# Patient Record
Sex: MALE | Race: WHITE | Employment: OTHER | URBAN - METROPOLITAN AREA
[De-identification: names, ages, dates, MRNs, and addresses within clinical notes are randomized per-mention and may not be internally consistent; named-entity substitution may affect disease eponyms.]

---

## 2017-01-03 ENCOUNTER — TELEPHONE (OUTPATIENT)
Dept: CARDIOLOGY CLINIC | Age: 67
End: 2017-01-03

## 2017-01-03 NOTE — TELEPHONE ENCOUNTER
Patient called to see if you could put him on something other then Xarelto 20 mg once daily a 90 day supply cost him $400.00 and can not afford it any more,  The pharmacy suggested Plavix is this an option for him. Please call and advise him. Thank you.

## 2017-01-03 NOTE — TELEPHONE ENCOUNTER
Message        ----- Message from Celeste Boggs NP sent at 1/3/2017  1:12 PM EST -----       Have the patient check with the pharmacy is       1. Eliquis 5mg BID        2. Pradaxa 150mg BID       Is more affordable, if not then he needs to switch to Coumadin. Plavix is not a alternative.        ----- Message from Chele Arriaza LPN sent at 0/6/3047  1:06 PM EST -----       Please advise as to what he can switch to. Thank you. Called pt,verified with two pt identifiers, relayed message to pt. He asked that I call back and leave message on his voice mail letting him know the options. I called back and left the options on his phone and advised him to call back once he has made a decision.

## 2017-01-06 ENCOUNTER — TELEPHONE (OUTPATIENT)
Dept: CARDIOLOGY CLINIC | Age: 67
End: 2017-01-06

## 2017-01-17 ENCOUNTER — ANESTHESIA (OUTPATIENT)
Dept: ENDOSCOPY | Age: 67
End: 2017-01-17
Payer: MEDICARE

## 2017-01-17 ENCOUNTER — ANESTHESIA EVENT (OUTPATIENT)
Dept: ENDOSCOPY | Age: 67
End: 2017-01-17
Payer: MEDICARE

## 2017-01-17 PROCEDURE — 74011250636 HC RX REV CODE- 250/636

## 2017-01-17 PROCEDURE — 74011000250 HC RX REV CODE- 250

## 2017-01-17 RX ORDER — GLYCOPYRROLATE 0.2 MG/ML
INJECTION INTRAMUSCULAR; INTRAVENOUS AS NEEDED
Status: DISCONTINUED | OUTPATIENT
Start: 2017-01-17 | End: 2017-01-17 | Stop reason: HOSPADM

## 2017-01-17 RX ORDER — PROPOFOL 10 MG/ML
INJECTION, EMULSION INTRAVENOUS AS NEEDED
Status: DISCONTINUED | OUTPATIENT
Start: 2017-01-17 | End: 2017-01-17 | Stop reason: HOSPADM

## 2017-01-17 RX ADMIN — GLYCOPYRROLATE 0.2 MG: 0.2 INJECTION INTRAMUSCULAR; INTRAVENOUS at 09:21

## 2017-01-17 RX ADMIN — PROPOFOL 180 MG: 10 INJECTION, EMULSION INTRAVENOUS at 09:26

## 2017-01-17 NOTE — ANESTHESIA POSTPROCEDURE EVALUATION
Post-Anesthesia Evaluation and Assessment    Patient: Mechele Gitelman. MRN: [id number]  SSN: [id number]    YOB: 1950  Age: 79 y.o. Sex: male       Cardiovascular Function/Vital Signs  Visit Vitals    /85    Pulse (!) 58    Temp 36.4 °C (97.6 °F)    Resp 14    Ht 5' 8\" (1.727 m)    Wt 89.6 kg (197 lb 8 oz)    SpO2 94%    BMI 30.03 kg/m2       Patient is status post total IV anesthesia anesthesia for Procedure(s):  COLONOSCOPY. Nausea/Vomiting: None    Postoperative hydration reviewed and adequate. Pain:  Pain Scale 1: Numeric (0 - 10) (01/17/17 0957)  Pain Intensity 1: 0 (01/17/17 0957)   Managed    Neurological Status: At baseline    Mental Status and Level of Consciousness: Arousable    Pulmonary Status:   O2 Device: Room air (01/17/17 0957)   Adequate oxygenation and airway patent    Complications related to anesthesia: None    Post-anesthesia assessment completed.  No concerns    Signed By: Herby Favre, DO     January 17, 2017

## 2017-01-17 NOTE — ANESTHESIA PREPROCEDURE EVALUATION
Anesthetic History   No history of anesthetic complications            Review of Systems / Medical History  Patient summary reviewed, nursing notes reviewed and pertinent labs reviewed    Pulmonary  Within defined limits                 Neuro/Psych   Within defined limits           Cardiovascular    Hypertension        Dysrhythmias : atrial fibrillation  Past MI, CAD and hyperlipidemia    Exercise tolerance: >4 METS  Comments: Hypertrophic CM    SSS    Ejection fraction was  estimated in the range of 55 % to 60 %   GI/Hepatic/Renal  Within defined limits              Endo/Other  Within defined limits           Other Findings   Comments: Hx of throat CA          Physical Exam    Airway  Mallampati: II  TM Distance: 4 - 6 cm  Neck ROM: normal range of motion   Mouth opening: Normal     Cardiovascular  Regular rate and rhythm,  S1 and S2 normal,  no murmur, click, rub, or gallop             Dental    Dentition: Caps/crowns, Bridges and Implants     Pulmonary  Breath sounds clear to auscultation               Abdominal  GI exam deferred       Other Findings            Anesthetic Plan    ASA: 2  Anesthesia type: total IV anesthesia and MAC          Induction: Intravenous  Anesthetic plan and risks discussed with: Patient

## 2017-03-09 DIAGNOSIS — I10 ESSENTIAL HYPERTENSION: ICD-10-CM

## 2017-03-09 RX ORDER — POTASSIUM CHLORIDE 1500 MG/1
TABLET, EXTENDED RELEASE ORAL
Qty: 180 TAB | Refills: 0 | Status: SHIPPED | OUTPATIENT
Start: 2017-03-09 | End: 2017-05-01 | Stop reason: SDUPTHER

## 2017-05-01 DIAGNOSIS — I10 ESSENTIAL HYPERTENSION: ICD-10-CM

## 2017-05-01 RX ORDER — POTASSIUM CHLORIDE 20 MEQ/1
TABLET, EXTENDED RELEASE ORAL
Qty: 180 TAB | Refills: 0 | Status: SHIPPED | OUTPATIENT
Start: 2017-05-01 | End: 2017-09-19 | Stop reason: SDUPTHER

## 2017-05-12 ENCOUNTER — OFFICE VISIT (OUTPATIENT)
Dept: CARDIOLOGY CLINIC | Age: 67
End: 2017-05-12

## 2017-05-12 VITALS
BODY MASS INDEX: 31.11 KG/M2 | DIASTOLIC BLOOD PRESSURE: 82 MMHG | WEIGHT: 205.3 LBS | RESPIRATION RATE: 18 BRPM | HEIGHT: 68 IN | SYSTOLIC BLOOD PRESSURE: 148 MMHG | HEART RATE: 63 BPM | OXYGEN SATURATION: 97 %

## 2017-05-12 DIAGNOSIS — E78.2 MIXED HYPERLIPIDEMIA: ICD-10-CM

## 2017-05-12 DIAGNOSIS — I10 ESSENTIAL HYPERTENSION: ICD-10-CM

## 2017-05-12 DIAGNOSIS — I48.0 PAROXYSMAL ATRIAL FIBRILLATION (HCC): Primary | ICD-10-CM

## 2017-05-12 RX ORDER — POTASSIUM CHLORIDE 1500 MG/1
TABLET, FILM COATED, EXTENDED RELEASE ORAL
COMMUNITY
Start: 2017-05-01 | End: 2017-05-12 | Stop reason: SDUPTHER

## 2017-05-12 NOTE — MR AVS SNAPSHOT
Visit Information Date & Time Provider Department Dept. Phone Encounter #  
 5/12/2017  9:15 AM [address], [place] [facility] [phone] [id number] Upcoming Health Maintenance Date Due Hepatitis C Screening 1950 DTaP/Tdap/Td series (1 - Tdap) 1/4/1971 FOBT Q 1 YEAR AGE 50-75 1/4/2000 ZOSTER VACCINE AGE 60> 1/4/2010 GLAUCOMA SCREENING Q2Y 1/4/2015 Pneumococcal 65+ Low/Medium Risk (1 of 2 - PCV13) 1/4/2015 MEDICARE YEARLY EXAM 1/4/2015 INFLUENZA AGE 9 TO ADULT 8/1/2017 Allergies as of 5/12/2017  Review Complete On: 5/12/2017 By: Duane Males No Known Allergies Current Immunizations  Never Reviewed No immunizations on file. Not reviewed this visit You Were Diagnosed With   
  
 Codes Comments Paroxysmal atrial fibrillation (HCC)    -  Primary ICD-10-CM: I48.0 ICD-9-CM: 427.31 Mixed hyperlipidemia     ICD-10-CM: E78.2 ICD-9-CM: 272.2 Essential hypertension     ICD-10-CM: I10 
ICD-9-CM: 401.9 Vitals BP Pulse Resp Height(growth percentile) Weight(growth percentile) SpO2  
 148/82 (BP 1 Location: Left arm, BP Patient Position: Sitting) 63 18 5' 8\" (1.727 m) 205 lb 4.8 oz (93.1 kg) 97% BMI Smoking Status 31.22 kg/m2 Never Smoker Vitals History BMI and BSA Data Body Mass Index Body Surface Area  
 31.22 kg/m 2 2.11 m 2 Preferred Pharmacy Pharmacy Name Phone [id number] [phone] Your Updated Medication List  
  
   
This list is accurate as of: 5/12/17 10:05 AM.  Always use your most recent med list. amLODIPine 5 mg tablet Commonly known as:  Sherald Burkitt Take one tablet by mouth one time daily  
  
 lovastatin 40 mg tablet Commonly known as:  MEVACOR  
TAKE ONE TABLET BY MOUTH ONE TIME DAILY potassium chloride 20 mEq tablet Commonly known as:  K-DUR, KLOR-CON  
TAKE ONE TABLET BY MOUTH THREE TIMES DAILY  
  
 triamterene-hydroCHLOROthiazide 37.5-25 mg per tablet Commonly known as:  Kathy Iman Take  by mouth daily. VITAMIN D3 2,000 unit Tab Generic drug:  cholecalciferol (vitamin D3) Take 2,000 Units by mouth daily. Indications: PREVENTION OF VITAMIN D DEFICIENCY  
  
 XARELTO 20 mg Tab tablet Generic drug:  rivaroxaban Take 1 tablet by mouth once daily to prevent blood clots due to chronic atrial fibrillation We Performed the Following AMB POC EKG ROUTINE W/ 12 LEADS, INTER & REP [00162 CPT(R)] Introducing hospitals & [facility] SERVICES! Dear Tin [place]: Thank you for requesting a [employer] account. Our records indicate that you already have an active [employer] account. You can access your account anytime at [email or web address] Did you know that you can access your hospital and ER discharge instructions at any time in [employer])? You can also review all of your test results from your hospital stay or ER visit. Additional Information If you have questions, please visit the Frequently Asked Questions section of the [employer] website at [email or web address] Remember, [employer] is NOT to be used for urgent needs. For medical emergencies, dial 911. Now available from your iPhone and Android! Please provide this summary of care documentation to your next provider. Your primary care clinician is listed as Malka Miranda. If you have any questions after today's visit, please call [phone].

## 2017-05-12 NOTE — PROGRESS NOTES
Chief Complaint   Patient presents with    Irregular Heart Beat     6 mo f/u    Cholesterol Problem     \"    Hypertension     \"    Medication Evaluation     for xarelto

## 2017-05-17 NOTE — PROGRESS NOTES
NAME:  Susana Macias. :   1950   MRN:   [id number]   PCP:  Estelle Sharma MD           Subjective: The patient is a 79y.o. year old male  who returns for a routine follow-up. Since the last visit, patient reports no change in exercise tolerance, chest pain, edema, medication intolerance, palpitations, shortness of breath, PND/orthopnea wheezing, sputum, syncope, dizziness or light headedness. Doing well. Past Medical History:   Diagnosis Date    Arrhythmia     a fib    Cancer ([facility] [address]     skin, throat cancer    G tube feedings ([facility] [address]     PEG    High cholesterol     Hypertension     [facility]     per cardiology notes 2013    Status post chemoradiation     completed 2013    Throat cancer [facility])         ICD-10-CM ICD-9-CM    1. Paroxysmal atrial fibrillation (HCC) I48.0 427.31    2. Mixed hyperlipidemia E78.2 272.2 AMB POC EKG ROUTINE W/ 12 LEADS, INTER & REP   3. Essential hypertension I10 401.9       Social History   Substance Use Topics    Smoking status: Never Smoker    Smokeless tobacco: Former User     Quit date: 3/30/2013    Alcohol use No      Family History   Problem Relation Age of Onset    Heart Attack Mother     Cancer Father         Review of Systems  General: Pt denies excessive weight gain or loss. Pt is able to conduct ADL's  HEENT: Denies blurred vision, headaches, epistaxis and difficulty swallowing. Respiratory: Denies shortness of breath, DUMONT, wheezing or stridor. Cardiovascular: Denies precordial pain, palpitations, edema or PND  Gastrointestinal: Denies poor appetite, indigestion, abdominal pain or blood in stool  Musculoskeletal: Denies pain or swelling from muscles or joints  Neurologic: Denies tremor, paresthesias, or sensory motor disturbance  Skin: Denies rash, itching or texture change.     Objective:       Vitals:    17 0925   BP: 148/82   Pulse: 63   Resp: 18   SpO2: 97%   Weight: 205 lb 4.8 oz (93.1 kg)   Height: 5' 8\" (1.727 m)    Body mass index is 31.22 kg/(m^2). General PE  Mental Status - Alert. General Appearance - Not in acute distress. Chest and Lung Exam   Inspection: Accessory muscles - No use of accessory muscles in breathing. Auscultation:   Breath sounds: - Normal.    Cardiovascular   Inspection: Jugular vein - Bilateral - Inspection Normal.  Palpation/Percussion:   Apical Impulse: - Normal.  Auscultation: Rhythm - Regular. Heart Sounds - S1 WNL and S2 WNL. No S3 or S4. Murmurs & Other Heart Sounds: Auscultation of the heart reveals - No Murmurs. Peripheral Vascular   Upper Extremity: Inspection - Bilateral - No Cyanotic nailbeds or Digital clubbing. Lower Extremity:   Palpation: Edema - Bilateral - No edema. Data Review:     EKG -EKG: normal EKG, normal sinus rhythm, unchanged from previous tracings. Medications reviewed  Current Outpatient Prescriptions   Medication Sig    XARELTO 20 mg tab tablet Take 1 tablet by mouth once daily to prevent blood clots due to chronic atrial fibrillation    potassium chloride (K-DUR, KLOR-CON) 20 mEq tablet TAKE ONE TABLET BY MOUTH THREE TIMES DAILY     cholecalciferol, vitamin D3, (VITAMIN D3) 2,000 unit tab Take 2,000 Units by mouth daily. Indications: PREVENTION OF VITAMIN D DEFICIENCY    lovastatin (MEVACOR) 40 mg tablet TAKE ONE TABLET BY MOUTH ONE TIME DAILY     amLODIPine (NORVASC) 5 mg tablet Take one tablet by mouth one time daily    triamterene-hydrochlorothiazide (MAXZIDE) 37.5-25 mg per tablet Take  by mouth daily. No current facility-administered medications for this visit. Assessment:       ICD-10-CM ICD-9-CM    1. Paroxysmal atrial fibrillation (HCC) I48.0 427.31    2. Mixed hyperlipidemia E78.2 272.2 AMB POC EKG ROUTINE W/ 12 LEADS, INTER & REP   3. Essential hypertension I10 401.9         Plan:     Patient presents doing well and stable from cardiac standpoint.  Continue current care and follow up in 6 months.     Anita Couch MD

## 2017-07-03 ENCOUNTER — TELEPHONE (OUTPATIENT)
Dept: CARDIOLOGY CLINIC | Age: 67
End: 2017-07-03

## 2017-07-03 NOTE — TELEPHONE ENCOUNTER
Pt need rx refill 90 supply of xarelto 15 mg to be sent to [facility]. file.      Please call  thanks

## 2017-07-06 NOTE — TELEPHONE ENCOUNTER
Pt upset, says his refill for xarelto should be 15mg due to Dr Stephanie Houser reduce dosage due to bruising. RX sent on 7-3 for Xarelto 20mg sent to [facility] is in error. Pt wants rx for 15mg xarelto sent to [facility]. Please call pt to confirm.      Thanks

## 2017-07-06 NOTE — TELEPHONE ENCOUNTER
Verified patient with two identifiers. Spoke with pt, stated Dr. Sayra Guevara was to change Xarelto to 15 mg from last visit. Let pt know that we will confirm with Dr. Alistair Fregoso and let him know.

## 2017-07-10 NOTE — TELEPHONE ENCOUNTER
Pt called waiting on  update on xarelto dosage, please call him once this has been updated.       Thanks

## 2017-07-10 NOTE — TELEPHONE ENCOUNTER
Verified patient with two identifiers. Spoke with pt letting him know that Xarelto has been changed to 15 mg q day. Pt asked for 90 day supply to be sent to pharmacy in [place]. Verified patient with two identifiers.       MD Jonathon Fuentes LPN        Caller: Unspecified (1 week ago,  2:05 PM)                     Change to xarelto 15, thx

## 2017-07-10 NOTE — TELEPHONE ENCOUNTER
Michael Babin, NP   You 36 minutes ago (12:24 PM)                 Please discuss with Dr Khari Srinivasan is no documentation that Dr Catracho Shaikh was going to reduce the dose to 15mg.  The therapeutic dose for this patient is 20mg

## 2017-08-17 RX ORDER — LOVASTATIN 40 MG/1
TABLET ORAL
Qty: 90 TAB | Refills: 0 | Status: SHIPPED | OUTPATIENT
Start: 2017-08-17 | End: 2017-11-07 | Stop reason: SDUPTHER

## 2017-08-20 RX ORDER — AMLODIPINE BESYLATE 5 MG/1
TABLET ORAL
Qty: 90 TAB | Refills: 1 | Status: SHIPPED | OUTPATIENT
Start: 2017-08-20 | End: 2018-02-06 | Stop reason: SDUPTHER

## 2017-09-19 DIAGNOSIS — I10 ESSENTIAL HYPERTENSION: ICD-10-CM

## 2017-09-19 RX ORDER — POTASSIUM CHLORIDE 1500 MG/1
TABLET, FILM COATED, EXTENDED RELEASE ORAL
Qty: 180 TAB | Refills: 0 | Status: SHIPPED | OUTPATIENT
Start: 2017-09-19 | End: 2017-12-05 | Stop reason: SDUPTHER

## 2017-10-03 ENCOUNTER — TELEPHONE (OUTPATIENT)
Dept: CARDIOLOGY CLINIC | Age: 67
End: 2017-10-03

## 2017-10-03 NOTE — TELEPHONE ENCOUNTER
Verified patient with two identifiers. Spoke with pt advising him to come in tomorrow. Marvie Devon call pt and shave him come in tomorrow. , either with Evangelina Perea or Dr. Rona Caal whatever time works for him. Thanks!       Brian Valles, NP   You 2 minutes ago (4:46 PM)                 Bring him in tomorrow to see either me or Rona Caal

## 2017-10-03 NOTE — TELEPHONE ENCOUNTER
Pt called stating he is have episode of a-fib all day. No chest pain, no sob, or swelling. /79 . Please advise.

## 2017-10-04 ENCOUNTER — HOSPITAL ENCOUNTER (OUTPATIENT)
Dept: LAB | Age: 67
Discharge: HOME | End: 2017-10-04
Payer: MEDICARE

## 2017-10-04 ENCOUNTER — OFFICE VISIT (OUTPATIENT)
Dept: CARDIOLOGY CLINIC | Age: 67
End: 2017-10-04

## 2017-10-04 VITALS
HEIGHT: 68 IN | BODY MASS INDEX: 31.08 KG/M2 | OXYGEN SATURATION: 97 % | RESPIRATION RATE: 20 BRPM | HEART RATE: 81 BPM | WEIGHT: 205.1 LBS | SYSTOLIC BLOOD PRESSURE: 110 MMHG | DIASTOLIC BLOOD PRESSURE: 68 MMHG

## 2017-10-04 DIAGNOSIS — I48.0 PAROXYSMAL ATRIAL FIBRILLATION (HCC): Primary | ICD-10-CM

## 2017-10-04 DIAGNOSIS — I10 ESSENTIAL HYPERTENSION: ICD-10-CM

## 2017-10-04 DIAGNOSIS — I49.5 SSS (SICK SINUS SYNDROME) (HCC): ICD-10-CM

## 2017-10-04 DIAGNOSIS — E78.2 MIXED HYPERLIPIDEMIA: ICD-10-CM

## 2017-10-04 DIAGNOSIS — I42.2 HYPERTROPHIC CARDIOMYOPATHY (HCC): ICD-10-CM

## 2017-10-04 PROCEDURE — 36415 COLL VENOUS BLD VENIPUNCTURE: CPT

## 2017-10-04 PROCEDURE — 83735 ASSAY OF MAGNESIUM: CPT

## 2017-10-04 PROCEDURE — 80053 COMPREHEN METABOLIC PANEL: CPT

## 2017-10-04 NOTE — PROGRESS NOTES
NAME:  Lonia Curling   :   1950   MRN:   [id number]   PCP:  Edouard Santoro MD           Subjective: The patient is a 79y.o. year old male  who returns for a problem based visit. Since the last visit, patient reports no change in exercise tolerance, chest pain, edema, medication intolerance, palpitations, shortness of breath, PND/orthopnea wheezing, sputum, syncope, dizziness or light headedness. Recently returned from [place]. He began to feel something different in his chest 2 days ago. His home bp monitor shows normal BPs but his HR is variable and higher than normal (80-100s). In addition, he has felt more fatigued for the last 2 days. Notes that he took all medications with him in [place]. Past Medical History:   Diagnosis Date    Arrhythmia     a fib    Cancer ([address]     skin, throat cancer    G tube feedings ([facility] [address]     PEG    High cholesterol     Hypertension     Hypertrophic cardiomyopathy ([facility] [address]     per cardiology notes 2013    Status post chemoradiation     completed 2013    Throat cancer [facility])        Social History   Substance Use Topics    Smoking status: Never Smoker    Smokeless tobacco: Former User     Quit date: 3/30/2013    Alcohol use No      Family History   Problem Relation Age of Onset    Heart Attack Mother     Cancer Father         Review of Systems  Constitutional: Negative for fever, chills, and diaphoresis. Respiratory: Negative for cough, hemoptysis, sputum production, shortness of breath and wheezing. Cardiovascular: Negative for chest pain, palpitations, orthopnea, claudication, leg swelling and PND. Gastrointestinal: Negative for heartburn, nausea, vomiting, blood in stool and melena. Genitourinary: Negative for dysuria and flank pain. Musculoskeletal: Negative for joint pain and back pain. Skin: Negative for rash. Neurological: Negative for focal weakness, seizures, loss of consciousness, weakness and headaches. Endo/Heme/Allergies: Does not bruise/bleed easily. Psychiatric/Behavioral: Negative for memory loss. The patient does not have insomnia. Objective:       Vitals:    10/04/17 0939 10/04/17 0945   BP: 110/70 110/68   Pulse: 81    Resp: 20    SpO2: 97%    Weight: 205 lb 1.6 oz (93 kg)    Height: 5' 8\" (1.727 m)     Body mass index is 31.19 kg/(m^2). General PE    Gen: NAD     Mental Status - Alert. General Appearance - Not in acute distress. Neck - no JVD     Chest and Lung Exam     Inspection: Accessory muscles - No use of accessory muscles in breathing. Auscultation:   Breath sounds: - Normal.     Cardiovascular   Inspection: Jugular vein - Bilateral - Inspection Normal.   Palpation/Percussion:   Apical Impulse: - Normal.   Auscultation: Rhythm - irregularly irregular. Heart Sounds - S1 WNL and S2 WNL. No S3 or S4. Murmurs & Other Heart Sounds: Auscultation of the heart reveals - No Murmurs. Peripheral Vascular   Upper Extremity: Inspection - Bilateral - No Cyanotic nailbeds or Digital clubbing. Lower Extremity:   Palpation: Edema - Bilateral - No edema. Abdomen: Soft, non-tender, bowel sounds are active. Neuro: A&O times 3, CN and motor grossly WNL      Data Review:     EKG - atrial fibrillation. Allergies reviewed  No Known Allergies    Medications reviewed  Current Outpatient Prescriptions   Medication Sig    potassium chloride SR (K-TAB) 20 mEq tablet TAKE ONE TABLET BY MOUTH THREE TIMES DAILY     amLODIPine (NORVASC) 5 mg tablet TAKE ONE TABLET BY MOUTH ONE TIME DAILY     lovastatin (MEVACOR) 40 mg tablet TAKE ONE TABLET BY MOUTH ONE TIME DAILY     rivaroxaban (XARELTO) 15 mg tab tablet Take 1 Tab by mouth daily (with dinner).  cholecalciferol, vitamin D3, (VITAMIN D3) 2,000 unit tab Take 2,000 Units by mouth daily. Indications: PREVENTION OF VITAMIN D DEFICIENCY    triamterene-hydrochlorothiazide (MAXZIDE) 37.5-25 mg per tablet Take  by mouth daily.      No current facility-administered medications for this visit. Assessment:       ICD-10-CM ICD-9-CM    1. Paroxysmal atrial fibrillation (HCC) I48.0 427.31 AMB POC EKG ROUTINE W/ 12 LEADS, INTER & REP      MAGNESIUM      METABOLIC PANEL, COMPREHENSIVE      2D ECHO COMPLETE ADULT (TTE) W OR WO CONTR      CARDIOVERSION, ELECTIVE   2. Essential hypertension I10 401.9 MAGNESIUM      METABOLIC PANEL, COMPREHENSIVE      2D ECHO COMPLETE ADULT (TTE) W OR WO CONTR      CARDIOVERSION, ELECTIVE   3. Mixed hyperlipidemia E78.2 272.2 MAGNESIUM      METABOLIC PANEL, COMPREHENSIVE      2D ECHO COMPLETE ADULT (TTE) W OR WO CONTR      CARDIOVERSION, ELECTIVE   4. Hypertrophic cardiomyopathy (HCC) I42.2 425.18 MAGNESIUM      METABOLIC PANEL, COMPREHENSIVE      2D ECHO COMPLETE ADULT (TTE) W OR WO CONTR      CARDIOVERSION, ELECTIVE   5. SSS (sick sinus syndrome) (HCC) I49.5 427.81 MAGNESIUM      METABOLIC PANEL, COMPREHENSIVE      2D ECHO COMPLETE ADULT (TTE) W OR WO CONTR      CARDIOVERSION, ELECTIVE        Orders Placed This Encounter    MAGNESIUM    METABOLIC PANEL, COMPREHENSIVE    CARDIOVERSION, ELECTIVE     Standing Status:   Future     Standing Expiration Date:   4/4/2018     Order Specific Question:   Reason for Exam:     Answer:   PAF [id number]    AMB POC EKG ROUTINE W/ 12 LEADS, INTER & REP     Order Specific Question:   Reason for Exam:     Answer:   routine    2D ECHO COMPLETE ADULT (TTE) W OR WO CONTR     Standing Status:   Future     Standing Expiration Date:   4/4/2018     Order Specific Question:   Reason for Exam:     Answer:   hypertrophic cardiomyopathy, PAF - ASSESS LA SIZE     Order Specific Question:   Contrast Enhancement (Bubble Study, Definity, Optison) may be used if criteria listed in established evidence-based protocol has been identified.      Answer:   Yes       Patient Active Problem List   Diagnosis Code    Atrial fibrillation (HCC) I48.91    HTN (hypertension) I10    Mixed hyperlipidemia E78.2  Hypertrophic cardiomyopathy (HCC) I42.2    SSS (sick sinus syndrome) ([facility] [address] I49.5    Sepsis ([facility] [address] A41.9       Plan:     Patient presents for follow up. He is in new onset atrial fibrillation for the last 2 days. No beta blockers due to resting HR 40s when in sinus rhythm. Echo to assess LA size as he may be a good ablation candidate. Schedule for cardioversion. Jearline Romberg, [facility]    10/4/2017         Agree with note as outlined by  NP. I confirm findings in history and physical exam. No additional findings noted. Agree with plan as outlined above. Discussed PVI. Will proceed with [facility] for symptomatic afib. His resting bradycardia precludes use of anti arrhythmic drugs. Will refer to EP for PVI.     Tamika Wiley MD

## 2017-10-05 ENCOUNTER — HOSPITAL ENCOUNTER (OUTPATIENT)
Dept: NON INVASIVE DIAGNOSTICS | Age: 67
Discharge: HOME | End: 2017-10-05
Payer: MEDICARE

## 2017-10-05 VITALS
WEIGHT: 200 LBS | HEART RATE: 62 BPM | OXYGEN SATURATION: 96 % | RESPIRATION RATE: 16 BRPM | BODY MASS INDEX: 30.31 KG/M2 | DIASTOLIC BLOOD PRESSURE: 67 MMHG | HEIGHT: 68 IN | SYSTOLIC BLOOD PRESSURE: 109 MMHG

## 2017-10-05 DIAGNOSIS — I48.0 PAROXYSMAL ATRIAL FIBRILLATION (HCC): ICD-10-CM

## 2017-10-05 DIAGNOSIS — I10 ESSENTIAL HYPERTENSION: ICD-10-CM

## 2017-10-05 DIAGNOSIS — I42.2 HYPERTROPHIC CARDIOMYOPATHY (HCC): ICD-10-CM

## 2017-10-05 DIAGNOSIS — I49.5 SSS (SICK SINUS SYNDROME) (HCC): ICD-10-CM

## 2017-10-05 DIAGNOSIS — E78.2 MIXED HYPERLIPIDEMIA: ICD-10-CM

## 2017-10-05 LAB
ALBUMIN SERPL-MCNC: 4.2 G/DL (ref 3.6–4.8)
ALBUMIN/GLOB SERPL: 1.9 {RATIO} (ref 1.2–2.2)
ALP SERPL-CCNC: 51 IU/L (ref 39–117)
ALT SERPL-CCNC: 19 IU/L (ref 0–44)
AST SERPL-CCNC: 22 IU/L (ref 0–40)
ATRIAL RATE: 49 BPM
BILIRUB SERPL-MCNC: 0.4 MG/DL (ref 0–1.2)
BUN SERPL-MCNC: 27 MG/DL (ref 8–27)
BUN/CREAT SERPL: 24 (ref 10–24)
CALCIUM SERPL-MCNC: 9.8 MG/DL (ref 8.6–10.2)
CALCULATED P AXIS, ECG09: 51 DEGREES
CALCULATED R AXIS, ECG10: 26 DEGREES
CALCULATED T AXIS, ECG11: -171 DEGREES
CHLORIDE SERPL-SCNC: 101 MMOL/L (ref 96–106)
CO2 SERPL-SCNC: 28 MMOL/L (ref 18–29)
CREAT SERPL-MCNC: 1.13 MG/DL (ref 0.76–1.27)
DIAGNOSIS, 93000: NORMAL
GLOBULIN SER CALC-MCNC: 2.2 G/DL (ref 1.5–4.5)
GLUCOSE SERPL-MCNC: 98 MG/DL (ref 65–99)
MAGNESIUM SERPL-MCNC: 2.1 MG/DL (ref 1.6–2.3)
P-R INTERVAL, ECG05: 168 MS
POTASSIUM SERPL-SCNC: 3.9 MMOL/L (ref 3.5–5.2)
PROT SERPL-MCNC: 6.4 G/DL (ref 6–8.5)
Q-T INTERVAL, ECG07: 498 MS
QRS DURATION, ECG06: 86 MS
QTC CALCULATION (BEZET), ECG08: 449 MS
SODIUM SERPL-SCNC: 145 MMOL/L (ref 134–144)
VENTRICULAR RATE, ECG03: 49 BPM

## 2017-10-05 PROCEDURE — 93005 ELECTROCARDIOGRAM TRACING: CPT

## 2017-10-05 PROCEDURE — 77030018729 HC ELECTRD DEFIB PAD CARD -B

## 2017-10-05 PROCEDURE — 92960 CARDIOVERSION ELECTRIC EXT: CPT

## 2017-10-05 PROCEDURE — 99152 MOD SED SAME PHYS/QHP 5/>YRS: CPT

## 2017-10-05 PROCEDURE — 74011250636 HC RX REV CODE- 250/636

## 2017-10-05 RX ORDER — MIDAZOLAM HYDROCHLORIDE 1 MG/ML
INJECTION, SOLUTION INTRAMUSCULAR; INTRAVENOUS
Status: COMPLETED
Start: 2017-10-05 | End: 2017-10-05

## 2017-10-05 RX ORDER — FENTANYL CITRATE 50 UG/ML
25-50 INJECTION, SOLUTION INTRAMUSCULAR; INTRAVENOUS
Status: DISCONTINUED | OUTPATIENT
Start: 2017-10-05 | End: 2017-10-05 | Stop reason: ALTCHOICE

## 2017-10-05 RX ORDER — MIDAZOLAM HYDROCHLORIDE 1 MG/ML
.5-2 INJECTION, SOLUTION INTRAMUSCULAR; INTRAVENOUS
Status: DISCONTINUED | OUTPATIENT
Start: 2017-10-05 | End: 2017-10-05 | Stop reason: ALTCHOICE

## 2017-10-05 RX ORDER — FENTANYL CITRATE 50 UG/ML
INJECTION, SOLUTION INTRAMUSCULAR; INTRAVENOUS
Status: COMPLETED
Start: 2017-10-05 | End: 2017-10-05

## 2017-10-05 RX ADMIN — FENTANYL CITRATE 50 MCG: 50 INJECTION, SOLUTION INTRAMUSCULAR; INTRAVENOUS at 10:01

## 2017-10-05 RX ADMIN — MIDAZOLAM HYDROCHLORIDE 2 MG: 1 INJECTION, SOLUTION INTRAMUSCULAR; INTRAVENOUS at 10:01

## 2017-10-05 RX ADMIN — MIDAZOLAM HYDROCHLORIDE 2 MG: 1 INJECTION, SOLUTION INTRAMUSCULAR; INTRAVENOUS at 10:02

## 2017-10-05 NOTE — PROCEDURES
[id number]   CARDIOVERSION       Name:  Go Johnson   MR#:  [id number]   :  1950   Account #:  [de-identified]        Date of Adm:  10/05/2017       INDICATIONS: Recurrent atrial fibrillation. MEDICATIONS USED: Fentanyl 50 mcg, Versed 4 mg. DESCRIPTION OF PROCEDURE: After obtaining informed consent,   the patient was brought to the echocardiography laboratory. The   patient has been appropriately anticoagulated. After obtaining   adequate sedation, synchronized, biphasic electricity using 360 joules x1 was   Utilized for cardioversion. The patient converted to sinus bradycardia. COMPLICATIONS: None. ESTIMATED BLOOD LOSS: None. SPECIMENS REMOVED: None. RECOMMENDATIONS: Consultation with cardiac electrophysiology   for pulmonary vein isolation.         MD FAHAD Drummond / Leonel   D:  10/05/2017   10:09   T:  10/05/2017   13:49   Job #:  [id number]

## 2017-10-05 NOTE — PROGRESS NOTES
Pt sedated with 50 mcg IV Fentanyl and 4 mg IV Versed for Elective Cardioversion. Pt given 1 synch shock at 360 joules.  Rhythm converted to sinus bradycardia in the 40's

## 2017-10-05 NOTE — PROGRESS NOTES
Patient arrived to Non-Invasive Cardiology Lab for Out Patient Procedure. Staff introduced to patient. Patient identifiers verified with Name and Date of Birth. Procedure verified with patient. Consent forms reviewed and signed by patient or authorized representative and verified. Allergies verified. Patient informed of procedure and plan of care. Questions answered with review. Patient on cardiac monitor, non-invasive blood pressure, SPO2 monitor. On RA. Patient is A&Ox3. Patient reports no complaints. Patient on stretcher, in low position, with side rails up. Patient instructed to call for assistance as needed. Family in waiting room.  ASA 2  Mallampati  2  Per MD

## 2017-10-05 NOTE — DISCHARGE INSTRUCTIONS
DISCHARGE SUMMARY from Gini Weaver RN        The following items were returned to you:  armint        PATIENT INSTRUCTIONS: Continue taking all the same medications      The cardioversion procedure can cause redness to the skin where the patches were placed. You may treat this with Aloe or Cortisone cream over the counter lotion. If the skin appears very reddened or blistered contact your physician      Dr. Carlota Maldonado office will call you to schedule an appointment    What to do at Home:  Recommended activity: No driving today      The discharge information has been reviewed with the PATIENT . The PATIENT  verbalized understanding.

## 2017-10-12 ENCOUNTER — OFFICE VISIT (OUTPATIENT)
Dept: CARDIOLOGY CLINIC | Age: 67
End: 2017-10-12

## 2017-10-12 VITALS
DIASTOLIC BLOOD PRESSURE: 72 MMHG | SYSTOLIC BLOOD PRESSURE: 142 MMHG | RESPIRATION RATE: 18 BRPM | HEART RATE: 49 BPM | OXYGEN SATURATION: 98 % | BODY MASS INDEX: 31.77 KG/M2 | WEIGHT: 209.6 LBS | HEIGHT: 68 IN

## 2017-10-12 DIAGNOSIS — I49.5 SSS (SICK SINUS SYNDROME) (HCC): ICD-10-CM

## 2017-10-12 DIAGNOSIS — I48.0 PAROXYSMAL ATRIAL FIBRILLATION (HCC): ICD-10-CM

## 2017-10-12 DIAGNOSIS — I49.9 IRREGULAR HEART BEAT: Primary | ICD-10-CM

## 2017-10-12 NOTE — PROGRESS NOTES
Subjective:      Grace Fraser is a 79 y.o. male is here for EP consult. He had a placido/cardioversion last week after presenting with symptomatic AF. The patient denies chest pain/ shortness of breath, orthopnea, PND, LE edema, palpitations, syncope, presyncope or fatigue.        Patient Active Problem List    Diagnosis Date Noted    Irregular heart beat 10/12/2017    Sepsis ([address] 08/20/2016    SSS (sick sinus syndrome) ([address] 11/30/2015    Hypertrophic cardiomyopathy ([address] 12/03/2014    Atrial fibrillation ([address] 11/12/2014    HTN (hypertension) 11/12/2014    Mixed hyperlipidemia 11/12/2014      Eitan Henriquez MD  Past Medical History:   Diagnosis Date    Arrhythmia     a fib    Cancer ([address]     skin, throat cancer    G tube feedings ([address]     PEG    High cholesterol     Hypertension     Hypertrophic cardiomyopathy ([address]     per cardiology notes 6/2013    Status post chemoradiation     completed 12/2013    Throat cancer [facility])       Past Surgical History:   Procedure Laterality Date    COLONOSCOPY N/A 1/17/2017    COLONOSCOPY performed by Dank Milton MD at [facility] ENDOSCOPY    HX HEART CATHETERIZATION  2007    HX ORTHOPAEDIC      Rt. hand surgery    HX OTHER SURGICAL      exc. skin cancer    HX OTHER SURGICAL      peg tube inserted    HX TONSILLECTOMY      ND EGD BALLOON DILATION ESOPHAGUS <30 MM DIAM  11/8/2013          No Known Allergies   Family History   Problem Relation Age of Onset    Heart Attack Mother     Cancer Father     negative for cardiac disease  Social History     Social History    Marital status:      Spouse name: N/A    Number of children: N/A    Years of education: N/A     Social History Main Topics    Smoking status: Never Smoker    Smokeless tobacco: Former User     Quit date: 3/30/2013    Alcohol use 0.0 oz/week     0 Standard drinks or equivalent per week      Comment: occasional    Drug use: No    Sexual activity: Not Currently     Other Topics Concern    None     Social History Narrative     Current Outpatient Prescriptions   Medication Sig    potassium chloride SR (K-TAB) 20 mEq tablet TAKE ONE TABLET BY MOUTH THREE TIMES DAILY  (Patient taking differently: twice daily)    amLODIPine (NORVASC) 5 mg tablet TAKE ONE TABLET BY MOUTH ONE TIME DAILY     lovastatin (MEVACOR) 40 mg tablet TAKE ONE TABLET BY MOUTH ONE TIME DAILY     rivaroxaban (XARELTO) 15 mg tab tablet Take 1 Tab by mouth daily (with dinner). (Patient taking differently: Take 15 mg by mouth daily (with breakfast). )    cholecalciferol, vitamin D3, (VITAMIN D3) 2,000 unit tab Take 2,000 Units by mouth daily. Indications: PREVENTION OF VITAMIN D DEFICIENCY    triamterene-hydrochlorothiazide (MAXZIDE) 37.5-25 mg per tablet Take  by mouth daily. No current facility-administered medications for this visit. Vitals:    10/12/17 1004   BP: 142/72   Pulse: (!) 49   Resp: 18   SpO2: 98%   Weight: 209 lb 9.6 oz (95.1 kg)   Height: 5' 8\" (1.727 m)       I have reviewed the nurses notes, vitals, problem list, allergy list, medical history, family, social history and medications. Review of Symptoms:    General: Pt denies excessive weight gain or loss. Pt is able to conduct ADL's  HEENT: Denies blurred vision, headaches, epistaxis and difficulty swallowing. Respiratory: Denies shortness of breath, DUMONT, wheezing or stridor. Cardiovascular: Denies precordial pain, palpitations, edema or PND  Gastrointestinal: Denies poor appetite, indigestion, abdominal pain or blood in stool  Urinary: Denies dysuria, pyuria  Musculoskeletal: Denies pain or swelling from muscles or joints  Neurologic: Denies tremor, paresthesias, or sensory motor disturbance  Skin: Denies rash, itching or texture change. Psych: Denies depression      Physical Exam:      General: Well developed, in no acute distress. HEENT: Eyes - PERRL, no jvd  Heart:  Normal S1/S2 negative S3 or S4.  Regular, no murmur, gallop or rub.   Respiratory: Clear bilaterally x 4, no wheezing or rales  Abdomen:   Soft, non-tender, bowel sounds are active.   Extremities:  No edema, normal cap refill, no cyanosis. Musculoskeletal: No clubbing  Neuro: A&Ox3, speech clear, gait stable. Skin: Skin color is normal. No rashes or lesions. Non diaphoretic  Vascular: 2+ pulses symmetric in all extremities    Cardiographics    Ekg: s jonathon    Results for orders placed or performed during the hospital encounter of 10/05/17   EKG, 12 LEAD, INITIAL   Result Value Ref Range    Ventricular Rate 49 BPM    Atrial Rate 49 BPM    P-R Interval 168 ms    QRS Duration 86 ms    Q-T Interval 498 ms    QTC Calculation (Bezet) 449 ms    Calculated P Axis 51 degrees    Calculated R Axis 26 degrees    Calculated T Axis -171 degrees    Diagnosis       Marked sinus bradycardia  Left ventricular hypertrophy with repolarization abnormality  Confirmed by Isabela Luo ([id number]) on 10/5/2017 10:17:39 PM     Results for orders placed or performed in visit on 11/30/15   CARDIAC HOLTER MONITOR, 24 HOURS    Narrative    ECG Monitor/24 hours, Complete    Reason for Holter Monitor   BRADYCARDIA    Heartbeat    Slowest 37  Average 48  Fastest  75          Results:   Underlying Rhythm: Sinus bradycardia      Atrial Arrhythmias: premature atrial contractions; frequent             AV Conduction: normal    Ventricular Arrhythmias: premature ventricular contractions;  occasional     ST Segment Analysis:non-specific changes     Symptom Correlation:  none    Comment:   Sinus bradycardia, no significant pauses.      Johanny Mtz MD                   Lab Results   Component Value Date/Time    WBC 8.1 08/26/2016 12:38 AM    HGB 12.8 08/26/2016 12:38 AM    HCT 37.0 08/26/2016 12:38 AM    PLATELET 107 19/12/9179 12:38 AM    MCV 92.3 08/26/2016 12:38 AM      Lab Results   Component Value Date/Time    Sodium 145 10/04/2017 10:59 AM    Potassium 3.9 10/04/2017 10:59 AM    Chloride 101 10/04/2017 10:59 AM    CO2 28 10/04/2017 10:59 AM    Anion gap 7 08/26/2016 12:38 AM    Glucose 98 10/04/2017 10:59 AM    BUN 27 10/04/2017 10:59 AM    Creatinine 1.13 10/04/2017 10:59 AM    BUN/Creatinine ratio 24 10/04/2017 10:59 AM    GFR est AA 77 10/04/2017 10:59 AM    GFR est non-AA 67 10/04/2017 10:59 AM    Calcium 9.8 10/04/2017 10:59 AM    Bilirubin, total 0.4 10/04/2017 10:59 AM    AST (SGOT) 22 10/04/2017 10:59 AM    Alk. phosphatase 51 10/04/2017 10:59 AM    Protein, total 6.4 10/04/2017 10:59 AM    Albumin 4.2 10/04/2017 10:59 AM    Globulin 3.5 08/21/2016 04:07 AM    A-G Ratio 1.9 10/04/2017 10:59 AM    ALT (SGPT) 19 10/04/2017 10:59 AM         Assessment:     Assessment:        ICD-10-CM ICD-9-CM    1. Irregular heart beat I49.9 427.9 AMB POC EKG ROUTINE W/ 12 LEADS, INTER & REP      INES DOPPLER      ABLATE      CBC WITH AUTOMATED DIFF      PROTHROMBIN TIME + INR      METABOLIC PANEL, COMPREHENSIVE      XR CHEST PA LAT      MRI CARDIAC MORPH FUNC W WO CONT   2. SSS (sick sinus syndrome) (HCC) I49.5 427.81 AMB POC EKG ROUTINE W/ 12 LEADS, INTER & REP      INES DOPPLER      ABLATE      CBC WITH AUTOMATED DIFF      PROTHROMBIN TIME + INR      METABOLIC PANEL, COMPREHENSIVE      XR CHEST PA LAT      MRI CARDIAC MORPH FUNC W WO CONT   3. Paroxysmal atrial fibrillation (HCC) I48.0 427.31 AMB POC EKG ROUTINE W/ 12 LEADS, INTER & REP      INES DOPPLER      ABLATE      CBC WITH AUTOMATED DIFF      PROTHROMBIN TIME + INR      METABOLIC PANEL, COMPREHENSIVE      XR CHEST PA LAT      MRI CARDIAC MORPH FUN W WO CONT     Orders Placed This Encounter    XR CHEST PA LAT     Standing Status:   Future     Standing Expiration Date:   11/12/2018     Order Specific Question:   Reason for Exam     Answer:   pre op     Order Specific Question:   Is Patient Allergic to Contrast Dye?      Answer:   [facility] [place] FUN W WO CONT     Standing Status:   Future     Standing Expiration Date:   11/12/2018     Order Specific Question:   Is Patient Allergic to Contrast Dye? Answer:   Unknown     Order Specific Question:   Reason for Exam     Answer:   AF     Order Specific Question:   Stat POC Creatinine as needed for Radiology Policy     Answer: Yes    CBC WITH AUTOMATED DIFF    PROTHROMBIN TIME + INR    METABOLIC PANEL, COMPREHENSIVE    ABLATE     afib     Order Specific Question:   Reason for Exam:     Answer:   AF    AMB POC EKG ROUTINE W/ 12 LEADS, INTER & REP     Order Specific Question:   Reason for Exam:     Answer:   routine    INES DOPPLER     Order Specific Question:   Reason for Exam:     Answer:   AF        Plan:   Mr Jasen Lucero is a pleasant gentleman with symptomatic p AF. He is not a candidate for med rx due to his marked bradycardia at baseline. He is a candidate for an afib ablation/ILR. I discussed the risks/benefits/alternatives of the procedure with the patient. Risks include (but are not limited to) bleeding, heart block, infection, cva/mi/tamponade/esophageal perforation/pv stenosis/death. The patient understands that there is a 0-2% major complication rate and agrees to proceed. Thank you for this interesting consultation. Continue medical management for AF. Thank you for allowing me to participate in Juanito Foster 's care.     Shantell Morris MD, Devante Saenz

## 2017-10-12 NOTE — PROGRESS NOTES
Chief Complaint   Patient presents with    Irregular Heart Beat     Ref by Dr. Jannette Garrison for EP study, a fib. Denied current symptoms. Bradycardia.

## 2017-10-20 ENCOUNTER — HOSPITAL ENCOUNTER (OUTPATIENT)
Dept: LAB | Age: 67
Discharge: HOME | End: 2017-10-20
Payer: MEDICARE

## 2017-10-20 PROCEDURE — 80053 COMPREHEN METABOLIC PANEL: CPT

## 2017-10-20 PROCEDURE — 36415 COLL VENOUS BLD VENIPUNCTURE: CPT

## 2017-10-20 PROCEDURE — 85025 COMPLETE CBC W/AUTO DIFF WBC: CPT

## 2017-10-20 PROCEDURE — 85610 PROTHROMBIN TIME: CPT

## 2017-10-21 LAB
ALBUMIN SERPL-MCNC: 4.1 G/DL (ref 3.6–4.8)
ALBUMIN/GLOB SERPL: 1.7 {RATIO} (ref 1.2–2.2)
ALP SERPL-CCNC: 50 IU/L (ref 39–117)
ALT SERPL-CCNC: 21 IU/L (ref 0–44)
AST SERPL-CCNC: 18 IU/L (ref 0–40)
BASOPHILS # BLD AUTO: 0 X10E3/UL (ref 0–0.2)
BASOPHILS NFR BLD AUTO: 0 %
BILIRUB SERPL-MCNC: 0.6 MG/DL (ref 0–1.2)
BUN SERPL-MCNC: 23 MG/DL (ref 8–27)
BUN/CREAT SERPL: 22 (ref 10–24)
CALCIUM SERPL-MCNC: 9.3 MG/DL (ref 8.6–10.2)
CHLORIDE SERPL-SCNC: 101 MMOL/L (ref 96–106)
CO2 SERPL-SCNC: 28 MMOL/L (ref 18–29)
CREAT SERPL-MCNC: 1.06 MG/DL (ref 0.76–1.27)
EOSINOPHIL # BLD AUTO: 0.1 X10E3/UL (ref 0–0.4)
EOSINOPHIL NFR BLD AUTO: 3 %
ERYTHROCYTE [DISTWIDTH] IN BLOOD BY AUTOMATED COUNT: 13.3 % (ref 12.3–15.4)
GFR SERPLBLD CREATININE-BSD FMLA CKD-EPI: 72 ML/MIN/1.73
GFR SERPLBLD CREATININE-BSD FMLA CKD-EPI: 84 ML/MIN/1.73
GLOBULIN SER CALC-MCNC: 2.4 G/DL (ref 1.5–4.5)
GLUCOSE SERPL-MCNC: 109 MG/DL (ref 65–99)
HCT VFR BLD AUTO: 42 % (ref 37.5–51)
HGB BLD-MCNC: 14.6 G/DL (ref 12.6–17.7)
IMM GRANULOCYTES # BLD: 0 X10E3/UL (ref 0–0.1)
IMM GRANULOCYTES NFR BLD: 0 %
INR PPP: 1.1 (ref 0.8–1.2)
LYMPHOCYTES # BLD AUTO: 1.2 X10E3/UL (ref 0.7–3.1)
LYMPHOCYTES NFR BLD AUTO: 25 %
MCH RBC QN AUTO: 32 PG (ref 26.6–33)
MCHC RBC AUTO-ENTMCNC: 34.8 G/DL (ref 31.5–35.7)
MCV RBC AUTO: 92 FL (ref 79–97)
MONOCYTES # BLD AUTO: 0.5 X10E3/UL (ref 0.1–0.9)
MONOCYTES NFR BLD AUTO: 9 %
NEUTROPHILS # BLD AUTO: 3.2 X10E3/UL (ref 1.4–7)
NEUTROPHILS NFR BLD AUTO: 63 %
PLATELET # BLD AUTO: 226 X10E3/UL (ref 150–379)
POTASSIUM SERPL-SCNC: 3.9 MMOL/L (ref 3.5–5.2)
PROT SERPL-MCNC: 6.5 G/DL (ref 6–8.5)
PROTHROMBIN TIME: 11.5 SEC (ref 9.1–12)
RBC # BLD AUTO: 4.56 X10E6/UL (ref 4.14–5.8)
SODIUM SERPL-SCNC: 146 MMOL/L (ref 134–144)
WBC # BLD AUTO: 5 X10E3/UL (ref 3.4–10.8)

## 2017-10-24 ENCOUNTER — HOSPITAL ENCOUNTER (OUTPATIENT)
Dept: MRI IMAGING | Age: 67
Discharge: HOME | End: 2017-10-24
Attending: INTERNAL MEDICINE
Payer: MEDICARE

## 2017-10-24 DIAGNOSIS — I48.0 PAROXYSMAL ATRIAL FIBRILLATION (HCC): ICD-10-CM

## 2017-10-24 DIAGNOSIS — I49.9 IRREGULAR HEART BEAT: ICD-10-CM

## 2017-10-24 DIAGNOSIS — I49.5 SSS (SICK SINUS SYNDROME) (HCC): ICD-10-CM

## 2017-10-24 PROCEDURE — 74011636320 HC RX REV CODE- 636/320: Performed by: INTERNAL MEDICINE

## 2017-10-24 PROCEDURE — A9579 GAD-BASE MR CONTRAST NOS,1ML: HCPCS | Performed by: INTERNAL MEDICINE

## 2017-10-24 PROCEDURE — 75561 CARDIAC MRI FOR MORPH W/DYE: CPT

## 2017-10-24 RX ADMIN — GADOPENTETATE DIMEGLUMINE 36 ML: 469.01 INJECTION INTRAVENOUS at 14:29

## 2017-10-25 ENCOUNTER — ANESTHESIA (OUTPATIENT)
Dept: NON INVASIVE DIAGNOSTICS | Age: 67
End: 2017-10-25
Payer: MEDICARE

## 2017-10-25 ENCOUNTER — ANESTHESIA EVENT (OUTPATIENT)
Dept: NON INVASIVE DIAGNOSTICS | Age: 67
End: 2017-10-25
Payer: MEDICARE

## 2017-10-25 ENCOUNTER — HOSPITAL ENCOUNTER (OUTPATIENT)
Dept: NON INVASIVE DIAGNOSTICS | Age: 67
Setting detail: OBSERVATION
Discharge: HOME | End: 2017-10-26
Attending: INTERNAL MEDICINE | Admitting: INTERNAL MEDICINE
Payer: MEDICARE

## 2017-10-25 PROBLEM — I48.91 A-FIB (HCC): Status: ACTIVE | Noted: 2017-10-25

## 2017-10-25 PROCEDURE — 74011636320 HC RX REV CODE- 636/320

## 2017-10-25 PROCEDURE — 77030008543 HC TBNG MON PRSS MRTM -A

## 2017-10-25 PROCEDURE — 77030011640 HC PAD GRND REM COVD -A

## 2017-10-25 PROCEDURE — 74011000250 HC RX REV CODE- 250

## 2017-10-25 PROCEDURE — 99218 HC RM OBSERVATION: CPT

## 2017-10-25 PROCEDURE — C1893 INTRO/SHEATH, FIXED,NON-PEEL: HCPCS

## 2017-10-25 PROCEDURE — C1730 CATH, EP, 19 OR FEW ELECT: HCPCS

## 2017-10-25 PROCEDURE — 77030020506 HC NDL TRNSPTL NRG BAYL -F

## 2017-10-25 PROCEDURE — C1733 CATH, EP, OTHR THAN COOL-TIP: HCPCS

## 2017-10-25 PROCEDURE — 93613 INTRACARDIAC EPHYS 3D MAPG: CPT

## 2017-10-25 PROCEDURE — 77030030806 HC PTCH ENSIT NAVX STJU -G

## 2017-10-25 PROCEDURE — 77030015398 HC CBL EP EXT STJU -C

## 2017-10-25 PROCEDURE — 77030029163 HC CBL EP DX ACHV DISP MEDT -C

## 2017-10-25 PROCEDURE — 77030016894 HC CBL EP DX CATH3 STJU -B

## 2017-10-25 PROCEDURE — 77030004964 HC CBL EP ABLAT BSC -C

## 2017-10-25 PROCEDURE — 77030029065 HC DRSG HEMO QCLOT ZMED -B

## 2017-10-25 PROCEDURE — 85347 COAGULATION TIME ACTIVATED: CPT

## 2017-10-25 PROCEDURE — C1894 INTRO/SHEATH, NON-LASER: HCPCS

## 2017-10-25 PROCEDURE — 77030030278 HC COAX UMB DISP MEDT -C

## 2017-10-25 PROCEDURE — 74011250636 HC RX REV CODE- 250/636

## 2017-10-25 PROCEDURE — C1769 GUIDE WIRE: HCPCS

## 2017-10-25 PROCEDURE — C1764 EVENT RECORDER, CARDIAC: HCPCS

## 2017-10-25 PROCEDURE — C1759 CATH, INTRA ECHOCARDIOGRAPHY: HCPCS

## 2017-10-25 PROCEDURE — 77030018729 HC ELECTRD DEFIB PAD CARD -B

## 2017-10-25 PROCEDURE — 74011250636 HC RX REV CODE- 250/636: Performed by: INTERNAL MEDICINE

## 2017-10-25 PROCEDURE — 77030030261 HC CBL UMB ELEC DISP MEDT -C

## 2017-10-25 PROCEDURE — 77030034850

## 2017-10-25 PROCEDURE — 74011250637 HC RX REV CODE- 250/637: Performed by: INTERNAL MEDICINE

## 2017-10-25 PROCEDURE — 76210000006 HC OR PH I REC 0.5 TO 1 HR

## 2017-10-25 PROCEDURE — 77030008684 HC TU ET CUF COVD -B: Performed by: NURSE ANESTHETIST, CERTIFIED REGISTERED

## 2017-10-25 PROCEDURE — 76060000035 HC ANESTHESIA 2 TO 2.5 HR

## 2017-10-25 PROCEDURE — 77030029359 HC PRB ESOPH TEMP CATH ANTM -F

## 2017-10-25 PROCEDURE — 77030013797 HC KT TRNSDUC PRSSR EDWD -A

## 2017-10-25 RX ORDER — SODIUM CHLORIDE 9 MG/ML
INJECTION, SOLUTION INTRAVENOUS
Status: DISCONTINUED | OUTPATIENT
Start: 2017-10-25 | End: 2017-10-25 | Stop reason: HOSPADM

## 2017-10-25 RX ORDER — LIDOCAINE HYDROCHLORIDE 20 MG/ML
INJECTION, SOLUTION EPIDURAL; INFILTRATION; INTRACAUDAL; PERINEURAL AS NEEDED
Status: DISCONTINUED | OUTPATIENT
Start: 2017-10-25 | End: 2017-10-25 | Stop reason: HOSPADM

## 2017-10-25 RX ORDER — DOBUTAMINE HYDROCHLORIDE 200 MG/100ML
INJECTION INTRAVENOUS
Status: DISCONTINUED | OUTPATIENT
Start: 2017-10-25 | End: 2017-10-25 | Stop reason: HOSPADM

## 2017-10-25 RX ORDER — SODIUM CHLORIDE 0.9 % (FLUSH) 0.9 %
5-10 SYRINGE (ML) INJECTION AS NEEDED
Status: DISCONTINUED | OUTPATIENT
Start: 2017-10-25 | End: 2017-10-26 | Stop reason: HOSPADM

## 2017-10-25 RX ORDER — GLYCOPYRROLATE 0.2 MG/ML
INJECTION INTRAMUSCULAR; INTRAVENOUS AS NEEDED
Status: DISCONTINUED | OUTPATIENT
Start: 2017-10-25 | End: 2017-10-25 | Stop reason: HOSPADM

## 2017-10-25 RX ORDER — HYDROCODONE BITARTRATE AND ACETAMINOPHEN 5; 325 MG/1; MG/1
1 TABLET ORAL
Status: DISCONTINUED | OUTPATIENT
Start: 2017-10-25 | End: 2017-10-26 | Stop reason: HOSPADM

## 2017-10-25 RX ORDER — AMLODIPINE BESYLATE 5 MG/1
5 TABLET ORAL DAILY
Status: DISCONTINUED | OUTPATIENT
Start: 2017-10-26 | End: 2017-10-26 | Stop reason: HOSPADM

## 2017-10-25 RX ORDER — SODIUM CHLORIDE, SODIUM LACTATE, POTASSIUM CHLORIDE, CALCIUM CHLORIDE 600; 310; 30; 20 MG/100ML; MG/100ML; MG/100ML; MG/100ML
25 INJECTION, SOLUTION INTRAVENOUS CONTINUOUS
Status: CANCELLED | OUTPATIENT
Start: 2017-10-25

## 2017-10-25 RX ORDER — HEPARIN SODIUM 200 [USP'U]/100ML
INJECTION, SOLUTION INTRAVENOUS
Status: COMPLETED
Start: 2017-10-25 | End: 2017-10-25

## 2017-10-25 RX ORDER — SUCCINYLCHOLINE CHLORIDE 20 MG/ML
INJECTION INTRAMUSCULAR; INTRAVENOUS AS NEEDED
Status: DISCONTINUED | OUTPATIENT
Start: 2017-10-25 | End: 2017-10-25 | Stop reason: HOSPADM

## 2017-10-25 RX ORDER — PROPOFOL 10 MG/ML
INJECTION, EMULSION INTRAVENOUS AS NEEDED
Status: DISCONTINUED | OUTPATIENT
Start: 2017-10-25 | End: 2017-10-25 | Stop reason: HOSPADM

## 2017-10-25 RX ORDER — HEPARIN SODIUM 10000 [USP'U]/100ML
INJECTION, SOLUTION INTRAVENOUS
Status: COMPLETED
Start: 2017-10-25 | End: 2017-10-25

## 2017-10-25 RX ORDER — DOBUTAMINE HYDROCHLORIDE 200 MG/100ML
0-10 INJECTION INTRAVENOUS
Status: DISCONTINUED | OUTPATIENT
Start: 2017-10-25 | End: 2017-10-26 | Stop reason: HOSPADM

## 2017-10-25 RX ORDER — MIDAZOLAM HYDROCHLORIDE 1 MG/ML
1-5 INJECTION, SOLUTION INTRAMUSCULAR; INTRAVENOUS
Status: DISCONTINUED | OUTPATIENT
Start: 2017-10-25 | End: 2017-10-26 | Stop reason: HOSPADM

## 2017-10-25 RX ORDER — SODIUM CHLORIDE 0.9 % (FLUSH) 0.9 %
5-10 SYRINGE (ML) INJECTION EVERY 8 HOURS
Status: DISCONTINUED | OUTPATIENT
Start: 2017-10-25 | End: 2017-10-26 | Stop reason: HOSPADM

## 2017-10-25 RX ORDER — FENTANYL CITRATE 50 UG/ML
25 INJECTION, SOLUTION INTRAMUSCULAR; INTRAVENOUS
Status: CANCELLED | OUTPATIENT
Start: 2017-10-25

## 2017-10-25 RX ORDER — TRIAMTERENE/HYDROCHLOROTHIAZID 37.5-25 MG
1 TABLET ORAL DAILY
Status: DISCONTINUED | OUTPATIENT
Start: 2017-10-26 | End: 2017-10-26 | Stop reason: HOSPADM

## 2017-10-25 RX ORDER — PROTAMINE SULFATE 10 MG/ML
25-100 INJECTION, SOLUTION INTRAVENOUS
Status: DISCONTINUED | OUTPATIENT
Start: 2017-10-25 | End: 2017-10-26 | Stop reason: HOSPADM

## 2017-10-25 RX ORDER — PRAVASTATIN SODIUM 40 MG/1
40 TABLET ORAL EVERY EVENING
Status: DISCONTINUED | OUTPATIENT
Start: 2017-10-25 | End: 2017-10-26 | Stop reason: HOSPADM

## 2017-10-25 RX ORDER — ONDANSETRON 2 MG/ML
4 INJECTION INTRAMUSCULAR; INTRAVENOUS AS NEEDED
Status: CANCELLED | OUTPATIENT
Start: 2017-10-25

## 2017-10-25 RX ORDER — HYDROMORPHONE HYDROCHLORIDE 1 MG/ML
.2-.5 INJECTION, SOLUTION INTRAMUSCULAR; INTRAVENOUS; SUBCUTANEOUS
Status: CANCELLED | OUTPATIENT
Start: 2017-10-25

## 2017-10-25 RX ORDER — PROTAMINE SULFATE 10 MG/ML
INJECTION, SOLUTION INTRAVENOUS
Status: DISPENSED
Start: 2017-10-25 | End: 2017-10-25

## 2017-10-25 RX ORDER — LIDOCAINE HYDROCHLORIDE 10 MG/ML
0.1 INJECTION, SOLUTION EPIDURAL; INFILTRATION; INTRACAUDAL; PERINEURAL AS NEEDED
Status: CANCELLED | OUTPATIENT
Start: 2017-10-25

## 2017-10-25 RX ORDER — LIDOCAINE HYDROCHLORIDE AND EPINEPHRINE 10; 10 MG/ML; UG/ML
1-20 INJECTION, SOLUTION INFILTRATION; PERINEURAL
Status: DISCONTINUED | OUTPATIENT
Start: 2017-10-25 | End: 2017-10-26 | Stop reason: HOSPADM

## 2017-10-25 RX ORDER — FENTANYL CITRATE 50 UG/ML
INJECTION, SOLUTION INTRAMUSCULAR; INTRAVENOUS AS NEEDED
Status: DISCONTINUED | OUTPATIENT
Start: 2017-10-25 | End: 2017-10-25 | Stop reason: HOSPADM

## 2017-10-25 RX ORDER — SODIUM CHLORIDE, SODIUM LACTATE, POTASSIUM CHLORIDE, CALCIUM CHLORIDE 600; 310; 30; 20 MG/100ML; MG/100ML; MG/100ML; MG/100ML
25 INJECTION, SOLUTION INTRAVENOUS CONTINUOUS
Status: CANCELLED | OUTPATIENT
Start: 2017-10-25 | End: 2017-10-26

## 2017-10-25 RX ORDER — PROTAMINE SULFATE 10 MG/ML
INJECTION, SOLUTION INTRAVENOUS AS NEEDED
Status: DISCONTINUED | OUTPATIENT
Start: 2017-10-25 | End: 2017-10-25 | Stop reason: HOSPADM

## 2017-10-25 RX ORDER — HEPARIN SODIUM 1000 [USP'U]/ML
INJECTION, SOLUTION INTRAVENOUS; SUBCUTANEOUS
Status: COMPLETED
Start: 2017-10-25 | End: 2017-10-25

## 2017-10-25 RX ORDER — DOBUTAMINE HYDROCHLORIDE 200 MG/100ML
INJECTION INTRAVENOUS
Status: DISCONTINUED
Start: 2017-10-25 | End: 2017-10-26 | Stop reason: HOSPADM

## 2017-10-25 RX ORDER — ONDANSETRON 2 MG/ML
INJECTION INTRAMUSCULAR; INTRAVENOUS AS NEEDED
Status: DISCONTINUED | OUTPATIENT
Start: 2017-10-25 | End: 2017-10-25 | Stop reason: HOSPADM

## 2017-10-25 RX ORDER — LIDOCAINE HYDROCHLORIDE AND EPINEPHRINE 10; 10 MG/ML; UG/ML
INJECTION, SOLUTION INFILTRATION; PERINEURAL
Status: COMPLETED
Start: 2017-10-25 | End: 2017-10-25

## 2017-10-25 RX ORDER — ROCURONIUM BROMIDE 10 MG/ML
INJECTION, SOLUTION INTRAVENOUS AS NEEDED
Status: DISCONTINUED | OUTPATIENT
Start: 2017-10-25 | End: 2017-10-25 | Stop reason: HOSPADM

## 2017-10-25 RX ORDER — HEPARIN SODIUM 200 [USP'U]/100ML
4000 INJECTION, SOLUTION INTRAVENOUS ONCE
Status: COMPLETED | OUTPATIENT
Start: 2017-10-25 | End: 2017-10-25

## 2017-10-25 RX ORDER — ACETAMINOPHEN 325 MG/1
650 TABLET ORAL
Status: DISCONTINUED | OUTPATIENT
Start: 2017-10-25 | End: 2017-10-26 | Stop reason: HOSPADM

## 2017-10-25 RX ORDER — MIDAZOLAM HYDROCHLORIDE 1 MG/ML
1 INJECTION, SOLUTION INTRAMUSCULAR; INTRAVENOUS AS NEEDED
Status: CANCELLED | OUTPATIENT
Start: 2017-10-25

## 2017-10-25 RX ORDER — MIDAZOLAM HYDROCHLORIDE 1 MG/ML
INJECTION, SOLUTION INTRAMUSCULAR; INTRAVENOUS AS NEEDED
Status: DISCONTINUED | OUTPATIENT
Start: 2017-10-25 | End: 2017-10-25 | Stop reason: HOSPADM

## 2017-10-25 RX ORDER — HEPARIN SODIUM 10000 [USP'U]/100ML
12-25 INJECTION, SOLUTION INTRAVENOUS
Status: DISCONTINUED | OUTPATIENT
Start: 2017-10-25 | End: 2017-10-25 | Stop reason: HOSPADM

## 2017-10-25 RX ORDER — ATROPINE SULFATE 0.4 MG/ML
INJECTION, SOLUTION ENDOTRACHEAL; INTRAMEDULLARY; INTRAMUSCULAR; INTRAVENOUS; SUBCUTANEOUS AS NEEDED
Status: DISCONTINUED | OUTPATIENT
Start: 2017-10-25 | End: 2017-10-25 | Stop reason: HOSPADM

## 2017-10-25 RX ORDER — DIPHENHYDRAMINE HYDROCHLORIDE 50 MG/ML
12.5 INJECTION, SOLUTION INTRAMUSCULAR; INTRAVENOUS AS NEEDED
Status: CANCELLED | OUTPATIENT
Start: 2017-10-25 | End: 2017-10-25

## 2017-10-25 RX ORDER — PHENYLEPHRINE HCL IN 0.9% NACL 0.4MG/10ML
SYRINGE (ML) INTRAVENOUS AS NEEDED
Status: DISCONTINUED | OUTPATIENT
Start: 2017-10-25 | End: 2017-10-25 | Stop reason: HOSPADM

## 2017-10-25 RX ORDER — HEPARIN SODIUM 1000 [USP'U]/ML
1000-10000 INJECTION, SOLUTION INTRAVENOUS; SUBCUTANEOUS
Status: DISCONTINUED | OUTPATIENT
Start: 2017-10-25 | End: 2017-10-25 | Stop reason: HOSPADM

## 2017-10-25 RX ORDER — FENTANYL CITRATE 50 UG/ML
50 INJECTION, SOLUTION INTRAMUSCULAR; INTRAVENOUS AS NEEDED
Status: CANCELLED | OUTPATIENT
Start: 2017-10-25

## 2017-10-25 RX ADMIN — ROCURONIUM BROMIDE 5 MG: 10 INJECTION, SOLUTION INTRAVENOUS at 11:19

## 2017-10-25 RX ADMIN — Medication 160 MCG: at 12:49

## 2017-10-25 RX ADMIN — HEPARIN SODIUM 15000 UNITS: 1000 INJECTION, SOLUTION INTRAVENOUS; SUBCUTANEOUS at 11:52

## 2017-10-25 RX ADMIN — RIVAROXABAN 15 MG: 15 TABLET, FILM COATED ORAL at 17:43

## 2017-10-25 RX ADMIN — ROCURONIUM BROMIDE 35 MG: 10 INJECTION, SOLUTION INTRAVENOUS at 11:24

## 2017-10-25 RX ADMIN — HEPARIN SODIUM 5000 UNITS/HR: 10000 INJECTION, SOLUTION INTRAVENOUS at 11:52

## 2017-10-25 RX ADMIN — ATROPINE SULFATE 0.2 MG: 0.4 INJECTION, SOLUTION ENDOTRACHEAL; INTRAMEDULLARY; INTRAMUSCULAR; INTRAVENOUS; SUBCUTANEOUS at 11:29

## 2017-10-25 RX ADMIN — HEPARIN SODIUM 4000 UNITS: 200 INJECTION, SOLUTION INTRAVENOUS at 11:36

## 2017-10-25 RX ADMIN — SUCCINYLCHOLINE CHLORIDE 140 MG: 20 INJECTION INTRAMUSCULAR; INTRAVENOUS at 11:19

## 2017-10-25 RX ADMIN — LIDOCAINE HYDROCHLORIDE AND EPINEPHRINE 20 ML: 10; 10 INJECTION, SOLUTION INFILTRATION; PERINEURAL at 12:54

## 2017-10-25 RX ADMIN — PROPOFOL 20 MG: 10 INJECTION, EMULSION INTRAVENOUS at 12:35

## 2017-10-25 RX ADMIN — MIDAZOLAM HYDROCHLORIDE 2 MG: 1 INJECTION, SOLUTION INTRAMUSCULAR; INTRAVENOUS at 11:11

## 2017-10-25 RX ADMIN — Medication 120 MCG: at 11:44

## 2017-10-25 RX ADMIN — FENTANYL CITRATE 100 MCG: 50 INJECTION, SOLUTION INTRAMUSCULAR; INTRAVENOUS at 11:19

## 2017-10-25 RX ADMIN — DOBUTAMINE HYDROCHLORIDE 20 MCG/KG/MIN: 200 INJECTION INTRAVENOUS at 12:31

## 2017-10-25 RX ADMIN — PROPOFOL 200 MG: 10 INJECTION, EMULSION INTRAVENOUS at 11:19

## 2017-10-25 RX ADMIN — ONDANSETRON 4 MG: 2 INJECTION INTRAMUSCULAR; INTRAVENOUS at 12:24

## 2017-10-25 RX ADMIN — LIDOCAINE HYDROCHLORIDE,EPINEPHRINE BITARTRATE 20 ML: 10; .01 INJECTION, SOLUTION INFILTRATION; PERINEURAL at 12:54

## 2017-10-25 RX ADMIN — ATROPINE SULFATE 0.2 MG: 0.4 INJECTION, SOLUTION ENDOTRACHEAL; INTRAMEDULLARY; INTRAMUSCULAR; INTRAVENOUS; SUBCUTANEOUS at 11:28

## 2017-10-25 RX ADMIN — GLYCOPYRROLATE 0.2 MG: 0.2 INJECTION INTRAMUSCULAR; INTRAVENOUS at 11:27

## 2017-10-25 RX ADMIN — Medication 240 MCG: at 12:56

## 2017-10-25 RX ADMIN — Medication 160 MCG: at 11:31

## 2017-10-25 RX ADMIN — IOPAMIDOL 50 ML: 755 INJECTION, SOLUTION INTRAVENOUS at 12:54

## 2017-10-25 RX ADMIN — GLYCOPYRROLATE 0.2 MG: 0.2 INJECTION INTRAMUSCULAR; INTRAVENOUS at 11:43

## 2017-10-25 RX ADMIN — Medication 10 ML: at 22:54

## 2017-10-25 RX ADMIN — SODIUM CHLORIDE: 9 INJECTION, SOLUTION INTRAVENOUS at 10:45

## 2017-10-25 RX ADMIN — PROPOFOL 30 MG: 10 INJECTION, EMULSION INTRAVENOUS at 12:40

## 2017-10-25 RX ADMIN — PROPOFOL 20 MG: 10 INJECTION, EMULSION INTRAVENOUS at 12:39

## 2017-10-25 RX ADMIN — Medication 120 MCG: at 12:18

## 2017-10-25 RX ADMIN — HEPARIN SODIUM 8000 UNITS: 1000 INJECTION, SOLUTION INTRAVENOUS; SUBCUTANEOUS at 12:05

## 2017-10-25 RX ADMIN — PROTAMINE SULFATE 100 MG: 10 INJECTION, SOLUTION INTRAVENOUS at 12:24

## 2017-10-25 RX ADMIN — LIDOCAINE HYDROCHLORIDE 90 MG: 20 INJECTION, SOLUTION EPIDURAL; INFILTRATION; INTRACAUDAL; PERINEURAL at 11:19

## 2017-10-25 NOTE — ANESTHESIA PREPROCEDURE EVALUATION
Anesthetic History   No history of anesthetic complications            Review of Systems / Medical History  Patient summary reviewed, nursing notes reviewed and pertinent labs reviewed    Pulmonary              Pertinent negatives: No shortness of breath  Comments: Radiation changes   Neuro/Psych   Within defined limits           Cardiovascular    Hypertension        Dysrhythmias (AFib; controlled with med) : atrial fibrillation  Past MI (10 years ago)  Pertinent negatives: No angina  Exercise tolerance: >4 METS  Comments: Hypertrophic Cardiomyopathy; asymptomatic on beta blocker      Ejection fraction was  estimated in the range of 55 % to 60 %.     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 Ca  Has PEG tube Endo/Other        Cancer (throat; just finished chemo & XRT 12/30/13)     Other Findings   Comments: Has dysphagia 2/2 XRT; unable to tolerate po         Physical Exam    Airway  Mallampati: II  TM Distance: < 4 cm  Neck ROM: normal range of motion   Mouth opening: Normal     Cardiovascular  Regular rate and rhythm,  S1 and S2 normal,  no murmur, click, rub, or gallop             Dental    Dentition: Caps/crowns and Implants     Pulmonary  Breath sounds clear to auscultation               Abdominal  GI exam deferred       Other Findings            Anesthetic Plan    ASA: 3  Anesthesia type: general    Monitoring Plan: BIS      Induction: Intravenous  Anesthetic plan and risks discussed with: Patient      Glidescope

## 2017-10-25 NOTE — PROCEDURES
[address] [address]  [phone]    Indications and Pre-Procedure Diagnosis:  Natacha Bob is a 79 y.o. male with AF and atrial flutter is referred for electr-physiologic evaluation and intervention. Post Procedure Diagnosis    AF  Atrial flutter    Atrial Fibrillation Ablation Summary  Informed consent was obtained. The patient was brought to the EP lab where INES demonstrated no thrombus in the left atrial appendage. All vascular access sites were prepped and draped in the usual sterile fashion and the Seldinger technique was used to catherize the RFV and LFV with multi-polar electrode catheters, which were placed in the appropriate intra-cardiac sites under fluoroscopic guidance (see catheter list). Right and left atrial pacing and recording, His bundle recording, and right ventricular pacing and recording were performed. Continuous pulse oximetry and cuff BP monitoring were performed. During the procedure, the patient received Versed, Fentanyl and Propofol for sedation per anesthesia personnel. Transeptal Puncture  A transeptal atrial puncture was performed using fluoroscopic and intracardiac ultrasound guidance. An SL1 sheath was dragged from the SVC along the septum until a sudden leftward displacement was observed. Engagement of the Fossa Ovalis was confirmed by the interatrial tenting seen under intracardiac ultrasound guidance. The Sade Dock NRG needle was advanced into the left atrium and its positioned confirmed with contrast injection. The dilator and sheath were advanced carefully over the needle into the body of the left atrium and the dilator/needle removed. A Flexcath sheath was exchanged over the wire for the SL1 sheath.  No pericardial effusion was appreciated on intracardiac ultrasound so a bolus injection of heparin 100 units/kg was administered, with a continuous heparin gtt of 5000 units/hr so that the activated clotting time maintained was between 300 and 400 seconds with additional boluses q 30 minutes as necessary. A 3D shell representing the left atrium and pulmonary veins was constructed with the electroanatomic mapping system. An Achieve circular mapping catheter was advanced into the left atrium through the Flexcath sheath and a map of the body of the LA and the pulmonary veins was created. Pulmonary vein venography was also performed at that time. A 28 mm Medtronic Cryo balloon was advanced into the left atrium. Cryo ablation of the left atrium was performed at the PV ostia to encircle the right and left PVs. Cryo energy was applied for a total of 16 minutes with confirmed entrance/exit block of four pulmonary veins. Atrial Flutter Ablation  The Flexcath sheath was pulled back to the right atrium and the cryoballoon was removed. A large curve 10 mm tip Blazer catheter was used to deliver RF lesions for a total of 4 minutes in the cavo-tricuspid isthmus with creation of bi-directional isthmus block. Autonomic manipulation with Dobutamine @ 20 mcg/min was performed during this procedure. Post ablation, rapid atrial pacing to 240 msec, on dobutamine, induced left atrial flutter that was cardioverted to sinus rhythm with one 360J biphasic synchronized shock. At the end of the procedure, all catheters were removed, heparin reversed, groin sheaths pulled and hemostasis achieved. The patient left the laboratory in a stable condition. Fluoroscopy and procedure times were 12 and 60 minutes respectively. Estimated blood loss: <10 ml. Sharp counts: correct. Specimen (s) collected: none. The procedure related complication occurred: none. The following problems were encountered: none. Conduction intervals (ms)    A-A A-H H-V P-R QRS Q-T R-R V-V  1476 78 50 176  1476    AV concha conduction    VA Block when pacing at 320 ms      Findings and Summary    This study demonstrates:  1. Successful PVI of all 4 PVs  2.  Atrial flutter ablation with RFA of the CTI and confirmed bi-directional isthmus block    Recommendation:  1. [address]  2. ILR    Thank you for allowing me to participate in this patients care.     Flaca Thomas MD, [facility]

## 2017-10-25 NOTE — ANESTHESIA POSTPROCEDURE EVALUATION
Post-Anesthesia Evaluation and Assessment    Patient: Sukhdeep Welch. MRN: [id number]  SSN: [id number]    YOB: 1950  Age: 79 y.o. Sex: male       Cardiovascular Function/Vital Signs  Visit Vitals    /67 (BP 1 Location: Right arm, BP Patient Position: At rest)    Pulse 65    Temp 37.1 °C (98.7 °F)    Resp 16    Ht 5' 8\" (1.727 m)    Wt 90.7 kg (200 lb)    SpO2 93%    BMI 30.41 kg/m2       Patient is status post general anesthesia for * No procedures listed *. Nausea/Vomiting: None    Postoperative hydration reviewed and adequate. Pain:  Pain Scale 1: Numeric (0 - 10) (10/25/17 1345)  Pain Intensity 1: 0 (10/25/17 1345)   Managed    Neurological Status:   Neuro (WDL): Exceptions to WDL (10/25/17 1325)  Neuro  Neurologic State: Drowsy; Eyes open to voice (10/25/17 1325)  Orientation Level: Oriented X4 (10/25/17 1325)  Cognition: Follows commands (10/25/17 1325)  Speech: Clear (10/25/17 1325)  LUE Motor Response: Purposeful (10/25/17 1325)  LLE Motor Response: Purposeful (10/25/17 1325)  RUE Motor Response: Purposeful (10/25/17 1325)  RLE Motor Response: Purposeful (10/25/17 1325)   At baseline    Mental Status and Level of Consciousness: Arousable    Pulmonary Status:   O2 Device: Nasal cannula (10/25/17 1345)   Adequate oxygenation and airway patent    Complications related to anesthesia: None    Post-anesthesia assessment completed.  No concerns    Signed By: Esther Pulido MD     October 25, 2017

## 2017-10-25 NOTE — PROGRESS NOTES
Cardiac Cath Lab Recovery Arrival Note:      [address] Hannah Darling arrived to Cardiac Cath Lab, Recovery Area. Staff introduced to patient. Patient identifiers verified with NAME and DATE OF BIRTH. Procedure verified with patient. Consent forms reviewed and signed by patient or authorized representative and verified. Allergies verified. Patient and family oriented to department. Patient and family informed of procedure and plan of care. Questions answered with review. Patient prepped for procedure, per orders from physician, prior to arrival.    Patient on cardiac monitor, non-invasive blood pressure, SPO2 monitor. On room air. Patient is A&Ox 3. Patient reports no c/o. Patient in stretcher, in low position, with side rails up, call bell within reach, patient instructed to call if assistance as needed. Patient prep in: [address], [place] [address]. Patient family has pager # none  Family in: [facility] waiting area.    Prep by: Brian Genao RN

## 2017-10-25 NOTE — IP AVS SNAPSHOT
[address] [facility] 
[phone] Patient: Mica Naranjo MRN: [id number] ADN:[id number] About your hospitalization You were admitted on:  October 25, 2017 You last received care in the:  [facility] CARDIO You were discharged on:  October 26, 2017 Why you were hospitalized Your primary diagnosis was:  Not on File Your diagnoses also included:  A-Fib (Hcc) Things You Need To Do (next 8 weeks) Follow up with Jason Bateman MD  
  
Phone:  [phone] [facility] [address] [address] Follow up with Brigida Mooney MD  
Please call and make a follow-up appointment for 1 week from now. Thank you! Phone:  [phone] Where:  [address], [address] [address] Tuesday Nov 14, 2017 PACEMAKER with PACEMAKER, RCAM at 10:45 AM  
Where:  [address] [address] HOSPITAL FOLLOW-UP with Linda Hill NP at 10:45 AM  
Where:  [facility] [address] [place] [address]) Discharge Orders None A check mandeep indicates which time of day the medication should be taken. My Medications TAKE these medications as instructed Instructions Each Dose to Equal  
 Morning Noon Evening Bedtime  
 amLODIPine 5 mg tablet Commonly known as:  Tami Tripathi Your last dose was: Your next dose is: TAKE ONE TABLET BY MOUTH ONE TIME DAILY Iron 160 mg (50 mg iron) Tber tablet Generic drug:  ferrous sulfate ER Your last dose was: Your next dose is: Take 1 Tab by mouth daily. 1 Tab  
    
   
   
   
  
 lovastatin 40 mg tablet Commonly known as:  MEVACOR Your last dose was: Your next dose is: TAKE ONE TABLET BY MOUTH ONE TIME DAILY potassium chloride SR 20 mEq tablet Commonly known as:  K-TAB Your last dose was: Your next dose is: TAKE ONE TABLET BY MOUTH THREE TIMES DAILY  
     
   
   
   
  
 rivaroxaban 15 mg Tab tablet Commonly known as:  Christina Peñaloza Your last dose was: Your next dose is: Take 1 Tab by mouth daily (with dinner). 15 mg  
    
   
   
   
  
 triamterene-hydroCHLOROthiazide 37.5-25 mg per tablet Commonly known as:  Mary Anne Do Your last dose was: Your next dose is: Take  by mouth daily. VITAMIN D3 2,000 unit Tab Generic drug:  cholecalciferol (vitamin D3) Your last dose was: Your next dose is: Take 2,000 Units by mouth daily. Indications: PREVENTION OF VITAMIN D DEFICIENCY  
 2000 Units Discharge Instructions None Introducing [facility] SERVICES! Dear Julian Villafana: Thank you for requesting a Entrec account. Our records indicate that you already have an active Entrec account. You can access your account anytime at [email or web address] Did you know that you can access your hospital and ER discharge instructions at any time in Entrec? You can also review all of your test results from your hospital stay or ER visit. Additional Information If you have questions, please visit the Frequently Asked Questions section of the Entrec website at [email or web address] Remember, Entrec is NOT to be used for urgent needs. For medical emergencies, dial 911. Now available from your iPhone and Android! Providers Seen During Your Hospitalization Provider Specialty Primary office phone Miles Gillis MD Cardiology [phone] Your Primary Care Physician (PCP) Primary Care Physician Office Phone Office Fax Arjun Parmar [phone] You are allergic to the following No active allergies Recent Documentation Height Weight BMI Smoking Status 1.727 m 90.7 kg 30.41 kg/m2 Never Smoker Emergency Contacts Name Discharge Info Relation Home Work Mobile Zac Paez DISCHARGE CAREGIVER [3] Child [2]   [phone] Myrtle Celestin DISCHARGE CAREGIVER [3] Child [2] [phone] [address] [facility] CAREGIVER [3] Child [2]   [phone] Patient Belongings The following personal items are in your possession at time of discharge: 
  Dental Appliances: None  Visual Aid: Glasses   Hearing Aids/Status: Functioning  Home Medications: None   Jewelry: None  Clothing: At bedside    Other Valuables: None  Personal Items Sent to Safe: none Please provide this summary of care documentation to your next provider. Signatures-by signing, you are acknowledging that this After Visit Summary has been reviewed with you and you have received a copy. Patient Signature:  ____________________________________________________________ Date:  ____________________________________________________________  
  
Krupa Metz Provider Signature:  ____________________________________________________________ Date:  ____________________________________________________________

## 2017-10-25 NOTE — PROCEDURES
[address], [place], [address] [facility]  [phone]    Indications and Pre-Procedure Diagnosis:  Kristin Smith is a 79 y.o. male with AF is referred for implantable loop recorder. Post Procedure Diagnosis:  AF    Loop Recorder Implant Procedure and Findings:  Informed consent was obtained. The procedure was performed under local anesthesia. Continuous pulse oximetry and cuff pressure were monitored. During the procedure, the patient received Versed and Fentanyl for sedation. The mid-chest area was prepped and draped in the usual sterile fashion and was liberally infiltrated with 1% lidocaine. An incision was made and the device implanted. Manual pressure was held until hemostasis was achieved. Total procedure times was 15 minutes. Estimated blood loss <10 ml. Sharp count: correct. Specimen(s) collected: none. The following procedure related complication occurred: none. The following problems were encountered: none. Findings: successful loop recorder placement. Final Programmed Parameters  VT  180 bpm  VF  210 bpm  Mauricio  40 bpm  Asystole  ON  AT/AF  ON    Supplies Summary available in the chart  [employer]    Thank you for allowing me to participate in this patients care.     Kong Lerner MD, Ana Montoya

## 2017-10-25 NOTE — H&P (VIEW-ONLY)
Subjective:      Mica Naranjo is a 79 y.o. male is here for EP consult. He had a placido/cardioversion last week after presenting with symptomatic AF. The patient denies chest pain/ shortness of breath, orthopnea, PND, LE edema, palpitations, syncope, presyncope or fatigue.        Patient Active Problem List    Diagnosis Date Noted    Irregular heart beat 10/12/2017    Sepsis ([address] 08/20/2016    SSS (sick sinus syndrome) ([address] 11/30/2015    Hypertrophic cardiomyopathy ([address] 12/03/2014    Atrial fibrillation ([address] 11/12/2014    HTN (hypertension) 11/12/2014    Mixed hyperlipidemia 11/12/2014      Jason Bateman MD  Past Medical History:   Diagnosis Date    Arrhythmia     a fib    Cancer ([address]     skin, throat cancer    G tube feedings ([address]     PEG    High cholesterol     Hypertension     Hypertrophic cardiomyopathy ([address]     per cardiology notes 6/2013    Status post chemoradiation     completed 12/2013    Throat cancer [facility])       Past Surgical History:   Procedure Laterality Date    COLONOSCOPY N/A 1/17/2017    COLONOSCOPY performed by Cary Holcomb MD at [facility] ENDOSCOPY    HX HEART CATHETERIZATION  2007    HX ORTHOPAEDIC      Rt. hand surgery    HX OTHER SURGICAL      exc. skin cancer    HX OTHER SURGICAL      peg tube inserted    HX TONSILLECTOMY      MA EGD BALLOON DILATION ESOPHAGUS <30 MM DIAM  11/8/2013          No Known Allergies   Family History   Problem Relation Age of Onset    Heart Attack Mother     Cancer Father     negative for cardiac disease  Social History     Social History    Marital status:      Spouse name: N/A    Number of children: N/A    Years of education: N/A     Social History Main Topics    Smoking status: Never Smoker    Smokeless tobacco: Former User     Quit date: 3/30/2013    Alcohol use 0.0 oz/week     0 Standard drinks or equivalent per week      Comment: occasional    Drug use: No    Sexual activity: Not Currently     Other Topics Concern    None     Social History Narrative     Current Outpatient Prescriptions   Medication Sig    potassium chloride SR (K-TAB) 20 mEq tablet TAKE ONE TABLET BY MOUTH THREE TIMES DAILY  (Patient taking differently: twice daily)    amLODIPine (NORVASC) 5 mg tablet TAKE ONE TABLET BY MOUTH ONE TIME DAILY     lovastatin (MEVACOR) 40 mg tablet TAKE ONE TABLET BY MOUTH ONE TIME DAILY     rivaroxaban (XARELTO) 15 mg tab tablet Take 1 Tab by mouth daily (with dinner). (Patient taking differently: Take 15 mg by mouth daily (with breakfast). )    cholecalciferol, vitamin D3, (VITAMIN D3) 2,000 unit tab Take 2,000 Units by mouth daily. Indications: PREVENTION OF VITAMIN D DEFICIENCY    triamterene-hydrochlorothiazide (MAXZIDE) 37.5-25 mg per tablet Take  by mouth daily. No current facility-administered medications for this visit. Vitals:    10/12/17 1004   BP: 142/72   Pulse: (!) 49   Resp: 18   SpO2: 98%   Weight: 209 lb 9.6 oz (95.1 kg)   Height: 5' 8\" (1.727 m)       I have reviewed the nurses notes, vitals, problem list, allergy list, medical history, family, social history and medications. Review of Symptoms:    General: Pt denies excessive weight gain or loss. Pt is able to conduct ADL's  HEENT: Denies blurred vision, headaches, epistaxis and difficulty swallowing. Respiratory: Denies shortness of breath, DUMONT, wheezing or stridor. Cardiovascular: Denies precordial pain, palpitations, edema or PND  Gastrointestinal: Denies poor appetite, indigestion, abdominal pain or blood in stool  Urinary: Denies dysuria, pyuria  Musculoskeletal: Denies pain or swelling from muscles or joints  Neurologic: Denies tremor, paresthesias, or sensory motor disturbance  Skin: Denies rash, itching or texture change. Psych: Denies depression      Physical Exam:      General: Well developed, in no acute distress. HEENT: Eyes - PERRL, no jvd  Heart:  Normal S1/S2 negative S3 or S4.  Regular, no murmur, gallop or rub.   Respiratory: Clear bilaterally x 4, no wheezing or rales  Abdomen:   Soft, non-tender, bowel sounds are active.   Extremities:  No edema, normal cap refill, no cyanosis. Musculoskeletal: No clubbing  Neuro: A&Ox3, speech clear, gait stable. Skin: Skin color is normal. No rashes or lesions. Non diaphoretic  Vascular: 2+ pulses symmetric in all extremities    Cardiographics    Ekg: s jonathon    Results for orders placed or performed during the hospital encounter of 10/05/17   EKG, 12 LEAD, INITIAL   Result Value Ref Range    Ventricular Rate 49 BPM    Atrial Rate 49 BPM    P-R Interval 168 ms    QRS Duration 86 ms    Q-T Interval 498 ms    QTC Calculation (Bezet) 449 ms    Calculated P Axis 51 degrees    Calculated R Axis 26 degrees    Calculated T Axis -171 degrees    Diagnosis       Marked sinus bradycardia  Left ventricular hypertrophy with repolarization abnormality  Confirmed by Ludivina Mcclellan ([id number]) on 10/5/2017 10:17:39 PM     Results for orders placed or performed in visit on 11/30/15   CARDIAC HOLTER MONITOR, 24 HOURS    Narrative    ECG Monitor/24 hours, Complete    Reason for Holter Monitor   BRADYCARDIA    Heartbeat    Slowest 37  Average 48  Fastest  75          Results:   Underlying Rhythm: Sinus bradycardia      Atrial Arrhythmias: premature atrial contractions; frequent             AV Conduction: normal    Ventricular Arrhythmias: premature ventricular contractions;  occasional     ST Segment Analysis:non-specific changes     Symptom Correlation:  none    Comment:   Sinus bradycardia, no significant pauses.      Myra Osuna MD                   Lab Results   Component Value Date/Time    WBC 8.1 08/26/2016 12:38 AM    HGB 12.8 08/26/2016 12:38 AM    HCT 37.0 08/26/2016 12:38 AM    PLATELET 022 30/67/2317 12:38 AM    MCV 92.3 08/26/2016 12:38 AM      Lab Results   Component Value Date/Time    Sodium 145 10/04/2017 10:59 AM    Potassium 3.9 10/04/2017 10:59 AM    Chloride 101 10/04/2017 10:59 AM    CO2 28 10/04/2017 10:59 AM    Anion gap 7 08/26/2016 12:38 AM    Glucose 98 10/04/2017 10:59 AM    BUN 27 10/04/2017 10:59 AM    Creatinine 1.13 10/04/2017 10:59 AM    BUN/Creatinine ratio 24 10/04/2017 10:59 AM    GFR est AA 77 10/04/2017 10:59 AM    GFR est non-AA 67 10/04/2017 10:59 AM    Calcium 9.8 10/04/2017 10:59 AM    Bilirubin, total 0.4 10/04/2017 10:59 AM    AST (SGOT) 22 10/04/2017 10:59 AM    Alk. phosphatase 51 10/04/2017 10:59 AM    Protein, total 6.4 10/04/2017 10:59 AM    Albumin 4.2 10/04/2017 10:59 AM    Globulin 3.5 08/21/2016 04:07 AM    A-G Ratio 1.9 10/04/2017 10:59 AM    ALT (SGPT) 19 10/04/2017 10:59 AM         Assessment:     Assessment:        ICD-10-CM ICD-9-CM    1. Irregular heart beat I49.9 427.9 AMB POC EKG ROUTINE W/ 12 LEADS, INTER & REP      INES DOPPLER      ABLATE      CBC WITH AUTOMATED DIFF      PROTHROMBIN TIME + INR      METABOLIC PANEL, COMPREHENSIVE      XR CHEST PA LAT      MRI CARDIAC MORPH FUNC W WO CONT   2. SSS (sick sinus syndrome) (HCC) I49.5 427.81 AMB POC EKG ROUTINE W/ 12 LEADS, INTER & REP      INES DOPPLER      ABLATE      CBC WITH AUTOMATED DIFF      PROTHROMBIN TIME + INR      METABOLIC PANEL, COMPREHENSIVE      XR CHEST PA LAT      MRI CARDIAC MORPH FUNC W WO CONT   3. Paroxysmal atrial fibrillation (HCC) I48.0 427.31 AMB POC EKG ROUTINE W/ 12 LEADS, INTER & REP      INES DOPPLER      ABLATE      CBC WITH AUTOMATED DIFF      PROTHROMBIN TIME + INR      METABOLIC PANEL, COMPREHENSIVE      XR CHEST PA LAT      MRI CARDIAC MORPH FUN W WO CONT     Orders Placed This Encounter    XR CHEST PA LAT     Standing Status:   Future     Standing Expiration Date:   11/12/2018     Order Specific Question:   Reason for Exam     Answer:   pre op     Order Specific Question:   Is Patient Allergic to Contrast Dye?      Answer:   [facility]ont[facility] [place] FUN W WO CONT     Standing Status:   Future     Standing Expiration Date:   11/12/2018     Order Specific Question:   Is Patient Allergic to Contrast Dye? Answer:   Unknown     Order Specific Question:   Reason for Exam     Answer:   AF     Order Specific Question:   Stat POC Creatinine as needed for Radiology Policy     Answer: Yes    CBC WITH AUTOMATED DIFF    PROTHROMBIN TIME + INR    METABOLIC PANEL, COMPREHENSIVE    ABLATE     afib     Order Specific Question:   Reason for Exam:     Answer:   AF    AMB POC EKG ROUTINE W/ 12 LEADS, INTER & REP     Order Specific Question:   Reason for Exam:     Answer:   routine    INES DOPPLER     Order Specific Question:   Reason for Exam:     Answer:   AF        Plan:   Mr Wen Mcgraw is a pleasant gentleman with symptomatic p AF. He is not a candidate for med rx due to his marked bradycardia at baseline. He is a candidate for an afib ablation/ILR. I discussed the risks/benefits/alternatives of the procedure with the patient. Risks include (but are not limited to) bleeding, heart block, infection, cva/mi/tamponade/esophageal perforation/pv stenosis/death. The patient understands that there is a 3-8% major complication rate and agrees to proceed. Thank you for this interesting consultation. Continue medical management for AF. Thank you for allowing me to participate in Greyson Mina 's care.     Sana Lyon MD, Elizabeth Pham

## 2017-10-25 NOTE — PERIOP NOTES
Handoff Report from Operating Room to PACU    Report received from [address] [place] [address] and Doris Roland CRNA regarding Emma VogtAlis Restrepo      Surgeon(s):  Case Anesthesia  And Procedure(s) (LRB):  PACU/RECOVERY                       EP/LAB (N/A)  confirmed   with allergies, drains and dressings discussed. Anesthesia type, drugs, patient history, complications, estimated blood loss, vital signs, intake and output, and last pain medication, lines, reversal medications and temperature were reviewed.

## 2017-10-25 NOTE — INTERVAL H&P NOTE
H&P Update:  Preeti Marie. was seen and examined. History and physical has been reviewed. The patient has been examined.  There have been no significant clinical changes since the completion of the originally dated History and Physical.    Signed By: Jodee Suero MD     October 25, 2017 9:51 AM

## 2017-10-25 NOTE — PROGRESS NOTES
Bedside report received from  JANIA Lakhani               Assessment, Background, Procedure summary, Intake/Output, MAR, and recent results discussed. Care assumed. Right groin oozed with pressure. Bladder appears to be full. Instructed pt to try to void, if not may have to straight cath. Visitor at bedside. Call bell in reach.

## 2017-10-25 NOTE — PERIOP NOTES
TRANSFER - OUT REPORT:    Verbal report given to JANIA Lopez on Steffany Abbasi.  being transferred to [place] [address] for routine progression of care       Report consisted of patients Situation, Background, Assessment and   Recommendations(SBAR). Information from the following report(s) SBAR, Kardex, OR Summary, Intake/Output, MAR and Cardiac Rhythm NSR was reviewed with the receiving nurse. Opportunity for questions and clarification was provided.       Patient transported with:   Monitor  O2 @ 2 liters  Registered Nurse  Tech     Informed JANIA Lopez that patient has inverted T waves which were present upon arrival to PACU and reported by Melissa Young CRNA patient had inverted T waves during case

## 2017-10-25 NOTE — PROGRESS NOTES
Verbal report given to oncoming nurse Alisha Orlando RN    Report consisted of patients Situation, Background, Assessment, and   Recommendations    Information was reviewed with the receiving nurse. Opportunity for questions and clarification was provided.

## 2017-10-25 NOTE — PROGRESS NOTES
Attempted to void but unsuccessful. States it burns. Right groin dressing changed and new replaced. Doesn't seem to be bleeding at present time but stressed the importance of emptying his bladder.

## 2017-10-26 VITALS
HEART RATE: 73 BPM | SYSTOLIC BLOOD PRESSURE: 151 MMHG | OXYGEN SATURATION: 93 % | DIASTOLIC BLOOD PRESSURE: 94 MMHG | BODY MASS INDEX: 30.31 KG/M2 | WEIGHT: 200 LBS | RESPIRATION RATE: 18 BRPM | HEIGHT: 68 IN | TEMPERATURE: 98.5 F

## 2017-10-26 LAB
ATRIAL RATE: 69 BPM
CALCULATED P AXIS, ECG09: 75 DEGREES
CALCULATED R AXIS, ECG10: 53 DEGREES
CALCULATED T AXIS, ECG11: -148 DEGREES
DIAGNOSIS, 93000: NORMAL
P-R INTERVAL, ECG05: 170 MS
Q-T INTERVAL, ECG07: 436 MS
QRS DURATION, ECG06: 88 MS
QTC CALCULATION (BEZET), ECG08: 467 MS
VENTRICULAR RATE, ECG03: 69 BPM

## 2017-10-26 PROCEDURE — 99218 HC RM OBSERVATION: CPT

## 2017-10-26 PROCEDURE — 74011250637 HC RX REV CODE- 250/637: Performed by: INTERNAL MEDICINE

## 2017-10-26 PROCEDURE — 93005 ELECTROCARDIOGRAM TRACING: CPT

## 2017-10-26 RX ADMIN — AMLODIPINE BESYLATE 5 MG: 5 TABLET ORAL at 09:08

## 2017-10-26 RX ADMIN — Medication 10 ML: at 05:16

## 2017-10-26 RX ADMIN — TRIAMTERENE AND HYDROCHLOROTHIAZIDE 1 TABLET: 37.5; 25 TABLET ORAL at 09:08

## 2017-10-26 NOTE — PROGRESS NOTES
Cardiology Progress Note            [address]  [phone]    10/26/2017 8:30 AM    Admit Date: 10/25/2017    Admit Diagnosis: a fib;A FIB;A-fib [facility])    Subjective:     Lolis Foster.   denies chest pain. Visit Vitals    /63    Pulse 72    Temp 98.5 °F (36.9 °C)    Resp 17    Ht 5' 8\" (1.727 m)    Wt 200 lb (90.7 kg)    SpO2 96%    BMI 30.41 kg/m2     Current Facility-Administered Medications   Medication Dose Route Frequency    midazolam (VERSED) injection 1-5 mg  1-5 mg IntraVENous Multiple    DOBUTamine (DOBUTREX) 500 mg/250 mL (2,000 mcg/mL) infusion  0-10 mcg/kg/min IntraVENous TITRATE    protamine injection  mg   mg IntraVENous Multiple    DOBUTamine (DOBUTREX) 500 mg/250 mL (2,000 mcg/mL) infusion        triamterene-hydroCHLOROthiazide (MAXZIDE) 37.5-25 mg per tablet 1 Tab  1 Tab Oral DAILY    rivaroxaban (XARELTO) tablet 15 mg  15 mg Oral DAILY WITH DINNER    pravastatin (PRAVACHOL) tablet 40 mg  40 mg Oral QPM    amLODIPine (NORVASC) tablet 5 mg  5 mg Oral DAILY    sodium chloride (NS) flush 5-10 mL  5-10 mL IntraVENous Q8H    sodium chloride (NS) flush 5-10 mL  5-10 mL IntraVENous PRN    acetaminophen (TYLENOL) tablet 650 mg  650 mg Oral Q4H PRN    HYDROcodone-acetaminophen (NORCO) 5-325 mg per tablet 1 Tab  1 Tab Oral Q4H PRN    lidocaine-EPINEPHrine (XYLOCAINE) 1 %-1:100,000 injection  mg  1-20 mL SubCUTAneous Multiple         Objective:      Visit Vitals    /63    Pulse 72    Temp 98.5 °F (36.9 °C)    Resp 17    Ht 5' 8\" (1.727 m)    Wt 200 lb (90.7 kg)    SpO2 96%    BMI 30.41 kg/m2       Physical Exam:  Abdomen: soft, non-tender  Extremities: extremities normal  Heart: regular rate and rhythm  Lungs: clear to auscultation bilaterally  Pulses: 2+ and symmetric    Data Review:   Labs:    No results for input(s): WBC, HGB, HCT, PLT, HGBEXT, HCTEXT, PLTEXT in the last 72 hours.   No results for input(s): NA, K, CL, CO2, GLU, BUN, CREA, CA, MG, PHOS, ALB, TBIL, TBILI, SGOT, ALT, INR in the last 72 hours. No lab exists for component:  BNP, INREXT    No results for input(s): TROIQ, CPK, CKMB in the last 72 hours. Intake/Output Summary (Last 24 hours) at 10/26/17 0830  Last data filed at 10/25/17 2249   Gross per 24 hour   Intake             1630 ml   Output                0 ml   Net             1630 ml        Telemetry: sr    Assessment:     Active Problems:    A-fib (HCC) (10/25/2017)        Plan:     Natacha Perez. is sp PVI and ILR. He is in sinus and asymptomatic. [id number] [address]ultz for [employer].  F/u in one week    Blayne Rosenthal MD, [facility]    10/26/2017

## 2017-10-26 NOTE — PROGRESS NOTES
Patient ambulated in hallway without difficulty. Dressing CDI. No complaints. Discharge instructions reviewed with patient; to be discharged to home with friend. Site care instructions reviewed; site(s) CDI. Patient instructed on which medications to continue. Medication info provided and reviewed with patient. Follow-up appointment information given; follow-up appointment to be made by patient. IV and tele box removed. Opportunity for questions provided; all questions answered. All belongings returned. Patient wheeled to front door via wheelchair by volunteer; to be transported home by friend.

## 2017-10-31 LAB
ACT BLD: 125 SECS (ref 79–138)
ACT BLD: 268 SECS (ref 79–138)
ACT BLD: 362 SECS (ref 79–138)

## 2017-11-06 ENCOUNTER — OFFICE VISIT (OUTPATIENT)
Dept: CARDIOLOGY CLINIC | Age: 67
End: 2017-11-06

## 2017-11-06 VITALS
SYSTOLIC BLOOD PRESSURE: 140 MMHG | WEIGHT: 211.4 LBS | RESPIRATION RATE: 18 BRPM | BODY MASS INDEX: 32.04 KG/M2 | HEIGHT: 68 IN | OXYGEN SATURATION: 96 % | HEART RATE: 55 BPM | DIASTOLIC BLOOD PRESSURE: 88 MMHG

## 2017-11-06 DIAGNOSIS — Z98.890 S/P ABLATION OF ATRIAL FIBRILLATION: ICD-10-CM

## 2017-11-06 DIAGNOSIS — I10 ESSENTIAL HYPERTENSION: ICD-10-CM

## 2017-11-06 DIAGNOSIS — E78.2 MIXED HYPERLIPIDEMIA: ICD-10-CM

## 2017-11-06 DIAGNOSIS — I48.91 ATRIAL FIBRILLATION, UNSPECIFIED TYPE (HCC): Primary | ICD-10-CM

## 2017-11-06 DIAGNOSIS — Z86.79 S/P ABLATION OF ATRIAL FIBRILLATION: ICD-10-CM

## 2017-11-06 NOTE — MR AVS SNAPSHOT
Visit Information Date & Time Provider Department Dept. Phone Encounter #  
 11/6/2017  3:15 PM Melody Norton E formerly [facility] [phone] [id number] Upcoming Health Maintenance Date Due Hepatitis C Screening 1950 DTaP/Tdap/Td series (1 - Tdap) 1/4/1971 FOBT Q 1 YEAR AGE 50-75 1/4/2000 ZOSTER VACCINE AGE 60> 11/4/2009 GLAUCOMA SCREENING Q2Y 1/4/2015 Pneumococcal 65+ Low/Medium Risk (1 of 2 - PCV13) 1/4/2015 MEDICARE YEARLY EXAM 1/4/2015 INFLUENZA AGE 9 TO ADULT 8/1/2017 Allergies as of 11/6/2017  Review Complete On: 11/6/2017 By: Ana Lilia Scanlon NP No Known Allergies Current Immunizations  Never Reviewed No immunizations on file. Not reviewed this visit You Were Diagnosed With   
  
 Codes Comments Atrial fibrillation, unspecified type ([facility]    -  Primary ICD-10-CM: I48.91 
ICD-9-CM: 427.31 Essential hypertension     ICD-10-CM: I10 
ICD-9-CM: 401.9 Mixed hyperlipidemia     ICD-10-CM: E78.2 ICD-9-CM: 272.2 S/P ablation of atrial fibrillation     ICD-10-CM: Z98.890, Z86.79 
ICD-9-CM: V45.89 Vitals BP Pulse Resp Height(growth percentile) Weight(growth percentile) SpO2  
 140/88 (BP 1 Location: Left arm, BP Patient Position: Sitting) (!) 55 18 5' 8\" (1.727 m) 211 lb 6.4 oz (95.9 kg) 96% BMI Smoking Status 32.14 kg/m2 Never Smoker Vitals History BMI and BSA Data Body Mass Index Body Surface Area  
 32.14 kg/m 2 2.15 m 2 Preferred Pharmacy Pharmacy Name Phone [facility] [phone] Your Updated Medication List  
  
   
This list is accurate as of: 11/6/17  3:36 PM.  Always use your most recent med list. amLODIPine 5 mg tablet Commonly known as:  Didi Eagles TAKE ONE TABLET BY MOUTH ONE TIME DAILY Iron 160 mg (50 mg iron) Tber tablet Generic drug:  ferrous sulfate ER  
 Take 1 Tab by mouth daily. lovastatin 40 mg tablet Commonly known as:  MEVACOR  
TAKE ONE TABLET BY MOUTH ONE TIME DAILY potassium chloride SR 20 mEq tablet Commonly known as:  K-TAB  
TAKE ONE TABLET BY MOUTH THREE TIMES DAILY  
  
 rivaroxaban 15 mg Tab tablet Commonly known as:  Rockwell Root Take 1 Tab by mouth daily (with dinner). triamterene-hydroCHLOROthiazide 37.5-25 mg per tablet Commonly known as:  Zhao Haver Take  by mouth daily. VITAMIN D3 2,000 unit Tab Generic drug:  cholecalciferol (vitamin D3) Take 2,000 Units by mouth daily. Indications: PREVENTION OF VITAMIN D DEFICIENCY We Performed the Following AMB POC EKG ROUTINE W/ 12 LEADS, INTER & REP [99899 CPT(R)] Introducing [facility] SERVICES! Dear Keena Aguilar: Thank you for requesting a [employer] account. Our records indicate that you already have an active [employer] account. You can access your account anytime at [email or web address] Did you know that you can access your hospital and ER discharge instructions at any time in [employer]? You can also review all of your test results from your hospital stay or ER visit. Additional Information If you have questions, please visit the Frequently Asked Questions section of the [employer] website at [email or web address] Remember, [employer] is NOT to be used for urgent needs. For medical emergencies, dial 911. Now available from your iPhone and Android! Please provide this summary of care documentation to your next provider. Your primary care clinician is listed as Roderick Kaplan. If you have any questions after today's visit, please call [phone].

## 2017-11-06 NOTE — PROGRESS NOTES
Subjective:      Dori Davalos. is a 79 y.o. male is here for follow up of AF s/p AF/AFL ablation. He is doing well. The patient denies chest pain/ shortness of breath, orthopnea, PND, LE edema, palpitations, syncope, presyncope or fatigue.        Patient Active Problem List    Diagnosis Date Noted    A-fib [facility]) 10/25/2017    Irregular heart beat 10/12/2017    Sepsis ([address] 08/20/2016    SSS (sick sinus syndrome) ([address] 11/30/2015    Hypertrophic cardiomyopathy ([address] 12/03/2014    Atrial fibrillation ([address] 11/12/2014    HTN (hypertension) 11/12/2014    Mixed hyperlipidemia 11/12/2014      Magalie Dwyer MD  Past Medical History:   Diagnosis Date    Arrhythmia     a fib    Cancer ([address]     skin, throat cancer    G tube feedings ([address]     PEG    High cholesterol     Hypertension     Hypertrophic cardiomyopathy ([address]     per cardiology notes 6/2013    Status post chemoradiation     completed 12/2013    Throat cancer [facility])       Past Surgical History:   Procedure Laterality Date    COLONOSCOPY N/A 1/17/2017    COLONOSCOPY performed by Star Grijalva MD at [facility] ENDOSCOPY    HX HEART CATHETERIZATION  2007    HX ORTHOPAEDIC      Rt. hand surgery    HX OTHER SURGICAL      exc. skin cancer    HX OTHER SURGICAL      peg tube inserted    HX TONSILLECTOMY      [place] EGD BALLOON DILATION ESOPHAGUS <30 MM DIAM  11/8/2013          No Known Allergies   Family History   Problem Relation Age of Onset    Heart Attack Mother     Cancer Father     negative for cardiac disease  Social History     Social History    Marital status:      Spouse name: N/A    Number of children: N/A    Years of education: N/A     Social History Main Topics    Smoking status: Never Smoker    Smokeless tobacco: Former User     Quit date: 3/30/2013    Alcohol use 0.0 oz/week     0 Standard drinks or equivalent per week      Comment: occasional    Drug use: No    Sexual activity: Not Currently     Other Topics Concern    None     Social History Narrative     Current Outpatient Prescriptions   Medication Sig    ferrous sulfate ER (IRON) 160 mg (50 mg iron) TbER tablet Take 1 Tab by mouth daily.  potassium chloride SR (K-TAB) 20 mEq tablet TAKE ONE TABLET BY MOUTH THREE TIMES DAILY  (Patient taking differently: twice daily)    amLODIPine (NORVASC) 5 mg tablet TAKE ONE TABLET BY MOUTH ONE TIME DAILY     lovastatin (MEVACOR) 40 mg tablet TAKE ONE TABLET BY MOUTH ONE TIME DAILY     rivaroxaban (XARELTO) 15 mg tab tablet Take 1 Tab by mouth daily (with dinner). (Patient taking differently: Take 15 mg by mouth daily (with breakfast). )    cholecalciferol, vitamin D3, (VITAMIN D3) 2,000 unit tab Take 2,000 Units by mouth daily. Indications: PREVENTION OF VITAMIN D DEFICIENCY    triamterene-hydrochlorothiazide (MAXZIDE) 37.5-25 mg per tablet Take  by mouth daily. No current facility-administered medications for this visit. Vitals:    11/06/17 1507 11/06/17 1511   BP: 152/88 140/88   Pulse: (!) 55    Resp: 18    SpO2: 96%    Weight: 211 lb 6.4 oz (95.9 kg)    Height: 5' 8\" (1.727 m)        I have reviewed the nurses notes, vitals, problem list, allergy list, medical history, family, social history and medications. Review of Symptoms:    General: Pt denies excessive weight gain or loss. Pt is able to conduct ADL's  HEENT: Denies blurred vision, headaches, epistaxis and difficulty swallowing. Respiratory: Denies shortness of breath, DUMONT, wheezing or stridor. Cardiovascular: Denies precordial pain, palpitations, edema or PND  Gastrointestinal: Denies poor appetite, indigestion, abdominal pain or blood in stool  Urinary: Denies dysuria, pyuria  Musculoskeletal: Denies pain or swelling from muscles or joints  Neurologic: Denies tremor, paresthesias, or sensory motor disturbance  Skin: Denies rash, itching or texture change.   Psych: Denies depression      Physical Exam:      General: Well developed, in no acute distress. HEENT: Eyes - PERRL, no jvd  Heart:  Normal S1/S2 negative S3 or S4. Regular, no murmur, gallop or rub.   Respiratory: Clear bilaterally x 4, no wheezing or rales  Abdomen:   Soft, non-tender, bowel sounds are active.   Extremities:  No edema, normal cap refill, no cyanosis. Musculoskeletal: No clubbing  Neuro: A&Ox3, speech clear, gait stable. Skin: Skin color is normal. No rashes or lesions. Non diaphoretic  Vascular: 2+ pulses symmetric in all extremities    Cardiographics    Ekg: sinus bradycardia    Results for orders placed or performed during the hospital encounter of 10/25/17   EKG, 12 LEAD, INITIAL   Result Value Ref Range    Ventricular Rate 69 BPM    Atrial Rate 69 BPM    P-R Interval 170 ms    QRS Duration 88 ms    Q-T Interval 436 ms    QTC Calculation (Bezet) 467 ms    Calculated P Axis 75 degrees    Calculated R Axis 53 degrees    Calculated T Axis -148 degrees    Diagnosis       Normal sinus rhythm  Left ventricular hypertrophy with repolarization abnormality  When compared with ECG of 05-OCT-2017 10:23,  ST more depressed in Inferior leads  ST now depressed in Anterior leads  Confirmed by Jayden Schaefer ([id number]) on 10/26/2017 3:07:29 PM     Results for orders placed or performed in visit on 11/30/15   CARDIAC HOLTER MONITOR, 24 HOURS    Narrative    ECG Monitor/24 hours, Complete    Reason for Holter Monitor   BRADYCARDIA    Heartbeat    Slowest 37  Average 48  Fastest  75          Results:   Underlying Rhythm: Sinus bradycardia      Atrial Arrhythmias: premature atrial contractions; frequent             AV Conduction: normal    Ventricular Arrhythmias: premature ventricular contractions;  occasional     ST Segment Analysis:non-specific changes     Symptom Correlation:  none    Comment:   Sinus bradycardia, no significant pauses.      Jammie Muñoz MD                   Lab Results   Component Value Date/Time    WBC 5.0 10/20/2017 12:07 PM    HGB 14.6 10/20/2017 12:07 PM HCT 42.0 10/20/2017 12:07 PM    PLATELET 726 43/42/4910 12:07 PM    MCV 92 10/20/2017 12:07 PM      Lab Results   Component Value Date/Time    Sodium 146 10/20/2017 12:07 PM    Potassium 3.9 10/20/2017 12:07 PM    Chloride 101 10/20/2017 12:07 PM    CO2 28 10/20/2017 12:07 PM    Anion gap 7 08/26/2016 12:38 AM    Glucose 109 10/20/2017 12:07 PM    BUN 23 10/20/2017 12:07 PM    Creatinine 1.06 10/20/2017 12:07 PM    BUN/Creatinine ratio 22 10/20/2017 12:07 PM    GFR est AA 84 10/20/2017 12:07 PM    GFR est non-AA 72 10/20/2017 12:07 PM    Calcium 9.3 10/20/2017 12:07 PM    Bilirubin, total 0.6 10/20/2017 12:07 PM    AST (SGOT) 18 10/20/2017 12:07 PM    Alk. phosphatase 50 10/20/2017 12:07 PM    Protein, total 6.5 10/20/2017 12:07 PM    Albumin 4.1 10/20/2017 12:07 PM    Globulin 3.5 08/21/2016 04:07 AM    A-G Ratio 1.7 10/20/2017 12:07 PM    ALT (SGPT) 21 10/20/2017 12:07 PM         Assessment:     Assessment:        ICD-10-CM ICD-9-CM    1. Atrial fibrillation, unspecified type ([facility] I48.91 427.31 AMB POC EKG ROUTINE W/ 12 LEADS, INTER & REP   2. Essential hypertension I10 401.9    3. Mixed hyperlipidemia E78.2 272.2    4. S/P ablation of atrial fibrillation Z98.890 V45.89     Z86.79       Orders Placed This Encounter    AMB POC EKG ROUTINE W/ 12 LEADS, INTER & REP     Order Specific Question:   Reason for Exam:     Answer:   routine        Plan:   Mr. Vita Holstein is here for follow up s/p AF/AFL ablation. He denies cardiac complaints. EKG demonstrates normal sinus bradycardia and his ILR is negative for events. Continue current medical therapy and follow up with Dr. Maxwell Lopez in 3 months. Continue medical management for HTN, hyperlipidemia. Thank you for allowing me to participate in Fadi Grier 's care.     Jovani Kumar NP Urinary tract infection without hematuria, site unspecified Chronic atrial fibrillation Secondary hypertension

## 2017-11-08 RX ORDER — LOVASTATIN 40 MG/1
TABLET ORAL
Qty: 90 TAB | Refills: 0 | Status: SHIPPED | OUTPATIENT
Start: 2017-11-08 | End: 2018-02-06 | Stop reason: SDUPTHER

## 2017-11-22 ENCOUNTER — OFFICE VISIT (OUTPATIENT)
Dept: CARDIOLOGY CLINIC | Age: 67
End: 2017-11-22

## 2017-11-22 VITALS
HEIGHT: 68 IN | DIASTOLIC BLOOD PRESSURE: 90 MMHG | WEIGHT: 210.6 LBS | OXYGEN SATURATION: 93 % | BODY MASS INDEX: 31.92 KG/M2 | RESPIRATION RATE: 16 BRPM | SYSTOLIC BLOOD PRESSURE: 140 MMHG | HEART RATE: 58 BPM

## 2017-11-22 DIAGNOSIS — I48.0 PAROXYSMAL ATRIAL FIBRILLATION (HCC): Primary | ICD-10-CM

## 2017-11-22 DIAGNOSIS — I42.2 HYPERTROPHIC CARDIOMYOPATHY (HCC): ICD-10-CM

## 2017-11-22 DIAGNOSIS — E78.2 MIXED HYPERLIPIDEMIA: ICD-10-CM

## 2017-11-22 RX ORDER — FLUOROURACIL 50 MG/ML
SOLUTION TOPICAL
COMMUNITY
Start: 2017-11-16 | End: 2018-06-05

## 2017-11-22 NOTE — MR AVS SNAPSHOT
Visit Information Date & Time Provider Department Dept. Phone Encounter #  
 11/22/2017 11:15 AM [address], MD [facility] [phone] [id number] Your Appointments 2/20/2018 10:45 AM  
3 MONTH with [facility]) Appt Note: 3mo f/u -lj 11-6-17  
 [address] [facility]  
[phone] [address] [facility]  
  
    
  
 12/4/2017  8:00 AM  
REMOTE OFFICE VISIT with [facility]) Appt Note: NOT A OFFICE VISIT  REMOTE MDT ILR  
 [address] [facility]  
[phone] [address] [facility] Upcoming Health Maintenance Date Due Hepatitis C Screening 1950 DTaP/Tdap/Td series (1 - Tdap) 1/4/1971 FOBT Q 1 YEAR AGE 50-75 1/4/2000 ZOSTER VACCINE AGE 60> 11/4/2009 GLAUCOMA SCREENING Q2Y 1/4/2015 Pneumococcal 65+ Low/Medium Risk (1 of 2 - PCV13) 1/4/2015 MEDICARE YEARLY EXAM 1/4/2015 Influenza Age 5 to Adult 8/1/2017 Allergies as of 11/22/2017  Review Complete On: 11/22/2017 By: Barney Arzate NP No Known Allergies Current Immunizations  Never Reviewed No immunizations on file. Not reviewed this visit You Were Diagnosed With   
  
 Codes Comments Paroxysmal atrial fibrillation (HCC)    -  Primary ICD-10-CM: I48.0 ICD-9-CM: 427.31 Mixed hyperlipidemia     ICD-10-CM: E78.2 ICD-9-CM: 272.2 Hypertrophic cardiomyopathy (HCC)     ICD-10-CM: I42.2 ICD-9-CM: 425.18 Vitals BP Pulse Resp Height(growth percentile) Weight(growth percentile) SpO2  
 140/90 (BP 1 Location: Right arm, BP Patient Position: Sitting) (!) 58 16 5' 8\" (1.727 m) 210 lb 9.6 oz (95.5 kg) 93% BMI Smoking Status 32.02 kg/m2 Never Smoker Vitals History BMI and BSA Data Body Mass Index Body Surface Area 32.02 kg/m 2 2.14 m 2 Preferred Pharmacy Pharmacy Name Phone [facility] #[address] [phone] Your Updated Medication List  
  
   
This list is accurate as of: 11/22/17 11:51 AM.  Always use your most recent med list. amLODIPine 5 mg tablet Commonly known as:  Elspeth Bakes TAKE ONE TABLET BY MOUTH ONE TIME DAILY  
  
 fluorouracil 5 % Soln Iron 160 mg (50 mg iron) Tber tablet Generic drug:  ferrous sulfate ER Take 1 Tab by mouth daily. lovastatin 40 mg tablet Commonly known as:  MEVACOR  
TAKE ONE TABLET BY MOUTH ONE TIME DAILY potassium chloride SR 20 mEq tablet Commonly known as:  K-TAB  
TAKE ONE TABLET BY MOUTH THREE TIMES DAILY  
  
 rivaroxaban 15 mg Tab tablet Commonly known as:  Suresh Safe Take 1 Tab by mouth daily (with dinner). triamterene-hydroCHLOROthiazide 37.5-25 mg per tablet Commonly known as:  Karlene Perch Take  by mouth daily. VITAMIN D3 2,000 unit Tab Generic drug:  cholecalciferol (vitamin D3) Take 2,000 Units by mouth daily. Indications: PREVENTION OF VITAMIN D DEFICIENCY We Performed the Following AMB POC EKG ROUTINE W/ 12 LEADS, INTER & REP [27436 CPT(R)] To-Do List   
 11/27/2017 ECHO:  2D ECHO COMPLETE ADULT (TTE) W OR WO CONTR hospitals & HEALTH SERVICES! Dear Jarred Dixon: Thank you for requesting a [employer] account. Our records indicate that you already have an active [employer] account. You can access your account anytime at [email or web address] Did you know that you can access your hospital and ER discharge instructions at any time in [employer]? You can also review all of your test results from your hospital stay or ER visit. Additional Information If you have questions, please visit the Frequently Asked Questions section of the [employer] website at [email or web address] Remember, [employer] [facility] is NOT to be used for urgent needs. For medical emergencies, dial 911. Now available from your iPhone and Android! Please provide this summary of care documentation to your next provider. Your primary care clinician is listed as Shazia Tyler. If you have any questions after today's visit, please call [phone].

## 2017-11-22 NOTE — PROGRESS NOTES
1. Have you been to the ER, urgent care clinic since your last visit? Hospitalized since your last visit? Yes Where: ED [facility] for ablation     2. Have you seen or consulted any other health care providers outside of the [address] [place] [address] since your last visit? Include any pap smears or colon screening.  No    Chief Complaint   Patient presents with    Cholesterol Problem     6 mo f/fu    Hypertension     \"    Irregular Heart Beat     \"

## 2017-11-22 NOTE — PROGRESS NOTES
Clara Burrell DNP, ANP-BC  Subjective/HPI:     Siria Carbajal. is a 79 y.o. male is here for routine f/u. The patient denies chest pain/ shortness of breath, orthopnea, PND, LE edema, palpitations, syncope, presyncope or fatigue. Status post A. fib ablation doing well. Denies fluttering palpitations lightheadedness or dizziness. Clarify Xarelto order: Patient due to excessive bruising on 20 mg was placed on 15 mg at the patient's request due to excessive bruising and understands the possible subtherapeutic effect 15mg. PCP Provider  Sarah Beebe MD  Past Medical History:   Diagnosis Date    Arrhythmia     a fib    Cancer [address]     skin, throat cancer    G tube feedings ([facility]     PEG    High cholesterol     Hypertension     Hypertrophic cardiomyopathy ([facility]     per cardiology notes 6/2013    Status post chemoradiation     completed 12/2013    Throat cancer [facility])       Past Surgical History:   Procedure Laterality Date    COLONOSCOPY N/A 1/17/2017    COLONOSCOPY performed by Paris Riggs MD at [facility] ENDOSCOPY    [address] [address]  [address]    HX ORTHOPAEDIC      Rt. hand surgery    HX OTHER SURGICAL      exc. skin cancer    HX OTHER SURGICAL      peg tube inserted    HX TONSILLECTOMY      [place] EGD BALLOON DILATION ESOPHAGUS <30 MM DIAM  11/8/2013          No Known Allergies   Family History   Problem Relation Age of Onset    Heart Attack Mother     Cancer Father       Current Outpatient Prescriptions   Medication Sig    fluorouracil 5 % soln     lovastatin (MEVACOR) 40 mg tablet TAKE ONE TABLET BY MOUTH ONE TIME DAILY     ferrous sulfate ER (IRON) 160 mg (50 mg iron) TbER tablet Take 1 Tab by mouth daily.     potassium chloride SR (K-TAB) 20 mEq tablet TAKE ONE TABLET BY MOUTH THREE TIMES DAILY  (Patient taking differently: twice daily)    amLODIPine (NORVASC) 5 mg tablet TAKE ONE TABLET BY MOUTH ONE TIME DAILY     rivaroxaban (XARELTO) 15 mg tab tablet Take 1 Tab by mouth daily (with dinner). (Patient taking differently: Take 15 mg by mouth daily (with breakfast). )    cholecalciferol, vitamin D3, (VITAMIN D3) 2,000 unit tab Take 2,000 Units by mouth daily. Indications: PREVENTION OF VITAMIN D DEFICIENCY    triamterene-hydrochlorothiazide (MAXZIDE) 37.5-25 mg per tablet Take  by mouth daily. No current facility-administered medications for this visit. Vitals:    11/22/17 1111 11/22/17 1122   BP: 144/86 140/90   Pulse: (!) 58    Resp: 16    SpO2: 93%    Weight: 210 lb 9.6 oz (95.5 kg)    Height: 5' 8\" (1.727 m)      Social History     Social History    Marital status:      Spouse name: N/A    Number of children: N/A    Years of education: N/A     Occupational History    Not on file. Social History Main Topics    Smoking status: Never Smoker    Smokeless tobacco: Former User     Quit date: 3/30/2013    Alcohol use 0.0 oz/week     0 Standard drinks or equivalent per week      Comment: occasional    Drug use: No    Sexual activity: Not Currently     Other Topics Concern    Not on file     Social History Narrative       I have reviewed the nurses notes, vitals, problem list, allergy list, medical history, family, social history and medications. Review of Symptoms:    General: Pt denies excessive weight gain or loss. Pt is able to conduct ADL's  HEENT: Denies blurred vision, headaches, epistaxis and difficulty swallowing. Respiratory: Denies shortness of breath, DUMONT, wheezing or stridor. Cardiovascular: Denies precordial pain, palpitations, edema or PND  Gastrointestinal: Denies poor appetite, indigestion, abdominal pain or blood in stool  Musculoskeletal: Denies pain or swelling from muscles or joints  Neurologic: Denies tremor, paresthesias, or sensory motor disturbance  Skin: Denies rash, itching or texture change.       Physical Exam:      General: Well developed, in no acute distress, cooperative and alert  HEENT: No carotid bruits, no JVD, trach is midline. Neck Supple, PEERL, EOM intact. Heart:  Normal S1/S2 negative S3 or S4. Regular, no murmur, gallop or rub.   Respiratory: Clear bilaterally x 4, no wheezing or rales  Abdomen:   Soft, non-tender, no masses, bowel sounds are active.   Extremities:  No edema, normal cap refill, no cyanosis, atraumatic. Neuro: A&Ox3, speech clear, gait stable. Skin: Skin color is normal. No rashes or lesions. Non diaphoretic  Vascular: 2+ pulses symmetric in all extremities    Cardiographics    ECG: sinus   Results for orders placed or performed during the hospital encounter of 10/25/17   EKG, 12 LEAD, INITIAL   Result Value Ref Range    Ventricular Rate 69 BPM    Atrial Rate 69 BPM    P-R Interval 170 ms    QRS Duration 88 ms    Q-T Interval 436 ms    QTC Calculation (Bezet) 467 ms    Calculated P Axis 75 degrees    Calculated R Axis 53 degrees    Calculated T Axis -148 degrees    Diagnosis       Normal sinus rhythm  Left ventricular hypertrophy with repolarization abnormality  When compared with ECG of 05-OCT-2017 10:23,  ST more depressed in Inferior leads  ST now depressed in Anterior leads  Confirmed by Dank Garvey ([id number]) on 10/26/2017 3:07:29 PM     Results for orders placed or performed in visit on 11/30/15   CARDIAC HOLTER MONITOR, 24 HOURS    Narrative    ECG Monitor/24 hours, Complete    Reason for Holter Monitor   BRADYCARDIA    Heartbeat    Slowest 37  Average 48  Fastest  75          Results:   Underlying Rhythm: Sinus bradycardia      Atrial Arrhythmias: premature atrial contractions; frequent             AV Conduction: normal    Ventricular Arrhythmias: premature ventricular contractions;  occasional     ST Segment Analysis:non-specific changes     Symptom Correlation:  none    Comment:   Sinus bradycardia, no significant pauses.      Abhilash Stevens MD                   Cardiology Labs:  Lab Results   Component Value Date/Time    Cholesterol, total 177 09/18/2009 09:08 AM    HDL Cholesterol 51 09/18/2009 09:08 AM    LDL, calculated 113 09/18/2009 09:08 AM    Triglyceride 65 09/18/2009 09:08 AM    CHOL/HDL Ratio 3.5 09/18/2009 09:08 AM       Lab Results   Component Value Date/Time    Sodium 146 10/20/2017 12:07 PM    Potassium 3.9 10/20/2017 12:07 PM    Chloride 101 10/20/2017 12:07 PM    CO2 28 10/20/2017 12:07 PM    Anion gap 7 08/26/2016 12:38 AM    Glucose 109 10/20/2017 12:07 PM    BUN 23 10/20/2017 12:07 PM    Creatinine 1.06 10/20/2017 12:07 PM    BUN/Creatinine ratio 22 10/20/2017 12:07 PM    GFR est AA 84 10/20/2017 12:07 PM    GFR est non-AA 72 10/20/2017 12:07 PM    Calcium 9.3 10/20/2017 12:07 PM    Bilirubin, total 0.6 10/20/2017 12:07 PM    AST (SGOT) 18 10/20/2017 12:07 PM    Alk. phosphatase 50 10/20/2017 12:07 PM    Protein, total 6.5 10/20/2017 12:07 PM    Albumin 4.1 10/20/2017 12:07 PM    Globulin 3.5 08/21/2016 04:07 AM    A-G Ratio 1.7 10/20/2017 12:07 PM    ALT (SGPT) 21 10/20/2017 12:07 PM           Assessment:     Assessment:     Diagnoses and all orders for this visit:    1. Paroxysmal atrial fibrillation (HCC)    2. Mixed hyperlipidemia  -     AMB POC EKG ROUTINE W/ 12 LEADS, INTER & REP    3. Hypertrophic cardiomyopathy ([facility]        ICD-10-CM ICD-9-CM    1. Paroxysmal atrial fibrillation (HCC) I48.0 427.31    2. Mixed hyperlipidemia E78.2 272.2 AMB POC EKG ROUTINE W/ 12 LEADS, INTER & REP   3. Hypertrophic cardiomyopathy ([facility] I42.2 425.18      Orders Placed This Encounter    AMB POC EKG ROUTINE W/ 12 LEADS, INTER & REP     Order Specific Question:   Reason for Exam:     Answer:   Routine    fluorouracil 5 % soln        Plan:     1. History of paroxysmal atrial fibrillation is post ablation maintaining sinus rhythm has ILR recent interrogation negative. Continue Xarelto 15 mg daily. 2.  Hyperlipidemia: On statin therapy  3. History of hypertrophic cardiomyopathy: Due for echo, ordered.     Vinayak Govea NP    This note was created using voice recognition software. Despite editing, there may be syntax errors. [facility]    11/22/2017         Agree with note as outlined by  NP. I confirm findings in history and physical exam. No additional findings noted. Agree with plan as outlined above.      Peggy Ulloa MD

## 2017-12-04 ENCOUNTER — OFFICE VISIT (OUTPATIENT)
Dept: CARDIOLOGY CLINIC | Age: 67
End: 2017-12-04

## 2017-12-04 DIAGNOSIS — I48.91 ATRIAL FIBRILLATION, UNSPECIFIED TYPE (HCC): Primary | ICD-10-CM

## 2018-01-04 ENCOUNTER — OFFICE VISIT (OUTPATIENT)
Dept: CARDIOLOGY CLINIC | Age: 68
End: 2018-01-04

## 2018-01-04 DIAGNOSIS — I48.0 PAROXYSMAL ATRIAL FIBRILLATION (HCC): Primary | ICD-10-CM

## 2018-01-19 ENCOUNTER — HOSPITAL ENCOUNTER (OUTPATIENT)
Dept: CT IMAGING | Age: 68
Discharge: HOME | End: 2018-01-19
Attending: SPECIALIST
Payer: MEDICARE

## 2018-01-19 DIAGNOSIS — R91.1 PULMONARY NODULE: ICD-10-CM

## 2018-01-19 PROCEDURE — 71260 CT THORAX DX C+: CPT

## 2018-01-19 PROCEDURE — 71250 CT THORAX DX C-: CPT

## 2018-02-05 ENCOUNTER — CLINICAL SUPPORT (OUTPATIENT)
Dept: CARDIOLOGY CLINIC | Age: 68
End: 2018-02-05

## 2018-02-05 DIAGNOSIS — I48.91 ATRIAL FIBRILLATION, UNSPECIFIED TYPE (HCC): Primary | ICD-10-CM

## 2018-02-06 RX ORDER — AMLODIPINE BESYLATE 5 MG/1
5 TABLET ORAL DAILY
Qty: 90 TAB | Refills: 3 | Status: SHIPPED | OUTPATIENT
Start: 2018-02-06 | End: 2019-02-26 | Stop reason: SDUPTHER

## 2018-02-06 RX ORDER — LOVASTATIN 40 MG/1
40 TABLET ORAL DAILY
Qty: 90 TAB | Refills: 3 | Status: SHIPPED | OUTPATIENT
Start: 2018-02-06 | End: 2019-02-26 | Stop reason: SDUPTHER

## 2018-02-06 NOTE — TELEPHONE ENCOUNTER
Refill request:  Requested Prescriptions     Pending Prescriptions Disp Refills    lovastatin (MEVACOR) 40 mg tablet 90 Tab 0    amLODIPine (NORVASC) 5 mg tablet 90 Tab 1         This was sent due to the fact patient wants all medication to be sent through mail order-     [address]-  [phone]        Thanks,

## 2018-02-20 ENCOUNTER — OFFICE VISIT (OUTPATIENT)
Dept: CARDIOLOGY CLINIC | Age: 68
End: 2018-02-20

## 2018-02-20 VITALS
HEART RATE: 56 BPM | HEIGHT: 68 IN | SYSTOLIC BLOOD PRESSURE: 122 MMHG | DIASTOLIC BLOOD PRESSURE: 78 MMHG | RESPIRATION RATE: 18 BRPM | WEIGHT: 203.4 LBS | OXYGEN SATURATION: 95 % | BODY MASS INDEX: 30.83 KG/M2

## 2018-02-20 DIAGNOSIS — I10 ESSENTIAL HYPERTENSION: ICD-10-CM

## 2018-02-20 DIAGNOSIS — I42.1 HOCM (HYPERTROPHIC OBSTRUCTIVE CARDIOMYOPATHY) (HCC): ICD-10-CM

## 2018-02-20 DIAGNOSIS — I49.9 IRREGULAR HEART BEAT: Primary | ICD-10-CM

## 2018-02-20 DIAGNOSIS — I48.0 PAROXYSMAL ATRIAL FIBRILLATION (HCC): ICD-10-CM

## 2018-02-20 RX ORDER — LEVOTHYROXINE SODIUM 50 UG/1
TABLET ORAL
COMMUNITY

## 2018-02-20 NOTE — PROGRESS NOTES
Subjective:      Nik Delatorre is a 76 y.o. male is here for f/u of his AF. The patient denies chest pain/ shortness of breath, orthopnea, PND, LE edema, palpitations, syncope, presyncope or fatigue.        Patient Active Problem List    Diagnosis Date Noted    A-fib [facility]) 10/25/2017    Irregular heart beat 10/12/2017    Sepsis ([address] 08/20/2016    SSS (sick sinus syndrome) ([address] 11/30/2015    Hypertrophic cardiomyopathy ([address] 12/03/2014    Atrial fibrillation ([address] 11/12/2014    HTN (hypertension) 11/12/2014    Mixed hyperlipidemia 11/12/2014      Jimbo Ortega MD  Past Medical History:   Diagnosis Date    Arrhythmia     a fib    Cancer ([address]     skin, throat cancer    G tube feedings ([address]     PEG    High cholesterol     Hypertension     [facility]     per cardiology notes 6/2013    Status post chemoradiation     completed 12/2013    Throat cancer [facility])       Past Surgical History:   Procedure Laterality Date    COLONOSCOPY N/A 1/17/2017    COLONOSCOPY performed by Eduard Pagan [facility] at [facility] ENDOSCOPY    HX HEART CATHETERIZATION  2007    HX ORTHOPAEDIC      Rt. hand surgery    HX OTHER SURGICAL      exc. skin cancer    HX OTHER SURGICAL      peg tube inserted    HX TONSILLECTOMY      IA EGD BALLOON DILATION ESOPHAGUS <30 MM DIAM  11/8/2013          No Known Allergies   Family History   Problem Relation Age of Onset    Heart Attack Mother     Cancer Father     negative for cardiac disease  Social History     Social History    Marital status:      Spouse name: N/A    Number of children: N/A    Years of education: N/A     Social History Main Topics    Smoking status: Never Smoker    Smokeless tobacco: Former User     Quit date: 3/30/2013    Alcohol use 0.0 oz/week     0 Standard drinks or equivalent per week      Comment: occasional    Drug use: No    Sexual activity: Not Currently     Other Topics Concern    None     Social History Narrative     Current Outpatient Prescriptions   Medication Sig    levothyroxine (SYNTHROID) 50 mcg tablet Take  by mouth Daily (before breakfast).  lovastatin (MEVACOR) 40 mg tablet Take 1 Tab by mouth daily.  amLODIPine (NORVASC) 5 mg tablet Take 1 Tab by mouth daily.  potassium chloride SR (K-TAB) 20 mEq tablet Take 1 Tab by mouth two (2) times a day.  ferrous sulfate ER (IRON) 160 mg (50 mg iron) TbER tablet Take 1 Tab by mouth daily.  rivaroxaban (XARELTO) 15 mg tab tablet Take 1 Tab by mouth daily (with dinner). (Patient taking differently: Take 15 mg by mouth daily (with breakfast). )    cholecalciferol, vitamin D3, (VITAMIN D3) 2,000 unit tab Take 2,000 Units by mouth daily. Indications: PREVENTION OF VITAMIN D DEFICIENCY    triamterene-hydrochlorothiazide (MAXZIDE) 37.5-25 mg per tablet Take  by mouth daily.  fluorouracil 5 % soln      No current facility-administered medications for this visit. Vitals:    02/20/18 1115   BP: 122/78   Pulse: (!) 56   Resp: 18   SpO2: 95%   Weight: 203 lb 6.4 oz (92.3 kg)   Height: 5' 8\" (1.727 m)       I have reviewed the nurses notes, vitals, problem list, allergy list, medical history, family, social history and medications. Review of Symptoms:    General: Pt denies excessive weight gain or loss. Pt is able to conduct ADL's  HEENT: Denies blurred vision, headaches, epistaxis and difficulty swallowing. Respiratory: Denies shortness of breath, DUMONT, wheezing or stridor. Cardiovascular: Denies precordial pain, palpitations, edema or PND  Gastrointestinal: Denies poor appetite, indigestion, abdominal pain or blood in stool  Urinary: Denies dysuria, pyuria  Musculoskeletal: Denies pain or swelling from muscles or joints  Neurologic: Denies tremor, paresthesias, or sensory motor disturbance  Skin: Denies rash, itching or texture change. Psych: Denies depression      Physical Exam:      General: Well developed, in no acute distress.   HEENT: Eyes - PERRL, no jvd  Heart:  Normal S1/S2 negative S3 or S4. Regular, no murmur, gallop or rub.   Respiratory: Clear bilaterally x 4, no wheezing or rales  Abdomen:   Soft, non-tender, bowel sounds are active.   Extremities:  No edema, normal cap refill, no cyanosis. Musculoskeletal: No clubbing  Neuro: A&Ox3, speech clear, gait stable. Skin: Skin color is normal. No rashes or lesions. Non diaphoretic  Vascular: 2+ pulses symmetric in all extremities    Cardiographics    Ekg: nsr    ilr - wnl    Results for orders placed or performed during the hospital encounter of 10/25/17   EKG, 12 LEAD, INITIAL   Result Value Ref Range    Ventricular Rate 69 BPM    Atrial Rate 69 BPM    P-R Interval 170 ms    QRS Duration 88 ms    Q-T Interval 436 ms    QTC Calculation (Bezet) 467 ms    Calculated P Axis 75 degrees    Calculated R Axis 53 degrees    Calculated T Axis -148 degrees    Diagnosis       Normal sinus rhythm  Left ventricular hypertrophy with repolarization abnormality  When compared with ECG of 05-OCT-2017 10:23,  ST more depressed in Inferior leads  ST now depressed in Anterior leads  Confirmed by Rianna Mandel ([id number]) on 10/26/2017 3:07:29 PM     Results for orders placed or performed in visit on 11/30/15   CARDIAC HOLTER MONITOR, 24 HOURS    Narrative    ECG Monitor/24 hours, Complete    Reason for Holter Monitor   BRADYCARDIA    Heartbeat    Slowest 37  Average 48  Fastest  75          Results:   Underlying Rhythm: Sinus bradycardia      Atrial Arrhythmias: premature atrial contractions; frequent             AV Conduction: normal    Ventricular Arrhythmias: premature ventricular contractions;  occasional     ST Segment Analysis:non-specific changes     Symptom Correlation:  none    Comment:   Sinus bradycardia, no significant pauses.      Giovanny Loyola MD                   Lab Results   Component Value Date/Time    WBC 5.0 10/20/2017 12:07 PM    HGB 14.6 10/20/2017 12:07 PM    HCT 42.0 10/20/2017 12:07 PM    PLATELET 195 50/94/7072 12:07 PM    MCV 92 10/20/2017 12:07 PM      Lab Results   Component Value Date/Time    Sodium 146 (H) 10/20/2017 12:07 PM    Potassium 3.9 10/20/2017 12:07 PM    Chloride 101 10/20/2017 12:07 PM    CO2 28 10/20/2017 12:07 PM    Anion gap 7 08/26/2016 12:38 AM    Glucose 109 (H) 10/20/2017 12:07 PM    BUN 23 10/20/2017 12:07 PM    Creatinine 1.06 10/20/2017 12:07 PM    BUN/Creatinine ratio 22 10/20/2017 12:07 PM    GFR est AA 84 10/20/2017 12:07 PM    GFR est non-AA 72 10/20/2017 12:07 PM    Calcium 9.3 10/20/2017 12:07 PM    Bilirubin, total 0.6 10/20/2017 12:07 PM    AST (SGOT) 18 10/20/2017 12:07 PM    Alk. phosphatase 50 10/20/2017 12:07 PM    Protein, total 6.5 10/20/2017 12:07 PM    Albumin 4.1 10/20/2017 12:07 PM    Globulin 3.5 08/21/2016 04:07 AM    A-G Ratio 1.7 10/20/2017 12:07 PM    ALT (SGPT) 21 10/20/2017 12:07 PM         Assessment:     Assessment:        ICD-10-CM ICD-9-CM    1. Irregular heart beat I49.9 427.9 AMB POC EKG ROUTINE W/ 12 LEADS, INTER & REP   2. HOCM (hypertrophic obstructive cardiomyopathy) (HCC) I42.1 425.11    3. Paroxysmal atrial fibrillation (HCC) I48.0 427.31    4. Essential hypertension I10 401.9      Orders Placed This Encounter    AMB POC EKG ROUTINE W/ 12 LEADS, INTER & REP     Order Specific Question:   Reason for Exam:     Answer:   routine    levothyroxine (SYNTHROID) 50 mcg tablet     Sig: Take  by mouth Daily (before breakfast). Plan:   Mr Joseph Lozano is in sinus and asymptomatic. He is on xarelto (subtherapeutic) for his chadsvasc of 2 (age and htn). I explained to him the risks of cva associated with a subtherapeutic dose. He will f/u with Dr Anu Garza for his hocm and with me in 6 months. Continue medical management for htn, aF and HOCM. Thank you for allowing me to participate in Riky Burgos 's care.     Starr Grigsby MD, Dallin Jackson

## 2018-02-20 NOTE — MR AVS SNAPSHOT
[address] [facility] 
[phone] Patient: Marilu Ingram. MRN: [id number] EGK:9/3/0091 Visit Information Date & Time Provider Department Dept. Phone Encounter #  
 2/20/2018 10:45 AM Deonte Barrett, [address] [facility] [phone] [id number] Your Appointments 3/8/2018  8:00 AM  
PROCEDURE with [facility] [address] [place] [address]) Appt Note: NOT A OFFICE VISIT  REMOTE MDT ILR  
 [id number] [address] [facility]  
[phone] [address]  
  
    
 6/5/2018  9:15 AM  
6 MONTH with Nilda Echols MD  
[facility] [address] [place] [address]) Appt Note: Dr. Kayla Marques [facility]  
[phone] [address] [address]  
  
    
 9/4/2018  9:45 AM  
ESTABLISHED PATIENT with Andie Reyna MD  
[facility] [address] [place] [address]) [address]  
[phone] [address] Upcoming Health Maintenance Date Due Hepatitis C Screening 1950 DTaP/Tdap/Td series (1 - Tdap) 1/4/1971 FOBT Q 1 YEAR AGE 50-75 1/4/2000 ZOSTER VACCINE AGE 60> 11/4/2009 GLAUCOMA SCREENING Q2Y 1/4/2015 Pneumococcal 65+ Low/Medium Risk (1 of 2 - PCV13) 1/4/2015 MEDICARE YEARLY EXAM 1/4/2015 Influenza Age 5 to Adult 8/1/2017 Allergies as of 2/20/2018  Review Complete On: 2/20/2018 By: Andie Reyna MD  
 No Known Allergies Current Immunizations  Never Reviewed No immunizations on file. Not reviewed this visit You Were Diagnosed With   
  
 Codes Comments Irregular heart beat    -  Primary ICD-10-CM: I49.9 ICD-9-CM: 427.9 H[facility]M [facility]hypertrophic obstructive cardiomyopathy) [facility]     ICD-10-CM: I42.1 ICD-9-CM: 425.11 Paroxysmal atrial fibrillation (HCC)     ICD-10-CM: I48.0 ICD-9-CM: 427.31 Essential hypertension     ICD-10-CM: I10 
ICD-9-CM: 401.9 Vitals BP Pulse Resp Height(growth percentile) Weight(growth percentile) SpO2  
 122/78 (BP 1 Location: Right arm, BP Patient Position: Sitting) (!) 56 18 5' 8\" (1.727 m) 203 lb 6.4 oz (92.3 kg) 95% BMI Smoking Status 30.93 kg/m2 Never Smoker BMI and BSA Data Body Mass Index Body Surface Area 30.93 kg/m 2 2.1 m 2 Preferred Pharmacy Pharmacy Name Phone  [address] [phone] Your Updated Medication List  
  
   
This list is accurate as of: 2/20/18 11:32 AM.  Always use your most recent med list. amLODIPine 5 mg tablet Commonly known as:  Lynnell Manual Take 1 Tab by mouth daily. fluorouracil 5 % Soln Iron 160 mg (50 mg iron) Tber tablet Generic drug:  ferrous sulfate ER Take 1 Tab by mouth daily. levothyroxine 50 mcg tablet Commonly known as:  SYNTHROID Take  by mouth Daily (before breakfast). lovastatin 40 mg tablet Commonly known as:  MEVACOR Take 1 Tab by mouth daily. potassium chloride SR 20 mEq tablet Commonly known as:  K-TAB Take 1 Tab by mouth two (2) times a day. rivaroxaban 15 mg Tab tablet Commonly known as:  Banda Cutting Take 1 Tab by mouth daily (with dinner). triamterene-hydroCHLOROthiazide 37.5-25 mg per tablet Commonly known as:  Tommy Cutter Take  by mouth daily. VITAMIN D3 2,000 unit Tab Generic drug:  cholecalciferol (vitamin D3) Take 2,000 Units by mouth daily. Indications: PREVENTION OF VITAMIN D DEFICIENCY We Performed the Following AMB POC EKG ROUTINE W/ 12 LEADS, INTER & REP [75724 CPT(R)] Introducing [facility] & HEALTH SERVICES! Dear Tanya Zhang: Thank you for requesting a MyChart account.   Our records indicate that you already have an active Virool account. You can access your account anytime at [email or web address] Did you know that you can access your hospital and ER discharge instructions at any time in Virool? You can also review all of your test results from your hospital stay or ER visit. Additional Information If you have questions, please visit the Frequently Asked Questions section of the Virool website at [email or web address] Remember, Virool is NOT to be used for urgent needs. For medical emergencies, dial 911. Now available from your iPhone and Android! Please provide this summary of care documentation to your next provider. Your primary care clinician is listed as Brina Hernandez. If you have any questions after today's visit, please call [phone].

## 2018-02-20 NOTE — PROGRESS NOTES
1. Have you been to the ER, urgent care clinic since your last visit? Hospitalized since your last visit? No    2. Have you seen or consulted any other health care providers outside of the [address] [place] [address] since your last visit? Include any pap smears or colon screening. No     Chief Complaint   Patient presents with    Irregular Heart Beat     3 mo appt. Denied cardiac symptoms.

## 2018-03-08 ENCOUNTER — CLINICAL SUPPORT (OUTPATIENT)
Dept: CARDIOLOGY CLINIC | Age: 68
End: 2018-03-08

## 2018-03-08 DIAGNOSIS — I48.91 ATRIAL FIBRILLATION, UNSPECIFIED TYPE (HCC): Primary | ICD-10-CM

## 2018-04-09 ENCOUNTER — CLINICAL SUPPORT (OUTPATIENT)
Dept: CARDIOLOGY CLINIC | Age: 68
End: 2018-04-09

## 2018-04-09 DIAGNOSIS — I48.91 ATRIAL FIBRILLATION, UNSPECIFIED TYPE (HCC): Primary | ICD-10-CM

## 2018-05-11 ENCOUNTER — OFFICE VISIT (OUTPATIENT)
Dept: CARDIOLOGY CLINIC | Age: 68
End: 2018-05-11

## 2018-05-11 DIAGNOSIS — I49.5 SSS (SICK SINUS SYNDROME) (HCC): ICD-10-CM

## 2018-05-11 DIAGNOSIS — Z45.09 ENCOUNTER FOR LOOP RECORDER CHECK: ICD-10-CM

## 2018-05-11 DIAGNOSIS — I48.0 PAROXYSMAL ATRIAL FIBRILLATION (HCC): Primary | ICD-10-CM

## 2018-06-05 ENCOUNTER — OFFICE VISIT (OUTPATIENT)
Dept: CARDIOLOGY CLINIC | Age: 68
End: 2018-06-05

## 2018-06-05 VITALS
SYSTOLIC BLOOD PRESSURE: 132 MMHG | HEART RATE: 68 BPM | WEIGHT: 198.9 LBS | HEIGHT: 68 IN | RESPIRATION RATE: 18 BRPM | OXYGEN SATURATION: 97 % | DIASTOLIC BLOOD PRESSURE: 80 MMHG | BODY MASS INDEX: 30.14 KG/M2

## 2018-06-05 DIAGNOSIS — E78.2 MIXED HYPERLIPIDEMIA: ICD-10-CM

## 2018-06-05 DIAGNOSIS — I42.2 HYPERTROPHIC CARDIOMYOPATHY (HCC): ICD-10-CM

## 2018-06-05 DIAGNOSIS — I10 ESSENTIAL HYPERTENSION: ICD-10-CM

## 2018-06-05 DIAGNOSIS — I48.0 PAF (PAROXYSMAL ATRIAL FIBRILLATION) (HCC): Primary | ICD-10-CM

## 2018-06-05 NOTE — MR AVS SNAPSHOT
[address] [facility] 
[phone] Patient: Norbert Dunn. MRN: [id number] IJR:0/6/2692 Visit Information Date & Time Provider Department Dept. Phone Encounter #  
 6/5/2018  9:15 AM Ken Matthews MD [facility] [phone] [id number] Your Appointments 6/11/2018  8:00 AM  
PROCEDURE with [facility] Appt Note: NOT AN OFFICE VISIT - REMOTE MDT [address] [facility]  
[phone] [address] [facility]  
  
    
 6/14/2018  9:00 AM  
ECHO CARDIOGRAMS 2D with ECHO, [facility] Appt Note: Dr Pete Leon (TTE) W OR WO CONTR [facility] (Order [id number]) ,[address] [facility]  
[phone] [address] [facility] [address] [address]  
  
    
 9/4/2018  9:45 AM  
ESTABLISHED PATIENT with Sarah Garza MD  
[facility]) [address] [facility]  
[phone] [address] [facility] Upcoming Health Maintenance Date Due Hepatitis C Screening 1950 DTaP/Tdap/Td series (1 - Tdap) 1/4/1971 FOBT Q 1 YEAR AGE 50-75 1/4/2000 ZOSTER VACCINE AGE 60> 11/4/2009 GLAUCOMA SCREENING Q2Y 1/4/2015 Pneumococcal 65+ Low/Medium Risk (1 of 2 - PCV13) 1/4/2015 MEDICARE YEARLY EXAM 3/14/2018 Influenza Age 5 to Adult 8/1/2018 Allergies as of 6/5/2018  Review Complete On: 6/5/2018 By: Frances Ortega LPN No Known Allergies Current Immunizations  Never Reviewed No immunizations on file. Not reviewed this visit You Were Diagnosed With   
  
 Codes Comments PAF (paroxysmal atrial fibrillation) ([facility]    -  Primary ICD-10-CM: I48.0 ICD-9-CM: 427.31 Essential hypertension     ICD-10-CM: I10 
ICD-9-CM: 401.9 Hypertrophic cardiomyopathy (HCC)     ICD-10-CM: I42.2 ICD-9-CM: 425.18 Mixed hyperlipidemia     ICD-10-CM: E78.2 ICD-9-CM: 272.2 Vitals BP Pulse Resp Height(growth percentile) Weight(growth percentile) SpO2  
 132/80 (BP 1 Location: Right arm, BP Patient Position: Sitting) 68 18 5' 8\" (1.727 m) 198 lb 14.4 oz (90.2 kg) 97% BMI Smoking Status 30.24 kg/m2 Never Smoker Vitals History BMI and BSA Data Body Mass Index Body Surface Area  
 30.24 kg/m 2 2.08 m 2 Preferred Pharmacy Pharmacy Name Phone  [address] [phone] Your Updated Medication List  
  
   
This list is accurate as of 6/5/18 10:21 AM.  Always use your most recent med list. amLODIPine 5 mg tablet Commonly known as:  Iuka Bars Take 1 Tab by mouth daily. Iron 160 mg (50 mg iron) Tber tablet Generic drug:  ferrous sulfate ER Take 1 Tab by mouth daily. levothyroxine 50 mcg tablet Commonly known as:  SYNTHROID Take  by mouth Daily (before breakfast). lovastatin 40 mg tablet Commonly known as:  MEVACOR Take 1 Tab by mouth daily. potassium chloride SR 20 mEq tablet Commonly known as:  K-TAB Take 1 Tab by mouth two (2) times a day. rivaroxaban 15 mg Tab tablet Commonly known as:  Davene Capo Take 1 Tab by mouth daily (with dinner). triamterene-hydroCHLOROthiazide 37.5-25 mg per tablet Commonly known as:  Eulice Ambar Take  by mouth daily. VITAMIN D3 2,000 unit Tab Generic drug:  cholecalciferol (vitamin D3) Take 2,000 Units by mouth daily. Indications: PREVENTION OF VITAMIN D DEFICIENCY We Performed the Following 2D ECHO COMPLETE ADULT (TTE) W OR WO CONTR [42797 CPT(R)] AMB POC EKG ROUTINE W/ 12 LEADS, INTER & REP [04267 CPT(R)] Introducing [facility] & HEALTH SERVICES! Dear No Hamilton: Thank you for requesting a Umami account. Our records indicate that you already have an active Umami account. You can access your account anytime at [email or web address] Did you know that you can access your hospital and ER discharge instructions at any time in Umami? You can also review all of your test results from your hospital stay or ER visit. Additional Information If you have questions, please visit the Frequently Asked Questions section of the Umami website at [email or web address] Remember, Umami is NOT to be used for urgent needs. For medical emergencies, dial 911. Now available from your iPhone and Android! Please provide this summary of care documentation to your next provider. Your primary care clinician is listed as Carson Cleveland. If you have any questions after today's visit, please call [phone].

## 2018-06-05 NOTE — PROGRESS NOTES
[address] [address]  [phone]     Subjective:      Jackie Foote is a 76 y.o. male is here for routine f/u. The patient denies chest pain/ shortness of breath, orthopnea, PND, LE edema, palpitations, syncope, or presyncope. Patient Active Problem List    Diagnosis Date Noted    A-fib [facility]) 10/25/2017    Irregular heart beat 10/12/2017    Sepsis ([address] 08/20/2016    SSS (sick sinus syndrome) ([address] 11/30/2015    Hypertrophic cardiomyopathy ([address] 12/03/2014    Atrial fibrillation ([address] 11/12/2014    HTN (hypertension) 11/12/2014    Mixed hyperlipidemia 11/12/2014      Aixa Iyer MD  Past Medical History:   Diagnosis Date    Arrhythmia     a fib    Cancer ([address]     skin, throat cancer    G tube feedings ([address]     PEG    High cholesterol     Hypertension     Hypertrophic cardiomyopathy ([address]     per cardiology notes 6/2013    Status post chemoradiation     completed 12/2013    Throat cancer [facility])       Past Surgical History:   Procedure Laterality Date    COLONOSCOPY N/A 1/17/2017    COLONOSCOPY performed by Neil Jackson MD at [facility] ENDOSCOPY    HX HEART CATHETERIZATION  2007    HX ORTHOPAEDIC      Rt. hand surgery    HX OTHER SURGICAL      exc. skin cancer    HX OTHER SURGICAL      peg tube inserted    HX TONSILLECTOMY      [place] EGD BALLOON DILATION ESOPHAGUS <30 MM DIAM  11/8/2013          No Known Allergies   Family History   Problem Relation Age of Onset    Heart Attack Mother     Cancer Father       Social History     Social History    Marital status:      Spouse name: N/A    Number of children: N/A    Years of education: N/A     Occupational History    Not on file.      Social History Main Topics    Smoking status: Never Smoker    Smokeless tobacco: Former User     Quit date: 3/30/2013    Alcohol use 0.0 oz/week     0 Standard drinks or equivalent per week      Comment: occasional    Drug use: No    Sexual activity: Not Currently     Other Topics Concern    Not on file     Social History Narrative      Current Outpatient Prescriptions   Medication Sig    levothyroxine (SYNTHROID) 50 mcg tablet Take  by mouth Daily (before breakfast).  lovastatin (MEVACOR) 40 mg tablet Take 1 Tab by mouth daily.  amLODIPine (NORVASC) 5 mg tablet Take 1 Tab by mouth daily.  potassium chloride SR (K-TAB) 20 mEq tablet Take 1 Tab by mouth two (2) times a day.  ferrous sulfate ER (IRON) 160 mg (50 mg iron) TbER tablet Take 1 Tab by mouth daily.  rivaroxaban (XARELTO) 15 mg tab tablet Take 1 Tab by mouth daily (with dinner). (Patient taking differently: Take 15 mg by mouth daily (with breakfast). PT TAKING 3 DAYS THEN SKIPPING A DAY)    cholecalciferol, vitamin D3, (VITAMIN D3) 2,000 unit tab Take 2,000 Units by mouth daily. Indications: PREVENTION OF VITAMIN D DEFICIENCY    triamterene-hydrochlorothiazide (MAXZIDE) 37.5-25 mg per tablet Take  by mouth daily.  fluorouracil 5 % soln      No current facility-administered medications for this visit. Review of Symptoms:  11 systems reviewed, negative other than as stated in the HPI    Physical ExamPhysical Exam:    Vitals:    06/05/18 0929 06/05/18 0937   BP: 130/80 132/80   Pulse: 68    Resp: 18    SpO2: 97%    Weight: 198 lb 14.4 oz (90.2 kg)    Height: 5' 8\" (1.727 m)      Body mass index is 30.24 kg/(m^2). General PE   Gen:  NAD  Mental Status - Alert. General Appearance - Not in acute distress. Chest and Lung Exam   Inspection: Accessory muscles - No use of accessory muscles in breathing. Auscultation:   Breath sounds: - Normal.   Cardiovascular   Inspection: Jugular vein - Bilateral - Inspection Normal.   Palpation/Percussion:   Apical Impulse: - Normal.   Auscultation: Rhythm - Regular. Heart Sounds - S1 WNL and S2 WNL. No S3 or S4. Murmurs & Other Heart Sounds: Auscultation of the heart reveals - No Murmurs.    Peripheral Vascular   Upper Extremity: Inspection - Bilateral - No Cyanotic nailbeds or Digital clubbing. Lower Extremity:   Palpation: Edema - Bilateral - No edema. Abdomen:   Soft, non-tender, bowel sounds are active. Neuro: A&O times 3, CN and motor grossly WNL    Labs:   Lab Results   Component Value Date/Time    Cholesterol, total 177 09/18/2009 09:08 AM    Cholesterol, total 224 (H) 05/18/2009 08:41 AM    HDL Cholesterol 51 09/18/2009 09:08 AM    HDL Cholesterol 61 (H) 05/18/2009 08:41 AM    LDL, calculated 113 (H) 09/18/2009 09:08 AM    LDL, calculated 152.4 (H) 05/18/2009 08:41 AM    Triglyceride 65 09/18/2009 09:08 AM    Triglyceride 53 05/18/2009 08:41 AM    CHOL/HDL Ratio 3.5 09/18/2009 09:08 AM    CHOL/HDL Ratio 3.7 05/18/2009 08:41 AM     Lab Results   Component Value Date/Time    CK 84 08/22/2016 04:06 AM     Lab Results   Component Value Date/Time    Sodium 146 (H) 10/20/2017 12:07 PM    Potassium 3.9 10/20/2017 12:07 PM    Chloride 101 10/20/2017 12:07 PM    CO2 28 10/20/2017 12:07 PM    Anion gap 7 08/26/2016 12:38 AM    Glucose 109 (H) 10/20/2017 12:07 PM    BUN 23 10/20/2017 12:07 PM    Creatinine 1.06 10/20/2017 12:07 PM    BUN/Creatinine ratio 22 10/20/2017 12:07 PM    GFR est AA 84 10/20/2017 12:07 PM    GFR est non-AA 72 10/20/2017 12:07 PM    Calcium 9.3 10/20/2017 12:07 PM    Bilirubin, total 0.6 10/20/2017 12:07 PM    AST (SGOT) 18 10/20/2017 12:07 PM    Alk. phosphatase 50 10/20/2017 12:07 PM    Protein, total 6.5 10/20/2017 12:07 PM    Albumin 4.1 10/20/2017 12:07 PM    Globulin 3.5 08/21/2016 04:07 AM    A-G Ratio 1.7 10/20/2017 12:07 PM    ALT (SGPT) 21 10/20/2017 12:07 PM       EKG:  NSR sinus rythm     Assessment:     Assessment:      1. PAF (paroxysmal atrial fibrillation) [facility]    2. Essential hypertension    3. Hypertrophic cardiomyopathy ([facility]    4.  Mixed hyperlipidemia        Orders Placed This Encounter    AMB POC EKG ROUTINE W/ 12 LEADS, INTER & REP     Order Specific Question:   Reason for Exam:     Answer: ROUTINE        Plan:     Pt presents for f/u. Hx normal nuclear exercise stress test in 1/15. Normal EF, grade 1 diastolic dysfxn, mild MR/TR per echo in 01/15. History of paroxysmal atrial fibrillation, post ablation   Maintaining sinus rhythm. 5/30 ILR recent interrogation negative. [id number] score: 2. Taking  Xarelto 15 mg every 3 days then skips a day, wants to eventually come off of [facility]    Hyperlipidemia  1/18 LDL at 67. On statin therapy    HTN  Controlled with current therapy    History of hypertrophic cardiomyopathy  Stable. Due for echo, ordered.     Doing well with no cardiac symptoms. Continue current care and f/u in 6 months. Lalo Burris NP       [facility]    6/5/2018         Patient seen, examined by me personally. Plan discussed as detailed. Agree with note as outlined by  NP. I confirm findings in history and physical exam. No additional findings noted. Agree with plan as outlined above. Discussed risks/benefits of xarelto. He wants to come off xarelto. Will start ASA.     Marylee Franklin, MD

## 2018-06-05 NOTE — PROGRESS NOTES
1. Have you been to the ER, urgent care clinic since your last visit? Hospitalized since your last visit? NO    2. Have you seen or consulted any other health care providers outside of the [facility] since your last visit? Include any pap smears or colon screening. YES PCP.     6 MONTH FOLLOW UP. NO CARDIAC C/O.

## 2018-06-11 ENCOUNTER — CLINICAL SUPPORT (OUTPATIENT)
Dept: CARDIOLOGY CLINIC | Age: 68
End: 2018-06-11

## 2018-06-11 DIAGNOSIS — Z45.09 ENCOUNTER FOR LOOP RECORDER CHECK: ICD-10-CM

## 2018-06-11 DIAGNOSIS — I49.5 SSS (SICK SINUS SYNDROME) (HCC): ICD-10-CM

## 2018-06-11 DIAGNOSIS — I48.91 ATRIAL FIBRILLATION, UNSPECIFIED TYPE (HCC): Primary | ICD-10-CM

## 2018-06-14 ENCOUNTER — HOSPITAL ENCOUNTER (EMERGENCY)
Age: 68
Discharge: HOME | End: 2018-06-14
Attending: EMERGENCY MEDICINE | Admitting: EMERGENCY MEDICINE
Payer: MEDICARE

## 2018-06-14 ENCOUNTER — CLINICAL SUPPORT (OUTPATIENT)
Dept: CARDIOLOGY CLINIC | Age: 68
End: 2018-06-14

## 2018-06-14 ENCOUNTER — APPOINTMENT (OUTPATIENT)
Dept: CT IMAGING | Age: 68
End: 2018-06-14
Attending: EMERGENCY MEDICINE
Payer: MEDICARE

## 2018-06-14 ENCOUNTER — DOCUMENTATION ONLY (OUTPATIENT)
Dept: CARDIOLOGY CLINIC | Age: 68
End: 2018-06-14

## 2018-06-14 VITALS
SYSTOLIC BLOOD PRESSURE: 153 MMHG | BODY MASS INDEX: 29.55 KG/M2 | RESPIRATION RATE: 18 BRPM | WEIGHT: 195 LBS | DIASTOLIC BLOOD PRESSURE: 89 MMHG | OXYGEN SATURATION: 96 % | HEIGHT: 68 IN | TEMPERATURE: 98 F | HEART RATE: 58 BPM

## 2018-06-14 DIAGNOSIS — I42.2 HYPERTROPHIC CARDIOMYOPATHY (HCC): Primary | ICD-10-CM

## 2018-06-14 DIAGNOSIS — I10 ESSENTIAL HYPERTENSION: ICD-10-CM

## 2018-06-14 DIAGNOSIS — I48.0 PAROXYSMAL ATRIAL FIBRILLATION (HCC): ICD-10-CM

## 2018-06-14 DIAGNOSIS — R93.1 ABNORMAL ECHOCARDIOGRAM: Primary | ICD-10-CM

## 2018-06-14 LAB
ALBUMIN SERPL-MCNC: 3.7 G/DL (ref 3.5–5)
ALBUMIN/GLOB SERPL: 1.2 {RATIO} (ref 1.1–2.2)
ALP SERPL-CCNC: 52 U/L (ref 45–117)
ALT SERPL-CCNC: 26 U/L (ref 12–78)
ANION GAP SERPL CALC-SCNC: 6 MMOL/L (ref 5–15)
AST SERPL-CCNC: 15 U/L (ref 15–37)
BASOPHILS # BLD: 0 K/UL (ref 0–0.1)
BASOPHILS NFR BLD: 0 % (ref 0–1)
BILIRUB SERPL-MCNC: 0.5 MG/DL (ref 0.2–1)
BUN SERPL-MCNC: 26 MG/DL (ref 6–20)
BUN/CREAT SERPL: 21 (ref 12–20)
CALCIUM SERPL-MCNC: 9.1 MG/DL (ref 8.5–10.1)
CHLORIDE SERPL-SCNC: 102 MMOL/L (ref 97–108)
CO2 SERPL-SCNC: 33 MMOL/L (ref 21–32)
CREAT SERPL-MCNC: 1.21 MG/DL (ref 0.7–1.3)
DIFFERENTIAL METHOD BLD: NORMAL
EOSINOPHIL # BLD: 0.1 K/UL (ref 0–0.4)
EOSINOPHIL NFR BLD: 2 % (ref 0–7)
ERYTHROCYTE [DISTWIDTH] IN BLOOD BY AUTOMATED COUNT: 12.4 % (ref 11.5–14.5)
GLOBULIN SER CALC-MCNC: 3.1 G/DL (ref 2–4)
GLUCOSE SERPL-MCNC: 100 MG/DL (ref 65–100)
HCT VFR BLD AUTO: 41.5 % (ref 36.6–50.3)
HGB BLD-MCNC: 14.6 G/DL (ref 12.1–17)
IMM GRANULOCYTES # BLD: 0 K/UL (ref 0–0.04)
IMM GRANULOCYTES NFR BLD AUTO: 0 % (ref 0–0.5)
LYMPHOCYTES # BLD: 1.1 K/UL (ref 0.8–3.5)
LYMPHOCYTES NFR BLD: 23 % (ref 12–49)
MCH RBC QN AUTO: 32.6 PG (ref 26–34)
MCHC RBC AUTO-ENTMCNC: 35.2 G/DL (ref 30–36.5)
MCV RBC AUTO: 92.6 FL (ref 80–99)
MONOCYTES # BLD: 0.5 K/UL (ref 0–1)
MONOCYTES NFR BLD: 12 % (ref 5–13)
NEUTS SEG # BLD: 2.9 K/UL (ref 1.8–8)
NEUTS SEG NFR BLD: 62 % (ref 32–75)
NRBC # BLD: 0 K/UL (ref 0–0.01)
NRBC BLD-RTO: 0 PER 100 WBC
PLATELET # BLD AUTO: 208 K/UL (ref 150–400)
PMV BLD AUTO: 10.5 FL (ref 8.9–12.9)
POTASSIUM SERPL-SCNC: 3.4 MMOL/L (ref 3.5–5.1)
PROT SERPL-MCNC: 6.8 G/DL (ref 6.4–8.2)
RBC # BLD AUTO: 4.48 M/UL (ref 4.1–5.7)
SODIUM SERPL-SCNC: 141 MMOL/L (ref 136–145)
WBC # BLD AUTO: 4.6 K/UL (ref 4.1–11.1)

## 2018-06-14 PROCEDURE — 99282 EMERGENCY DEPT VISIT SF MDM: CPT

## 2018-06-14 PROCEDURE — 85025 COMPLETE CBC W/AUTO DIFF WBC: CPT | Performed by: EMERGENCY MEDICINE

## 2018-06-14 PROCEDURE — 74011250636 HC RX REV CODE- 250/636: Performed by: EMERGENCY MEDICINE

## 2018-06-14 PROCEDURE — 36415 COLL VENOUS BLD VENIPUNCTURE: CPT | Performed by: EMERGENCY MEDICINE

## 2018-06-14 PROCEDURE — 71275 CT ANGIOGRAPHY CHEST: CPT

## 2018-06-14 PROCEDURE — 74011636320 HC RX REV CODE- 636/320: Performed by: EMERGENCY MEDICINE

## 2018-06-14 PROCEDURE — 80053 COMPREHEN METABOLIC PANEL: CPT | Performed by: EMERGENCY MEDICINE

## 2018-06-14 RX ORDER — SODIUM CHLORIDE 0.9 % (FLUSH) 0.9 %
10 SYRINGE (ML) INJECTION
Status: COMPLETED | OUTPATIENT
Start: 2018-06-14 | End: 2018-06-14

## 2018-06-14 RX ORDER — SODIUM CHLORIDE 9 MG/ML
50 INJECTION, SOLUTION INTRAVENOUS
Status: COMPLETED | OUTPATIENT
Start: 2018-06-14 | End: 2018-06-14

## 2018-06-14 RX ADMIN — Medication 10 ML: at 14:17

## 2018-06-14 RX ADMIN — IOPAMIDOL 80 ML: 755 INJECTION, SOLUTION INTRAVENOUS at 14:12

## 2018-06-14 RX ADMIN — SODIUM CHLORIDE 50 ML/HR: 900 INJECTION, SOLUTION INTRAVENOUS at 14:17

## 2018-06-14 NOTE — ED NOTES
Pt sent by Dr. Daniele Balderas after a mass of some sort showed up in the echocardiogram study he had today. Dr. Libertad Rhoades at bedside.

## 2018-06-14 NOTE — ED PROVIDER NOTES
EMERGENCY DEPARTMENT HISTORY AND PHYSICAL EXAM      Date: 6/14/2018  Patient Name: Brandon Sol. History of Presenting Illness     Chief Complaint   Patient presents with    Abnormal Cardiac Test     Pt sent from Dr Zakiya Edward office following ECHO Per Dr Zakiya Edward pt with \"mass in pulmonary artery\" Dr Zakiya Edward saying pt needs CTA Pt cardiologist is Dr Mainor Thomas       History Provided By: Patient    HPI: Brandon Sol., 76 y.o. male with PMHx significant for A-Fib, HTN, HLD, and throat cancer, presents ambulatory to the ED after having echocardiogram at his Cardiologist's office, showing possible pulmonary artery abnormality today. Pt states he was scheduled for echocardiogram due to hx of A-fib, after normal follow up appointment with his Cardiologist last week, and was referred to ED to have CTA. At time of examination, pt states he is without complaint. He denies hx of DVT or PE. Pt specifically denies CP, palpitations, leg pain, or leg swelling. He denies tobacco use. There are no other complaints, changes, or physical findings at this time. PCP: Carson Cleveland MD   Cardiology: P.LUIGI Thomas MD    Current Outpatient Prescriptions   Medication Sig Dispense Refill    levothyroxine (SYNTHROID) 50 mcg tablet Take  by mouth Daily (before breakfast).  lovastatin (MEVACOR) 40 mg tablet Take 1 Tab by mouth daily. 90 Tab 3    amLODIPine (NORVASC) 5 mg tablet Take 1 Tab by mouth daily. 90 Tab 3    potassium chloride SR (K-TAB) 20 mEq tablet Take 1 Tab by mouth two (2) times a day. 180 Tab 1    ferrous sulfate ER (IRON) 160 mg (50 mg iron) TbER tablet Take 1 Tab by mouth daily.  rivaroxaban (XARELTO) 15 mg tab tablet Take 1 Tab by mouth daily (with dinner). (Patient taking differently: Take 15 mg by mouth daily (with breakfast). PT TAKING 3 DAYS THEN SKIPPING A DAY) 90 Tab 3    cholecalciferol, vitamin D3, (VITAMIN D3) 2,000 unit tab Take 2,000 Units by mouth daily.  Indications: PREVENTION OF VITAMIN D DEFICIENCY      triamterene-hydrochlorothiazide (MAXZIDE) 37.5-25 mg per tablet Take  by mouth daily. Past History     Past Medical History:  Past Medical History:   Diagnosis Date    Arrhythmia     a fib    Cancer ([facility]     skin, throat cancer    G tube feedings ([facility]     PEG    High cholesterol     Hypertension     Hypertrophic cardiomyopathy ([facility]     per cardiology notes 6/2013    Status post chemoradiation     completed 12/2013    Throat cancer [facility])        Past Surgical History:  Past Surgical History:   Procedure Laterality Date    COLONOSCOPY N/A 1/17/2017    COLONOSCOPY performed by Whitley Stephenson MD at [facility] ENDOSCOPY    HX HEART CATHETERIZATION  2007    HX ORTHOPAEDIC      Rt. hand surgery    HX OTHER SURGICAL      exc. skin cancer    HX OTHER SURGICAL      peg tube inserted    HX TONSILLECTOMY      NM EGD BALLOON DILATION ESOPHAGUS <30 MM DIAM  11/8/2013            Family History:  Family History   Problem Relation Age of Onset    Heart Attack Mother     Cancer Father        Social History:  Social History   Substance Use Topics    Smoking status: Never Smoker    Smokeless tobacco: Former User     Quit date: 3/30/2013    Alcohol use 0.0 oz/week     0 Standard drinks or equivalent per week      Comment: occasional       Allergies:  No Known Allergies      Review of Systems   Review of Systems   Constitutional: Negative for chills, fatigue and fever. HENT: Negative for congestion and rhinorrhea. Eyes: Negative for visual disturbance. Respiratory: Negative for cough, shortness of breath and wheezing. Cardiovascular: Negative for chest pain, palpitations and leg swelling. Gastrointestinal: Negative for abdominal distention, abdominal pain, constipation, diarrhea, nausea and vomiting. Endocrine: Negative. Genitourinary: Negative for difficulty urinating and dysuria. Musculoskeletal: Negative. Negative for myalgias.    Skin: Negative for rash.   Neurological: Negative for dizziness, weakness and light-headedness. Psychiatric/Behavioral: Negative for suicidal ideas. Physical Exam   Physical Exam   Constitutional: He is oriented to person, place, and time. He appears well-developed and well-nourished. No distress. HENT:   Head: Normocephalic and atraumatic. Mouth/Throat: Oropharynx is clear and moist.   Eyes: Conjunctivae and EOM are normal.   Neck: Neck supple. No JVD present. No tracheal deviation present. Cardiovascular: Normal rate, regular rhythm and intact distal pulses. Exam reveals no gallop and no friction rub. No murmur heard. Pulmonary/Chest: Effort normal and breath sounds normal. No stridor. No respiratory distress. He has no wheezes. Abdominal: Soft. Bowel sounds are normal. He exhibits no distension and no mass. There is no tenderness. There is no guarding. Musculoskeletal: Normal range of motion. He exhibits no edema or tenderness. No deformity   Neurological: He is alert and oriented to person, place, and time. He has normal strength. No focal deficits   Skin: Skin is warm, dry and intact. No rash noted. Psychiatric: He has a normal mood and affect. His behavior is normal. Judgment and thought content normal.   Nursing note and vitals reviewed.       Diagnostic Study Results     Labs -     Recent Results (from the past 12 hour(s))   METABOLIC PANEL, COMPREHENSIVE    Collection Time: 06/14/18  1:05 PM   Result Value Ref Range    Sodium 141 136 - 145 mmol/L    Potassium 3.4 (L) 3.5 - 5.1 mmol/L    Chloride 102 97 - 108 mmol/L    CO2 33 (H) 21 - 32 mmol/L    Anion gap 6 5 - 15 mmol/L    Glucose 100 65 - 100 mg/dL    BUN 26 (H) 6 - 20 MG/DL    Creatinine 1.21 0.70 - 1.30 MG/DL    BUN/Creatinine ratio 21 (H) 12 - 20      GFR est AA >60 >60 ml/min/1.73m2    GFR est non-AA 60 (L) >60 ml/min/1.73m2    Calcium 9.1 8.5 - 10.1 MG/DL    Bilirubin, total 0.5 0.2 - 1.0 MG/DL    ALT (SGPT) 26 12 - 78 U/L    AST (SGOT) 15 15 - 37 U/L    Alk. phosphatase 52 45 - 117 U/L    Protein, total 6.8 6.4 - 8.2 g/dL    Albumin 3.7 3.5 - 5.0 g/dL    Globulin 3.1 2.0 - 4.0 g/dL    A-G Ratio 1.2 1.1 - 2.2     CBC WITH AUTOMATED DIFF    Collection Time: 06/14/18  1:05 PM   Result Value Ref Range    WBC 4.6 4.1 - 11.1 K/uL    RBC 4.48 4.10 - 5.70 M/uL    HGB 14.6 12.1 - 17.0 g/dL    HCT 41.5 36.6 - 50.3 %    MCV 92.6 80.0 - 99.0 FL    MCH 32.6 26.0 - 34.0 PG    MCHC 35.2 30.0 - 36.5 g/dL    RDW 12.4 11.5 - 14.5 %    PLATELET 024 305 - 903 K/uL    MPV 10.5 8.9 - 12.9 FL    NRBC 0.0 0  WBC    ABSOLUTE NRBC 0.00 0.00 - 0.01 K/uL    NEUTROPHILS 62 32 - 75 %    LYMPHOCYTES 23 12 - 49 %    MONOCYTES 12 5 - 13 %    EOSINOPHILS 2 0 - 7 %    BASOPHILS 0 0 - 1 %    IMMATURE GRANULOCYTES 0 0.0 - 0.5 %    ABS. NEUTROPHILS 2.9 1.8 - 8.0 K/UL    ABS. LYMPHOCYTES 1.1 0.8 - 3.5 K/UL    ABS. MONOCYTES 0.5 0.0 - 1.0 K/UL    ABS. EOSINOPHILS 0.1 0.0 - 0.4 K/UL    ABS. BASOPHILS 0.0 0.0 - 0.1 K/UL    ABS. IMM. GRANS. 0.0 0.00 - 0.04 K/UL    DF AUTOMATED         Radiologic Studies -   CT Results  (Last 48 hours)               06/14/18 1401  CTA CHEST W OR W WO CONT Final result    Impression:  IMPRESSION:       1. No evidence of acute pulmonary embolus   2. Pulmonary nodules right middle lobe unchanged. Largest measures 7 mm. Narrative:  INDICATION: Possible mass in pulmonary artery on echo today       COMPARISON: January 19, 2018       TECHNIQUE:  2.5 mm axial images were obtained from the bases to the lung apices   after the intravenous administration of 80 cc of Isovue-370. Three-dimensional   postprocessing was performed by the technologist with MIP reconstructions. CT   dose reduction was achieved through use of a standardized protocol tailored for   this examination and automatic exposure control for dose modulation. FINDINGS:       THYROID: No nodule. MEDIASTINUM: No mass or lymphadenopathy. CATHY: Small right hilar node.  No pathologic adenopathy   THORACIC AORTA: No dissection or aneurysm. There are vascular calcifications   MAIN PULMONARY ARTERY: No acute pulmonary embolus   TRACHEA/BRONCHI: Patent. ESOPHAGUS: No wall thickening or dilatation. HEART: Normal in size. There are coronary artery calcifications   PLEURA: No effusion or pneumothorax. LUNGS: 7 mm nodule in the right middle lobe is again demonstrated. There is an   adjacent 3 mm nodule. This was likely present on the previous examination but   not well seen due to larger slice thickness on the previous studies. . No   airspace disease   INCIDENTALLY IMAGED UPPER ABDOMEN: Incidental small calcified stone within the   gallbladder. Gallbladder is nondistended. BONES: No destructive bone lesion. Medical Decision Making   I am the first provider for this patient. I reviewed the vital signs, available nursing notes, past medical history, past surgical history, family history and social history. Vital Signs-Reviewed the patient's vital signs. Patient Vitals for the past 12 hrs:   Temp Pulse Resp BP SpO2   06/14/18 1238 98 °F (36.7 °C) (!) 58 18 153/89 96 %   06/14/18 1237 - (!) 55 - 153/89 96 %       Pulse Oximetry Analysis - 96% on RA    Records Reviewed: Nursing Notes, Old Medical Records, Previous electrocardiograms, Previous Radiology Studies and Previous Laboratory Studies    Provider Notes (Medical Decision Making):     DDx: PE, malignancy    Will obtain CTA to evaluate possible mass on pulmonary artery    ED Course:   Initial assessment performed. The patients presenting problems have been discussed, and they are in agreement with the care plan formulated and outlined with them. I have encouraged them to ask questions as they arise throughout their visit. CONSULT NOTE:  3:09 PM  Angelia Byrd DO spoke with Vamsi Jara NP,  Specialty: Cardiology  Discussed patient's hx, disposition, and available diagnostic and imaging results.   Reviewed care plans.  Consultant agrees with plans as outlined. Disposition:  DISCHARGE NOTE:  2:38 PM  The patient is ready for discharge. The patients signs, symptoms, diagnosis, and instructions for discharge have been discussed and the pt has conveyed their understanding. The patient is to follow up as recommended or return to the ER should their symptoms worsen. Plan has been discussed and patient has conveyed their agreement. PLAN:  1. Follow-up Information     Follow up With Details Comments Contact Info    Lis Grigsby MD Schedule an appointment as soon as possible for a visit  [address]  [address] [place] [address]  [phone]      [facility] EMERGENCY DEPT  As needed, If symptoms worsen [address] [place] [address]  [phone]        Return to ED if worse     Diagnosis     Clinical Impression:   1. Abnormal echocardiogram        Attestations: This note is prepared by Darrius Villa. Leopold Dodrill, acting as Scribe for [employer], DO. [employer], DO: The scribe's documentation has been prepared under my direction and personally reviewed by me in its entirety. I confirm that the note above accurately reflects all work, treatment, procedures, and medical decision making performed by me.

## 2018-06-14 NOTE — PROGRESS NOTES
I spoke with Dr. Dominic Fink about the finding of an echogenic mass in the main pulmonary artery seen on echocardiogram today measuring 3.5 x 2.2 cm. He recommends that the patient go to the emergency room for CT angiogram and further evaluation. I spoke with Mr. Edgar Haynes who is in agreement and will head over there in the next half hour. I spoke with ER charge nurse Clark Garg who will inform Dr. Angela Evans.

## 2018-06-14 NOTE — ED NOTES
Discharge instructions reviewed w/ pt and copy given by Dr. Brianda Duran. Pt discharged ambulatory from the ED.

## 2018-06-16 DIAGNOSIS — I10 ESSENTIAL HYPERTENSION: ICD-10-CM

## 2018-06-20 RX ORDER — POTASSIUM CHLORIDE 20 MEQ/1
TABLET, EXTENDED RELEASE ORAL
Qty: 180 TAB | Refills: 0 | Status: SHIPPED | COMMUNITY
Start: 2018-06-20

## 2018-07-13 ENCOUNTER — CLINICAL SUPPORT (OUTPATIENT)
Dept: CARDIOLOGY CLINIC | Age: 68
End: 2018-07-13

## 2018-07-13 DIAGNOSIS — Z45.09 ENCOUNTER FOR LOOP RECORDER CHECK: ICD-10-CM

## 2018-07-13 DIAGNOSIS — I48.0 PAROXYSMAL ATRIAL FIBRILLATION (HCC): Primary | ICD-10-CM

## 2018-07-13 DIAGNOSIS — I49.5 SSS (SICK SINUS SYNDROME) (HCC): ICD-10-CM

## 2018-08-13 ENCOUNTER — CLINICAL SUPPORT (OUTPATIENT)
Dept: CARDIOLOGY CLINIC | Age: 68
End: 2018-08-13

## 2018-08-13 DIAGNOSIS — I49.5 SSS (SICK SINUS SYNDROME) (HCC): ICD-10-CM

## 2018-08-13 DIAGNOSIS — Z45.09 ENCOUNTER FOR LOOP RECORDER CHECK: ICD-10-CM

## 2018-08-13 DIAGNOSIS — I48.91 ATRIAL FIBRILLATION, UNSPECIFIED TYPE (HCC): Primary | ICD-10-CM

## 2018-09-04 ENCOUNTER — OFFICE VISIT (OUTPATIENT)
Dept: CARDIOLOGY CLINIC | Age: 68
End: 2018-09-04

## 2018-09-04 ENCOUNTER — CLINICAL SUPPORT (OUTPATIENT)
Dept: CARDIOLOGY CLINIC | Age: 68
End: 2018-09-04

## 2018-09-04 VITALS
BODY MASS INDEX: 30.8 KG/M2 | DIASTOLIC BLOOD PRESSURE: 80 MMHG | RESPIRATION RATE: 16 BRPM | HEART RATE: 50 BPM | WEIGHT: 203.2 LBS | HEIGHT: 68 IN | OXYGEN SATURATION: 96 % | SYSTOLIC BLOOD PRESSURE: 130 MMHG

## 2018-09-04 DIAGNOSIS — E78.2 MIXED HYPERLIPIDEMIA: ICD-10-CM

## 2018-09-04 DIAGNOSIS — I42.2 HYPERTROPHIC CARDIOMYOPATHY (HCC): ICD-10-CM

## 2018-09-04 DIAGNOSIS — I10 ESSENTIAL HYPERTENSION: ICD-10-CM

## 2018-09-04 DIAGNOSIS — I48.0 PAROXYSMAL ATRIAL FIBRILLATION (HCC): ICD-10-CM

## 2018-09-04 DIAGNOSIS — I48.91 ATRIAL FIBRILLATION, UNSPECIFIED TYPE (HCC): Primary | ICD-10-CM

## 2018-09-04 DIAGNOSIS — I49.5 SSS (SICK SINUS SYNDROME) (HCC): ICD-10-CM

## 2018-09-04 DIAGNOSIS — R00.1 BRADYCARDIA: ICD-10-CM

## 2018-09-04 DIAGNOSIS — I49.9 IRREGULAR HEART BEAT: ICD-10-CM

## 2018-09-04 DIAGNOSIS — I42.2 HYPERTROPHIC CARDIOMYOPATHY (HCC): Primary | ICD-10-CM

## 2018-09-04 NOTE — PROGRESS NOTES
1. Have you been to the ER, urgent care clinic since your last visit? Hospitalized since your last visit? Yes When: 6/2018 [facility] ? mass in Pulmonary Artery    2. Have you seen or consulted any other health care providers outside of the [address] [place] [address] since your last visit? Include any pap smears or colon screening. No       Patient has no cardiac complaints has concerns about his previous diagnosis of pulmonary artery clot.

## 2018-09-04 NOTE — PROGRESS NOTES
Subjective:      Arielle Adam is a 76 y.o. male is here for EP follow up. The patient denies chest pain/ shortness of breath, orthopnea, PND, LE edema, palpitations, syncope, presyncope or fatigue.        Patient Active Problem List    Diagnosis Date Noted    A-fib [facility]) 10/25/2017    Irregular heart beat 10/12/2017    Sepsis ([address] 08/20/2016    SSS (sick sinus syndrome) ([address] 11/30/2015    Hypertrophic cardiomyopathy ([address] 12/03/2014    Atrial fibrillation ([address] 11/12/2014    HTN (hypertension) 11/12/2014    Mixed hyperlipidemia 11/12/2014      Wendy Hernandez MD  Past Medical History:   Diagnosis Date    Arrhythmia     a fib    Cancer ([address]     skin, throat cancer    G tube feedings ([address]     PEG    High cholesterol     Hypertension     [facility]pert[facility] [address] [facility]     per cardiology notes 6/2013    Status post chemoradiation     completed 12/2013    Throat cancer [facility])       Past Surgical History:   Procedure Laterality Date    COLONOSCOPY N/A 1/17/2017    COLONOS[facility] performed by Manav Dubois [facility] at [facility] ENDOSCOPY    HX HEART CATHETERIZATION  2007    HX ORTHOPAEDIC      Rt. hand surgery    HX OTHER SURGICAL      exc. skin cancer    HX OTHER SURGICAL      peg tube inserted    HX TONSILLECTOMY      [place] EGD BALLOON DILATION ESOPHAGUS <30 MM DIAM  11/8/2013          No Known Allergies   Family History   Problem Relation Age of Onset    Heart Attack Mother     Cancer Father     negative for cardiac disease  Social History     Social History    Marital status:      Spouse name: N/A    Number of children: N/A    Years of education: N/A     Social History Main Topics    Smoking status: Never Smoker    Smokeless tobacco: Former User     Quit date: 3/30/2013    Alcohol use 0.0 oz/week     0 Standard drinks or equivalent per week      Comment: occasional    Drug use: No    Sexual activity: Not Currently     Other Topics Concern    None     Social History Narrative     Current Outpatient Prescriptions   Medication Sig    rivaroxaban (XARELTO) 15 mg tab tablet Take 1 Tab by mouth daily (with dinner).  potassium chloride (K-DUR, KLOR-CON) 20 mEq tablet TAKE ONE TABLET BY MOUTH TWICE DAILY     levothyroxine (SYNTHROID) 50 mcg tablet Take  by mouth Daily (before breakfast).  lovastatin (MEVACOR) 40 mg tablet Take 1 Tab by mouth daily.  amLODIPine (NORVASC) 5 mg tablet Take 1 Tab by mouth daily.  cholecalciferol, vitamin D3, (VITAMIN D3) 2,000 unit tab Take 2,000 Units by mouth daily. Indications: PREVENTION OF VITAMIN D DEFICIENCY    triamterene-hydrochlorothiazide (MAXZIDE) 37.5-25 mg per tablet Take  by mouth daily.  ferrous sulfate ER (IRON) 160 mg (50 mg iron) TbER tablet Take 1 Tab by mouth daily. No current facility-administered medications for this visit. Vitals:    09/04/18 0919   BP: 130/80   Pulse: (!) 50   Resp: 16   SpO2: 96%   Weight: 203 lb 3.2 oz (92.2 kg)   Height: 5' 8\" (1.727 m)       I have reviewed the nurses notes, vitals, problem list, allergy list, medical history, family, social history and medications. Review of Symptoms:    General: Pt denies excessive weight gain or loss. Pt is able to conduct ADL's  HEENT: Denies blurred vision, headaches, epistaxis and difficulty swallowing. Respiratory: Denies shortness of breath, DUMONT, wheezing or stridor. Cardiovascular: Denies precordial pain, palpitations, edema or PND  Gastrointestinal: Denies poor appetite, indigestion, abdominal pain or blood in stool  Urinary: Denies dysuria, pyuria  Musculoskeletal: Denies pain or swelling from muscles or joints  Neurologic: Denies tremor, paresthesias, or sensory motor disturbance  Skin: Denies rash, itching or texture change. Psych: Denies depression      Physical Exam:      General: Well developed, in no acute distress. HEENT: Eyes - PERRL, no jvd  Heart:  Normal S1/S2 negative S3 or S4.  Regular, no murmur, gallop or rub.   Respiratory: Clear bilaterally x 4, no wheezing or rales  Extremities:  No edema, normal cap refill, no cyanosis. Musculoskeletal: No clubbing  Neuro: A&Ox3, speech clear, gait stable. Skin: Skin color is normal. No rashes or lesions. Non diaphoretic  Vascular: 2+ pulses symmetric in all extremities    Cardiographics    Ekg: Sinus  Bradycardia  -With rate variation   cv = 19. Possible left ventricular hypertrophy on non-voltage basis. -ST depression   Ventricular rate 51    Results for orders placed or performed during the hospital encounter of 10/25/17   EKG, 12 LEAD, INITIAL   Result Value Ref Range    Ventricular Rate 69 BPM    Atrial Rate 69 BPM    P-R Interval 170 ms    QRS Duration 88 ms    Q-T Interval 436 ms    QTC Calculation (Bezet) 467 ms    Calculated P Axis 75 degrees    Calculated R Axis 53 degrees    Calculated T Axis -148 degrees    Diagnosis       Normal sinus rhythm  Left ventricular hypertrophy with repolarization abnormality  When compared with ECG of 05-OCT-2017 10:23,  ST more depressed in Inferior leads  ST now depressed in Anterior leads  Confirmed by Saman Lopez ([id number]) on 10/26/2017 3:07:29 PM     Results for orders placed or performed in visit on 11/30/15   CARDIAC HOLTER MONITOR, 24 HOURS    Narrative    ECG Monitor/24 hours, Complete    Reason for Holter Monitor   BRADYCARDIA    Heartbeat    Slowest 37  Average 48  Fastest  75          Results:   Underlying Rhythm: Sinus bradycardia      Atrial Arrhythmias: premature atrial contractions; frequent             AV Conduction: normal    Ventricular Arrhythmias: premature ventricular contractions;  occasional     ST Segment Analysis:non-specific changes     Symptom Correlation:  none    Comment:   Sinus bradycardia, no significant pauses.      Meghann Cornell MD                   Lab Results   Component Value Date/Time    WBC 4.6 06/14/2018 01:05 PM    HGB 14.6 06/14/2018 01:05 PM    HCT 41.5 06/14/2018 01:05 PM PLATELET 635 66/57/6641 01:05 PM    MCV 92.6 06/14/2018 01:05 PM      Lab Results   Component Value Date/Time    Sodium 141 06/14/2018 01:05 PM    Potassium 3.4 (L) 06/14/2018 01:05 PM    Chloride 102 06/14/2018 01:05 PM    CO2 33 (H) 06/14/2018 01:05 PM    Anion gap 6 06/14/2018 01:05 PM    Glucose 100 06/14/2018 01:05 PM    BUN 26 (H) 06/14/2018 01:05 PM    Creatinine 1.21 06/14/2018 01:05 PM    BUN/Creatinine ratio 21 (H) 06/14/2018 01:05 PM    GFR est AA >60 06/14/2018 01:05 PM    GFR est non-AA 60 (L) 06/14/2018 01:05 PM    Calcium 9.1 06/14/2018 01:05 PM    Bilirubin, total 0.5 06/14/2018 01:05 PM    AST (SGOT) 15 06/14/2018 01:05 PM    Alk. phosphatase 52 06/14/2018 01:05 PM    Protein, total 6.8 06/14/2018 01:05 PM    Albumin 3.7 06/14/2018 01:05 PM    Globulin 3.1 06/14/2018 01:05 PM    A-G Ratio 1.2 06/14/2018 01:05 PM    ALT (SGPT) 26 06/14/2018 01:05 PM      Lab Results   Component Value Date/Time    TSH 1.960 03/31/2015 12:45 PM       ASSESSMENT:             ICD-10-CM ICD-9-CM    1. Hypertrophic cardiomyopathy (HCC) I42.2 425.18 2D ECHO COMPLETE ADULT (TTE) W OR WO CONTR      AMB POC EKG ROUTINE W/ 12 LEADS, INTER & REP      2D ECHO LIMTED ADULT W OR WO CONTR      CARDIAC HOLTER MONITOR, 24 HOURS   2. SSS (sick sinus syndrome) (HCC) I49.5 427.81 2D ECHO LIMTED ADULT W OR WO CONTR      CARDIAC HOLTER MONITOR, 24 HOURS   3. Mixed hyperlipidemia E78.2 272.2 2D ECHO LIMTED ADULT W OR WO CONTR      CARDIAC HOLTER MONITOR, 24 HOURS   4. Essential hypertension I10 401.9 2D ECHO LIMTED ADULT W OR WO CONTR      CARDIAC HOLTER MONITOR, 24 HOURS   5. BMI 30.0-30.9,adult Z68.30 V85.30 2D ECHO LIMTED ADULT W OR WO CONTR      CARDIAC HOLTER MONITOR, 24 HOURS   6.  Bradycardia R00.1 427.89      Orders Placed This Encounter    AMB POC EKG ROUTINE W/ 12 LEADS, INTER & REP     Order Specific Question:   Reason for Exam:     Answer:   routine    HOLTER MONITOR, 24 HOURS, Clinic Performed     Standing Status:   Future Standing Expiration Date:   3/4/2019     Order Specific Question:   Reason for Exam:     Answer:   bradycardia    2D ECHO COMPLETE ADULT (TTE) W OR WO CONTR     Order Specific Question:   Reason for Exam:     Answer:   f/u clot     Order Specific Question:   Contrast Enhancement (Bubble Study, Definity, Optison) may be used if criteria listed in established evidence-based protocol has been identified. Answer: Yes    2D ECHO LIMTED ADULT W OR WO CONTR     Order Specific Question:   Reason for Exam:     Answer:   ? mass in pulm artery. Order Specific Question:   Contrast Enhancement (Bubble Study, Definity, Optison) may be used if criteria listed in established evidence-based protocol has been identified. Answer:   Yes        Plan:     Mr Selin Llamas is in sinus and asymptomatic. He is on xarelto 15 due to his risk of CHADSVASC 2. Echo earlier this year with noted echogenic non mobile mass visualized in main pulmonary  artery measuring 3.5 x 2.2 cm suggestive of thrombus. CT was negative. Follow up echo today. Holter today for bradycardia at rate of 51. He will f/u with Dr Cherry Cooney for his hocm and with me in 6 months.     Continue medical management for htn, aF and HOCM. Thank you for allowing me to participate in Clau Espino 's care. Ty Lopez NP    Patient seen and examined. All pertinent data reviewed. I have reviewed detailed note as outlined by Ty Lopez NP. Case discussed with Nursing/medical assistant staff and Ty Lopez NP. Plans as outlined. Pt with AF abl. In sinus. Explained to him that xarelto at 20 mg was therapeutic and not at 15 mg. If he had concerns about thrombus, he should be on coumadin. He reiterated that he wanted to remain on xarelto at 15 mg. Will obtain a monitor for profound jonathon on ekg. Not on av concha agents for htn. F/u in 6 months.     Mikayla Pham MD, Nael Knight

## 2018-09-05 ENCOUNTER — TELEPHONE (OUTPATIENT)
Dept: CARDIOLOGY CLINIC | Age: 68
End: 2018-09-05

## 2018-09-05 NOTE — TELEPHONE ENCOUNTER
----- Message from JOSE Shaw sent at 9/4/2018  4:19 PM EDT -----  Pt had echo per your request today. ? Echo dense structure vs thrombus. We have him on eliquis (15mg his choice) still however he would like to stop. Thanks. Monique   ----- Message -----     From: Mauri, Card Result In     Sent: 9/4/2018   3:54 PM       To:  JOSE Santiago

## 2018-09-05 NOTE — TELEPHONE ENCOUNTER
Verified patient with two identifiers. Spoke with pt, advising him per Dr. Brennan Reyes he can stop Xarelto. Pt verbalized understanding.

## 2018-09-12 NOTE — PROGRESS NOTES
pls let pt know that his HR drops significantly and he has fast heart beats. Dr Wyatt Strauss would like to see him to discuss.

## 2018-09-12 NOTE — PROGRESS NOTES
Echo reviewed. Dense structure present, looks more like artifact.  CT was normal. Can stop eliquis.thx.

## 2018-09-13 ENCOUNTER — CLINICAL SUPPORT (OUTPATIENT)
Dept: CARDIOLOGY CLINIC | Age: 68
End: 2018-09-13

## 2018-09-13 DIAGNOSIS — Z45.09 ENCOUNTER FOR LOOP RECORDER CHECK: ICD-10-CM

## 2018-09-13 DIAGNOSIS — I48.91 ATRIAL FIBRILLATION, UNSPECIFIED TYPE (HCC): Primary | ICD-10-CM

## 2018-09-13 NOTE — PROGRESS NOTES
Called, spoke to pt, two identifiers verified. Advised pt of his holter monitor results and that he needs to come in to be seen. Made an appt for 9/17/18 at 2pm with JOSE Brooks. Pt verbalized understanding of information discussed w/ no further questions at this time.

## 2018-09-18 ENCOUNTER — OFFICE VISIT (OUTPATIENT)
Dept: CARDIOLOGY CLINIC | Age: 68
End: 2018-09-18

## 2018-09-18 VITALS — BODY MASS INDEX: 30.34 KG/M2 | HEIGHT: 68 IN | WEIGHT: 200.2 LBS

## 2018-09-18 DIAGNOSIS — I10 ESSENTIAL HYPERTENSION: ICD-10-CM

## 2018-09-18 DIAGNOSIS — I49.5 SSS (SICK SINUS SYNDROME) (HCC): Primary | ICD-10-CM

## 2018-09-18 DIAGNOSIS — I48.0 PAROXYSMAL ATRIAL FIBRILLATION (HCC): ICD-10-CM

## 2018-09-18 RX ORDER — ASPIRIN 81 MG/1
TABLET ORAL DAILY
COMMUNITY
End: 2018-12-19

## 2018-09-18 NOTE — PROGRESS NOTES
Chief Complaint   Patient presents with    Results     Holter monitor results     1. Have you been to the ER, urgent care clinic since your last visit? Hospitalized since your last visit? No    2. Have you seen or consulted any other health care providers outside of the [address] [place] [address] since your last visit? Include any pap smears or colon screening.  No

## 2018-09-18 NOTE — PROGRESS NOTES
Subjective:      Maxwell Hernandez is a 76 y.o. male is here for EP consult. He had an abnormal monitor and was here to discuss results. He has fatigue.      Patient Active Problem List    Diagnosis Date Noted    A-fib [facility]) 10/25/2017    Irregular heart beat 10/12/2017    Sepsis ([address] 08/20/2016    SSS (sick sinus syndrome) ([address] 11/30/2015    Hypertrophic cardiomyopathy ([address] 12/03/2014    Atrial fibrillation ([address] 11/12/2014    HTN (hypertension) 11/12/2014    Mixed hyperlipidemia 11/12/2014      Sara Palmer MD  Past Medical History:   Diagnosis Date    Arrhythmia     a fib    Cancer ([address]     skin, throat cancer    G tube feedings ([address]     PEG    High cholesterol     Hypertension     Hypert[facility]     per cardiology notes 6/2013    Status post chemoradiation     completed 12/2013    Throat cancer [facility])       Past Surgical History:   Procedure Laterality Date    COLONOSCOPY N/A 1/17/2017    COLONOSCOPY performed by Avril Ramirez MD at [facility] ENDOSCOPY    HX HEART CATHETERIZATION  2007    HX ORTHOPAEDIC      Rt. hand surgery    HX OTHER SURGICAL      exc. skin cancer    HX OTHER SURGICAL      peg tube inserted    HX TONSILLECTOMY      [place] EGD BALLOON DILATION ESOPHAGUS <30 MM DIAM  11/8/2013          No Known Allergies   Family History   Problem Relation Age of Onset    Heart Attack Mother     Cancer Father     negative for cardiac disease  Social History     Social History    Marital status:      Spouse name: N/A    Number of children: N/A    Years of education: N/A     Social History Main Topics    Smoking status: Never Smoker    Smokeless tobacco: Former User     Quit date: 3/30/2013    Alcohol use 0.0 oz/week     0 Standard drinks or equivalent per week      Comment: occasional    Drug use: No    Sexual activity: Not Currently     Other Topics Concern    None     Social History Narrative     Current Outpatient Prescriptions Medication Sig    aspirin delayed-release 81 mg tablet Take  by mouth daily.  potassium chloride (K-DUR, KLOR-CON) 20 mEq tablet TAKE ONE TABLET BY MOUTH TWICE DAILY     levothyroxine (SYNTHROID) 50 mcg tablet Take  by mouth Daily (before breakfast).  lovastatin (MEVACOR) 40 mg tablet Take 1 Tab by mouth daily.  amLODIPine (NORVASC) 5 mg tablet Take 1 Tab by mouth daily.  ferrous sulfate ER (IRON) 160 mg (50 mg iron) TbER tablet Take 1 Tab by mouth daily.  cholecalciferol, vitamin D3, (VITAMIN D3) 2,000 unit tab Take 2,000 Units by mouth daily. Indications: PREVENTION OF VITAMIN D DEFICIENCY    triamterene-hydrochlorothiazide (MAXZIDE) 37.5-25 mg per tablet Take  by mouth daily.  rivaroxaban (XARELTO) 15 mg tab tablet Take 1 Tab by mouth daily (with dinner). No current facility-administered medications for this visit. Vitals:    09/18/18 1318   Weight: 200 lb 3.2 oz (90.8 kg)   Height: 5' 8\" (1.727 m)       I have reviewed the nurses notes, vitals, problem list, allergy list, medical history, family, social history and medications. Review of Symptoms:    General: Pt denies excessive weight gain or loss. Pt is able to conduct ADL's  HEENT: Denies blurred vision, headaches, epistaxis and difficulty swallowing. Respiratory: Denies shortness of breath, DUMONT, wheezing or stridor. Cardiovascular: Denies precordial pain, palpitations, edema or PND  Gastrointestinal: Denies poor appetite, indigestion, abdominal pain or blood in stool  Urinary: Denies dysuria, pyuria  Musculoskeletal: Denies pain or swelling from muscles or joints  Neurologic: Denies tremor, paresthesias, or sensory motor disturbance  Skin: Denies rash, itching or texture change. Psych: Denies depression      Physical Exam:      General: Well developed, in no acute distress. HEENT: Eyes - PERRL, no jvd  Heart:  Normal S1/S2 negative S3 or S4.  Regular, no murmur, gallop or rub.   Respiratory: Clear bilaterally x 4, no wheezing or rales  Abdomen:   Soft, non-tender, bowel sounds are active.   Extremities:  No edema, normal cap refill, no cyanosis. Musculoskeletal: No clubbing  Neuro: A&Ox3, speech clear, gait stable. Skin: Skin color is normal. No rashes or lesions. Non diaphoretic  Vascular: 2+ pulses symmetric in all extremities    Cardiographics    Ekg: nsr    Results for orders placed or performed in visit on 09/04/18   CARDIAC HOLTER MONITOR, 24 HOURS    Narrative    ECG Monitor/24 hours, Complete    Reason for Holter Monitor  BRADYCARDIA    Heartbeat    Slowest 37  Average 58  Fastest  118      Results:   Underlying Rhythm: Sinus bradycardia      Atrial Arrhythmias: premature atrial contractions; occasional, atrial couplets, paroxysmal supraventricular tachycardia and severe bradycardia            AV Conduction: normal    Ventricular Arrhythmias: premature ventricular contractions; occasional and ventricular couplets     ST Segment Analysis:normal     Symptom Correlation:  none    Comment:   Sinus bradycardia with profound bradycardia to 36 bpm with short burst of PAT and ventricular ectopy.  Clinical correlation advised     Farnaz Altman MD, Melba Chao      Results for orders placed or performed during the hospital encounter of 10/25/17   EKG, 12 LEAD, INITIAL   Result Value Ref Range    Ventricular Rate 69 BPM    Atrial Rate 69 BPM    P-R Interval 170 ms    QRS Duration 88 ms    Q-T Interval 436 ms    QTC Calculation (Bezet) 467 ms    Calculated P Axis 75 degrees    Calculated R Axis 53 degrees    Calculated T Axis -148 degrees    Diagnosis       Normal sinus rhythm  Left ventricular hypertrophy with repolarization abnormality  When compared with ECG of 05-OCT-2017 10:23,  ST more depressed in Inferior leads  ST now depressed in Anterior leads  Confirmed by Hesham Lang ([id number]) on 10/26/2017 3:07:29 PM           Lab Results   Component Value Date/Time    WBC 4.6 06/14/2018 01:05 PM    HGB 14.6 06/14/2018 01:05 PM HCT 41.5 06/14/2018 01:05 PM    PLATELET 464 97/86/0511 01:05 PM    MCV 92.6 06/14/2018 01:05 PM      Lab Results   Component Value Date/Time    Sodium 141 06/14/2018 01:05 PM    Potassium 3.4 (L) 06/14/2018 01:05 PM    Chloride 102 06/14/2018 01:05 PM    CO2 33 (H) 06/14/2018 01:05 PM    Anion gap 6 06/14/2018 01:05 PM    Glucose 100 06/14/2018 01:05 PM    BUN 26 (H) 06/14/2018 01:05 PM    Creatinine 1.21 06/14/2018 01:05 PM    BUN/Creatinine ratio 21 (H) 06/14/2018 01:05 PM    GFR est AA >60 06/14/2018 01:05 PM    GFR est non-AA 60 (L) 06/14/2018 01:05 PM    Calcium 9.1 06/14/2018 01:05 PM    Bilirubin, total 0.5 06/14/2018 01:05 PM    AST (SGOT) 15 06/14/2018 01:05 PM    Alk. phosphatase 52 06/14/2018 01:05 PM    Protein, total 6.8 06/14/2018 01:05 PM    Albumin 3.7 06/14/2018 01:05 PM    Globulin 3.1 06/14/2018 01:05 PM    A-G Ratio 1.2 06/14/2018 01:05 PM    ALT (SGPT) 26 06/14/2018 01:05 PM         Assessment:     Assessment:        ICD-10-CM ICD-9-CM    1. SSS (sick sinus syndrome) ([facility]) I49.5 427.81    2. Essential hypertension I10 401.9    3. Paroxysmal atrial fibrillation ([facility]) I48.0 427.31      Orders Placed This Encounter    aspirin delayed-release 81 mg tablet     Sig: Take  by mouth daily. Plan:   Mr Carlos Pascual had profound bradycardia to the 30s while awake (he awakes at 5 am). He complains of fatigue. He appears to have symptomatic bradycardia. We discussed pacemaker as means to manage that but he is hesitant to proceed for now. I explained to him that he should contact us immediately if he had a syncopal event. He understands and will f/u in one year. Continue medical management for htn, symptomatic jonathon, afib. Thank you for allowing me to participate in Elvi Barbosa 's care.     Maxime Louise MD, Jonathan Giron

## 2018-12-19 ENCOUNTER — OFFICE VISIT (OUTPATIENT)
Dept: CARDIOLOGY CLINIC | Age: 68
End: 2018-12-19

## 2018-12-19 VITALS
RESPIRATION RATE: 16 BRPM | DIASTOLIC BLOOD PRESSURE: 76 MMHG | HEIGHT: 68 IN | BODY MASS INDEX: 31.78 KG/M2 | HEART RATE: 78 BPM | WEIGHT: 209.7 LBS | OXYGEN SATURATION: 97 % | SYSTOLIC BLOOD PRESSURE: 136 MMHG

## 2018-12-19 DIAGNOSIS — E78.2 MIXED HYPERLIPIDEMIA: ICD-10-CM

## 2018-12-19 DIAGNOSIS — I48.0 PAROXYSMAL ATRIAL FIBRILLATION (HCC): Primary | ICD-10-CM

## 2018-12-19 DIAGNOSIS — I10 ESSENTIAL HYPERTENSION: ICD-10-CM

## 2018-12-19 DIAGNOSIS — I42.2 HYPERTROPHIC CARDIOMYOPATHY (HCC): ICD-10-CM

## 2018-12-19 DIAGNOSIS — I49.5 SSS (SICK SINUS SYNDROME) (HCC): ICD-10-CM

## 2018-12-19 RX ORDER — ASPIRIN 325 MG
325 TABLET ORAL
COMMUNITY

## 2018-12-19 NOTE — PROGRESS NOTES
[address] [place] [address], [address] [facility]  [phone]     Subjective:      Clovia Dubin is a 76 y.o. male is here for routine f/u. The patient denies chest pain/ shortness of breath, orthopnea, PND, LE edema, palpitations, syncope, or presyncope.        Patient Active Problem List    Diagnosis Date Noted    Essential hypertension 12/19/2018    A-fib ([address] 10/25/2017    Irregular heart beat 10/12/2017    Sepsis ([address] 08/20/2016    SSS (sick sinus syndrome) ([address] 11/30/2015    Hypertrophic cardiomyopathy ([address] 12/03/2014    Atrial fibrillation ([address] 11/12/2014    HTN (hypertension) 11/12/2014    Mixed hyperlipidemia 11/12/2014      Tang Izaguirre MD  Past Medical History:   Diagnosis Date    Arrhythmia     a fib    Cancer ([address]     skin, throat cancer    G tube feedings ([address]     PEG    High cholesterol     Hypertension     Hypertrophic cardiomyopathy ([address]     per cardiology notes 6/2013    Status post chemoradiation     completed 12/2013    Throat cancer [facility])       Past Surgical History:   Procedure Laterality Date    COLONOSCOPY N/A 1/17/2017    COLONOSCOPY performed by Vickie Garcia MD at [facility] ENDOSCOPY    HX HEART CATHETERIZATION  2007    HX ORTHOPAEDIC      Rt. hand surgery    HX OTHER SURGICAL      exc. skin cancer    HX OTHER SURGICAL      peg tube inserted    HX TONSILLECTOMY      NV EGD BALLOON DILATION ESOPHAGUS <30 MM DIAM  11/8/2013          No Known Allergies   Family History   Problem Relation Age of Onset    Heart Attack Mother     Cancer Father       Social History     Socioeconomic History    Marital status:      Spouse name: Not on file    Number of children: Not on file    Years of education: Not on file    Highest education level: Not on file   Social Needs    Financial resource strain: Not on file    Food insecurity - worry: Not on file    Food insecurity - inability: Not on file    Transportation needs - medical: Not on file   [employer] needs - non-medical: Not on file   Occupational History    Not on file   Tobacco Use    Smoking status: Never Smoker    Smokeless tobacco: Former User   Substance and Sexual Activity    Alcohol use: Yes     Alcohol/week: 0.0 oz     Comment: occasional    Drug use: No    Sexual activity: Not Currently   Other Topics Concern    Not on file   Social History Narrative    Not on file      Current Outpatient Medications   Medication Sig    aspirin (ASPIRIN) 325 mg tablet Take 325 mg by mouth daily.  potassium chloride (K-DUR, KLOR-CON) 20 mEq tablet TAKE ONE TABLET BY MOUTH TWICE DAILY     levothyroxine (SYNTHROID) 50 mcg tablet Take  by mouth Daily (before breakfast).  lovastatin (MEVACOR) 40 mg tablet Take 1 Tab by mouth daily.  amLODIPine (NORVASC) 5 mg tablet Take 1 Tab by mouth daily.  ferrous sulfate ER (IRON) 160 mg (50 mg iron) TbER tablet Take 1 Tab by mouth daily.  cholecalciferol, vitamin D3, (VITAMIN D3) 2,000 unit tab Take 2,000 Units by mouth daily. Indications: PREVENTION OF VITAMIN D DEFICIENCY    triamterene-hydrochlorothiazide (MAXZIDE) 37.5-25 mg per tablet Take  by mouth daily. No current facility-administered medications for this visit. Review of Symptoms:  11 systems reviewed, negative other than as stated in the HPI    Physical ExamPhysical Exam:    Vitals:    12/19/18 1008 12/19/18 1018 12/19/18 1033   BP: 148/80 142/82 136/76   Pulse: 78     Resp: 16     SpO2: 97%     Weight: 209 lb 11.2 oz (95.1 kg)     Height: 5' 8\" (1.727 m)       Body mass index is 31.88 kg/m². General PE   Gen:  NAD  Mental Status - Alert. General Appearance - Not in acute distress. Chest and Lung Exam   Inspection: Accessory muscles - No use of accessory muscles in breathing.    Auscultation:   Breath sounds: - Normal.   Cardiovascular   Inspection: Jugular vein - Bilateral - Inspection Normal.   Palpation/Percussion:   Apical Impulse: - Normal. Auscultation: Rhythm - Regular. Heart Sounds - S1 WNL and S2 WNL. No S3 or S4. Murmurs & Other Heart Sounds: Auscultation of the heart reveals - No Murmurs. Peripheral Vascular   Upper Extremity: Inspection - Bilateral - No Cyanotic nailbeds or Digital clubbing. Lower Extremity:   Palpation: Edema - Bilateral - No edema. Abdomen:   Soft, non-tender, bowel sounds are active. Neuro: A&O times 3, CN and motor grossly WNL    Labs:   Lab Results   Component Value Date/Time    Cholesterol, total 177 09/18/2009 09:08 AM    Cholesterol, total 224 (H) 05/18/2009 08:41 AM    HDL Cholesterol 51 09/18/2009 09:08 AM    HDL Cholesterol 61 (H) 05/18/2009 08:41 AM    LDL, calculated 113 (H) 09/18/2009 09:08 AM    LDL, calculated 152.4 (H) 05/18/2009 08:41 AM    Triglyceride 65 09/18/2009 09:08 AM    Triglyceride 53 05/18/2009 08:41 AM    CHOL/HDL Ratio 3.5 09/18/2009 09:08 AM    CHOL/HDL Ratio 3.7 05/18/2009 08:41 AM     Lab Results   Component Value Date/Time    CK 84 08/22/2016 04:06 AM     Lab Results   Component Value Date/Time    Sodium 141 06/14/2018 01:05 PM    Potassium 3.4 (L) 06/14/2018 01:05 PM    Chloride 102 06/14/2018 01:05 PM    CO2 33 (H) 06/14/2018 01:05 PM    Anion gap 6 06/14/2018 01:05 PM    Glucose 100 06/14/2018 01:05 PM    BUN 26 (H) 06/14/2018 01:05 PM    Creatinine 1.21 06/14/2018 01:05 PM    BUN/Creatinine ratio 21 (H) 06/14/2018 01:05 PM    GFR est AA >60 06/14/2018 01:05 PM    GFR est non-AA 60 (L) 06/14/2018 01:05 PM    Calcium 9.1 06/14/2018 01:05 PM    Bilirubin, total 0.5 06/14/2018 01:05 PM    AST (SGOT) 15 06/14/2018 01:05 PM    Alk. phosphatase 52 06/14/2018 01:05 PM    Protein, total 6.8 06/14/2018 01:05 PM    Albumin 3.7 06/14/2018 01:05 PM    Globulin 3.1 06/14/2018 01:05 PM    A-G Ratio 1.2 06/14/2018 01:05 PM    ALT (SGPT) 26 06/14/2018 01:05 PM       EKG:  NSR      Assessment:     Assessment:      1. Paroxysmal atrial fibrillation (HCC)    2. Essential hypertension    3.  Mixed hyperlipidemia    4. SSS (sick sinus syndrome) ([facility]    5. Hypertrophic cardiomyopathy ([facility]        Orders Placed This Encounter    AMB POC EKG ROUTINE W/ 12 LEADS, INTER & REP     Order Specific Question:   Reason for Exam:     Answer:   ROUTINE    aspirin (ASPIRIN) 325 mg tablet     Sig: Take 325 mg by mouth daily. Plan:     Pt presents for f/u, doing well and stable from cardiac standpoint. History of paroxysmal atrial fibrillation, post ablation   Holter 9/18: Sinus bradycardia with profound bradycardia to 36 bpm with short burst of PAT and ventricular ectopy. Maintaining sinus rhythm. Has f/u with Dr Karyn Laguerre  --- pt c/o fatigue and he thinks it could be symptomatic bradycardia. He knows to call us if he has any syncopal episode. Off Xarelto and now on ASA . Hyperlipidemia  7/18 LDL 80. On statin therapy. Labs and lipid per PCP     HTN  Controlled with current therapy     History of hypertrophic cardiomyopathy  Normal EF with no significant valvular pathology per echo 09/18  Stable. Hx normal nuclear exercise stress test in 1/15. Continue current care and f/u in 6 months. Mason Wells NP       [facility]    12/20/2018         Patient seen, examined by me personally. Plan discussed as detailed. Agree with note as outlined by  NP. I confirm findings in history and physical exam. No additional findings noted. Agree with plan as outlined above. Has ILR, followed by EP.     Katy Santos MD

## 2019-02-26 RX ORDER — AMLODIPINE BESYLATE 5 MG/1
TABLET ORAL
Qty: 90 TAB | Refills: 3 | Status: SHIPPED | OUTPATIENT
Start: 2019-02-26 | End: 2020-06-25

## 2019-02-26 RX ORDER — LOVASTATIN 40 MG/1
TABLET ORAL
Qty: 90 TAB | Refills: 3 | Status: SHIPPED | OUTPATIENT
Start: 2019-02-26 | End: 2020-02-17

## 2019-06-19 ENCOUNTER — OFFICE VISIT (OUTPATIENT)
Dept: CARDIOLOGY CLINIC | Age: 69
End: 2019-06-19

## 2019-06-19 DIAGNOSIS — Z45.09 ENCOUNTER FOR LOOP RECORDER CHECK: ICD-10-CM

## 2019-06-19 DIAGNOSIS — I48.0 PAROXYSMAL ATRIAL FIBRILLATION (HCC): Primary | ICD-10-CM

## 2019-09-23 ENCOUNTER — OFFICE VISIT (OUTPATIENT)
Dept: CARDIOLOGY CLINIC | Age: 69
End: 2019-09-23

## 2019-09-23 DIAGNOSIS — I48.0 PAROXYSMAL ATRIAL FIBRILLATION (HCC): Primary | ICD-10-CM

## 2019-09-23 DIAGNOSIS — Z45.09 ENCOUNTER FOR LOOP RECORDER CHECK: ICD-10-CM

## 2019-12-05 ENCOUNTER — HOSPITAL ENCOUNTER (OUTPATIENT)
Dept: GENERAL RADIOLOGY | Age: 69
Discharge: HOME | End: 2019-12-05
Payer: MEDICARE

## 2019-12-05 DIAGNOSIS — R05.9 COUGH: ICD-10-CM

## 2019-12-05 PROCEDURE — 71046 X-RAY EXAM CHEST 2 VIEWS: CPT

## 2019-12-23 ENCOUNTER — OFFICE VISIT (OUTPATIENT)
Dept: CARDIOLOGY CLINIC | Age: 69
End: 2019-12-23

## 2019-12-23 DIAGNOSIS — I48.0 PAROXYSMAL ATRIAL FIBRILLATION (HCC): Primary | ICD-10-CM

## 2019-12-23 DIAGNOSIS — Z45.09 ENCOUNTER FOR LOOP RECORDER CHECK: ICD-10-CM

## 2020-01-08 ENCOUNTER — OFFICE VISIT (OUTPATIENT)
Dept: CARDIOLOGY CLINIC | Age: 70
End: 2020-01-08

## 2020-01-08 VITALS
SYSTOLIC BLOOD PRESSURE: 132 MMHG | OXYGEN SATURATION: 97 % | BODY MASS INDEX: 31.52 KG/M2 | WEIGHT: 208 LBS | DIASTOLIC BLOOD PRESSURE: 82 MMHG | HEIGHT: 68 IN | RESPIRATION RATE: 18 BRPM | HEART RATE: 50 BPM

## 2020-01-08 DIAGNOSIS — I10 ESSENTIAL HYPERTENSION: ICD-10-CM

## 2020-01-08 DIAGNOSIS — I42.1 HOCM (HYPERTROPHIC OBSTRUCTIVE CARDIOMYOPATHY) (HCC): ICD-10-CM

## 2020-01-08 DIAGNOSIS — E78.2 MIXED HYPERLIPIDEMIA: ICD-10-CM

## 2020-01-08 DIAGNOSIS — I48.0 PAROXYSMAL ATRIAL FIBRILLATION (HCC): Primary | ICD-10-CM

## 2020-01-08 NOTE — PROGRESS NOTES
[address] [facility], [place], [address] [facility]  [phone]     Subjective:      Tina Valencia is a 79 y.o. male is here for routine f/u on PAF HTN HLD HOCM. Since his last OV, he has been doing well. Stays active---walks a lot and averages around 91214 steps a day. 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 Denies any exertional cp or colin. His fatigue has also improved ---- the only time he feels tired is when he is not in motion. The patient denies chest pain/ shortness of breath, orthopnea, PND, LE edema, palpitations, syncope, or presyncope.        Patient Active Problem List    Diagnosis Date Noted    Essential hypertension 12/19/2018    A-fib ([address] 10/25/2017    Irregular heart beat 10/12/2017    Sepsis ([address] 08/20/2016    SSS (sick sinus syndrome) ([address] 11/30/2015    Hypertrophic cardiomyopathy ([address] 12/03/2014    Atrial fibrillation ([address] 11/12/2014    HTN (hypertension) 11/12/2014    Mixed hyperlipidemia 11/12/2014      Tahira Real MD  Past Medical History:   Diagnosis Date    Arrhythmia     a fib    Cancer ([address]     skin, throat cancer    G tube feedings ([address]     PEG    High cholesterol     Hypertension     Hypertrophic cardiomyopathy ([address]     per cardiology notes 6/2013    Status post chemoradiation     completed 12/2013    Throat cancer [facility])       Past Surgical History:   Procedure Laterality Date    COLONOSCOPY N/A 1/17/2017    COLONOSCOPY performed by Andreas Keita MD at [facility] ENDOSCOPY    HX HEART CATHETERIZATION  2007    HX ORTHOPAEDIC      Rt. hand surgery    HX OTHER SURGICAL      exc. skin cancer    HX OTHER SURGICAL      peg tube inserted    HX TONSILLECTOMY      GA EGD BALLOON DILATION ESOPHAGUS <30 MM DIAM  11/8/2013          No Known Allergies   Family History   Problem Relation Age of Onset    Heart Attack Mother     Cancer Father       Social History     Socioeconomic History    Marital status:      Spouse name: Not on file    Number of children: Not on file    Years of education: Not on file    Highest education level: Not on file   Occupational History    Not on file   Social Needs    Financial resource strain: Not on file    Food insecurity:     Worry: Not on file     Inability: Not on file    Transportation needs:     Medical: Not on file     Non-medical: Not on file   Tobacco Use    Smoking status: Never Smoker    Smokeless tobacco: Former User   Substance and Sexual Activity    Alcohol use: Yes     Alcohol/week: 0.0 standard drinks     Comment: 2 drinks a week    Drug use: No    Sexual activity: Not Currently   Lifestyle    Physical activity:     Days per week: Not on file     Minutes per session: Not on file    Stress: Not on file   Relationships    Social connections:     Talks on phone: Not on file     Gets together: Not on file     Attends [employer] service: Not on file     Active member of club or organization: Not on file     Attends meetings of clubs or organizations: Not on file     Relationship status: Not on file    Intimate partner violence:     Fear of current or ex partner: Not on file     Emotionally abused: Not on file     Physically abused: Not on file     Forced sexual activity: Not on file   Other Topics Concern    Not on file   Social History Narrative    Not on file      Current Outpatient Medications   Medication Sig    lovastatin (MEVACOR) 40 mg tablet TAKE 1 TABLET DAILY    amLODIPine (NORVASC) 5 mg tablet TAKE 1 TABLET DAILY    aspirin (ASPIRIN) 325 mg tablet Take 325 mg by mouth daily.  potassium chloride (K-DUR, KLOR-CON) 20 mEq tablet TAKE ONE TABLET BY MOUTH TWICE DAILY     levothyroxine (SYNTHROID) 50 mcg tablet Take  by mouth Daily (before breakfast).  cholecalciferol, vitamin D3, (VITAMIN D3) 2,000 unit tab Take 2,000 Units by mouth daily.  Indications: PREVENTION OF VITAMIN D DEFICIENCY    triamterene-hydrochlorothiazide (Arville Rho) 37.5-25 mg per tablet Take  by mouth daily. No current facility-administered medications for this visit. Review of Symptoms:  11 systems reviewed, negative other than as stated in the HPI    Physical ExamPhysical Exam:    Vitals:    01/08/20 0928 01/08/20 0934 01/08/20 0949   BP: 152/90 160/90 132/82   Pulse: (!) 50     Resp: 18     SpO2: 97%     Weight: 208 lb (94.3 kg)     Height: 5' 8\" (1.727 m)       Body mass index is 31.63 kg/m². General PE  Gen:  NAD  Mental Status - Alert. General Appearance - Not in acute distress. HEENT:  PERRL, no carotid bruits or JVD  Chest and Lung Exam   Inspection: Accessory muscles - No use of accessory muscles in breathing. Auscultation:   Breath sounds: - Normal.   Cardiovascular   Inspection: Jugular vein - Bilateral - Inspection Normal.   Palpation/Percussion:   Apical Impulse: - Normal.   Auscultation: Rhythm - Regular. Heart Sounds - S1 WNL and S2 WNL. No S3 or S4. Murmurs & Other Heart Sounds: Auscultation of the heart reveals - No Murmurs. Peripheral Vascular   Upper Extremity: Inspection - Bilateral - No Cyanotic nailbeds or Digital clubbing. Lower Extremity:   Palpation: Edema - Bilateral - No edema. Abdomen:   Soft, non-tender, bowel sounds are active.   Neuro: A&O times 3, CN and motor grossly WNL    Labs:   Lab Results   Component Value Date/Time    Cholesterol, total 177 09/18/2009 09:08 AM    Cholesterol, total 224 (H) 05/18/2009 08:41 AM    HDL Cholesterol 51 09/18/2009 09:08 AM    HDL Cholesterol 61 (H) 05/18/2009 08:41 AM    LDL, calculated 113 (H) 09/18/2009 09:08 AM    LDL, calculated 152.4 (H) 05/18/2009 08:41 AM    Triglyceride 65 09/18/2009 09:08 AM    Triglyceride 53 05/18/2009 08:41 AM    CHOL/HDL Ratio 3.5 09/18/2009 09:08 AM    CHOL/HDL Ratio 3.7 05/18/2009 08:41 AM     Lab Results   Component Value Date/Time    CK 84 08/22/2016 04:06 AM     Lab Results   Component Value Date/Time    Sodium 141 06/14/2018 01:05 PM    Potassium 3.4 (L) 06/14/2018 01:05 PM    Chloride 102 06/14/2018 01:05 PM    CO2 33 (H) 06/14/2018 01:05 PM    Anion gap 6 06/14/2018 01:05 PM    Glucose 100 06/14/2018 01:05 PM    BUN 26 (H) 06/14/2018 01:05 PM    Creatinine 1.21 06/14/2018 01:05 PM    BUN/Creatinine ratio 21 (H) 06/14/2018 01:05 PM    GFR est AA >60 06/14/2018 01:05 PM    GFR est non-AA 60 (L) 06/14/2018 01:05 PM    Calcium 9.1 06/14/2018 01:05 PM    Bilirubin, total 0.5 06/14/2018 01:05 PM    AST (SGOT) 15 06/14/2018 01:05 PM    Alk. phosphatase 52 06/14/2018 01:05 PM    Protein, total 6.8 06/14/2018 01:05 PM    Albumin 3.7 06/14/2018 01:05 PM    Globulin 3.1 06/14/2018 01:05 PM    A-G Ratio 1.2 06/14/2018 01:05 PM    ALT (SGPT) 26 06/14/2018 01:05 PM       EKG:  SB     Assessment:     Assessment:      1. Paroxysmal atrial fibrillation (HCC)    2. Essential hypertension    3. HOCM (hypertrophic obstructive cardiomyopathy) ([facility]    4. Mixed hyperlipidemia        Orders Placed This Encounter    CK    LIPID PANEL    METABOLIC PANEL, COMPREHENSIVE    AMB POC EKG ROUTINE W/ 12 LEADS, INTER & REP     Order Specific Question:   Reason for Exam:     Answer:   routine        Plan:     Pt presents for f/u, doing well and stable from cardiac standpoint. Last seen by us in 12/18. Had 1 episode of AF per device check 8/5/19---2 minutes, maintaining ASA therapy. Since his last OV, he has been doing well. Stays active---walks a lot and averages around 45796 steps a day. 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 Denies any exertional cp or colin. His fatigue has also improved ---- the only time he feels tired is when he is not in motion.     History of paroxysmal atrial fibrillation, hx AF/AFL ablation 10/17  Had 1 episode of AF per device check 8/5/19---2 minutes, maintaining ASA therapy. Holter 9/18: Sinus bradycardia with profound bradycardia to 36 bpm with short burst of PAT and ventricular ectopy.    Off Xarelto and now on ASA Shante Romero    Hyperlipidemia  7/18 LDL 80.  On statin therapy. Check labs     HTN  Controlled with current therapy  Home BP readings 130s/80s     History of hypertrophic cardiomyopathy  Normal EF with no significant valvular pathology per echo 09/18  Stable.         Hx normal nuclear exercise stress test in 1/15.          Continue current care and f/u in 6 months.     Daniel Feliciano NP

## 2020-01-08 NOTE — PROGRESS NOTES
1. Have you been to the ER, urgent care clinic since your last visit? Hospitalized since your last visit? No    2. Have you seen or consulted any other health care providers outside of the [address] [place] [address] since your last visit? Include any pap smears or colon screening.  No

## 2020-01-09 ENCOUNTER — HOSPITAL ENCOUNTER (OUTPATIENT)
Dept: LAB | Age: 70
Discharge: HOME | End: 2020-01-09
Payer: MEDICARE

## 2020-01-09 PROCEDURE — 80053 COMPREHEN METABOLIC PANEL: CPT

## 2020-01-09 PROCEDURE — 36415 COLL VENOUS BLD VENIPUNCTURE: CPT

## 2020-01-09 PROCEDURE — 82550 ASSAY OF CK (CPK): CPT

## 2020-01-09 PROCEDURE — 80061 LIPID PANEL: CPT

## 2020-01-10 LAB
ALBUMIN SERPL-MCNC: 4.1 G/DL (ref 3.5–4.8)
ALBUMIN/GLOB SERPL: 1.6 {RATIO} (ref 1.2–2.2)
ALP SERPL-CCNC: 60 IU/L (ref 39–117)
ALT SERPL-CCNC: 15 IU/L (ref 0–44)
AST SERPL-CCNC: 18 IU/L (ref 0–40)
BILIRUB SERPL-MCNC: 0.6 MG/DL (ref 0–1.2)
BUN SERPL-MCNC: 24 MG/DL (ref 8–27)
BUN/CREAT SERPL: 20 (ref 10–24)
CALCIUM SERPL-MCNC: 9.4 MG/DL (ref 8.6–10.2)
CHLORIDE SERPL-SCNC: 101 MMOL/L (ref 96–106)
CHOLEST SERPL-MCNC: 168 MG/DL (ref 100–199)
CK SERPL-CCNC: 102 U/L (ref 24–204)
CO2 SERPL-SCNC: 29 MMOL/L (ref 20–29)
CREAT SERPL-MCNC: 1.18 MG/DL (ref 0.76–1.27)
GLOBULIN SER CALC-MCNC: 2.5 G/DL (ref 1.5–4.5)
GLUCOSE SERPL-MCNC: 105 MG/DL (ref 65–99)
HDLC SERPL-MCNC: 57 MG/DL
INTERPRETATION, 910389: NORMAL
LDLC SERPL CALC-MCNC: 99 MG/DL (ref 0–99)
POTASSIUM SERPL-SCNC: 3.5 MMOL/L (ref 3.5–5.2)
PROT SERPL-MCNC: 6.6 G/DL (ref 6–8.5)
SODIUM SERPL-SCNC: 146 MMOL/L (ref 134–144)
TRIGL SERPL-MCNC: 58 MG/DL (ref 0–149)
VLDLC SERPL CALC-MCNC: 12 MG/DL (ref 5–40)

## 2020-01-10 NOTE — PROGRESS NOTES
Williams Mathis,    Please call patient and inform I received his labwork. LDL is 99 -- slightly increased from 2018. Would recommend to be strict with diet and increase exercise if possible. Plan to repeat lipids again in 1 year.     Thanks,  [employer]

## 2020-02-17 RX ORDER — LOVASTATIN 40 MG/1
TABLET ORAL
Qty: 90 TAB | Refills: 3 | Status: SHIPPED | OUTPATIENT
Start: 2020-02-17 | End: 2020-12-22

## 2020-03-23 ENCOUNTER — OFFICE VISIT (OUTPATIENT)
Dept: CARDIOLOGY CLINIC | Age: 70
End: 2020-03-23

## 2020-03-23 DIAGNOSIS — I49.5 SSS (SICK SINUS SYNDROME) (HCC): Primary | ICD-10-CM

## 2020-03-23 DIAGNOSIS — Z45.09 ENCOUNTER FOR LOOP RECORDER CHECK: ICD-10-CM

## 2020-04-13 ENCOUNTER — OFFICE VISIT (OUTPATIENT)
Dept: CARDIOLOGY CLINIC | Age: 70
End: 2020-04-13

## 2020-04-13 DIAGNOSIS — I48.91 ATRIAL FIBRILLATION, UNSPECIFIED TYPE (HCC): Primary | ICD-10-CM

## 2020-04-13 DIAGNOSIS — Z45.09 ENCOUNTER FOR LOOP RECORDER CHECK: ICD-10-CM

## 2020-04-13 DIAGNOSIS — I49.5 SSS (SICK SINUS SYNDROME) (HCC): ICD-10-CM

## 2020-06-22 ENCOUNTER — OFFICE VISIT (OUTPATIENT)
Dept: CARDIOLOGY CLINIC | Age: 70
End: 2020-06-22

## 2020-06-22 DIAGNOSIS — I48.0 PAROXYSMAL ATRIAL FIBRILLATION (HCC): Primary | ICD-10-CM

## 2020-06-22 DIAGNOSIS — Z45.09 ENCOUNTER FOR LOOP RECORDER CHECK: ICD-10-CM

## 2020-06-25 ENCOUNTER — OFFICE VISIT (OUTPATIENT)
Dept: CARDIOLOGY CLINIC | Age: 70
End: 2020-06-25

## 2020-06-25 VITALS
HEART RATE: 64 BPM | RESPIRATION RATE: 18 BRPM | BODY MASS INDEX: 31.04 KG/M2 | SYSTOLIC BLOOD PRESSURE: 170 MMHG | HEIGHT: 68 IN | DIASTOLIC BLOOD PRESSURE: 102 MMHG | WEIGHT: 204.8 LBS | OXYGEN SATURATION: 98 %

## 2020-06-25 DIAGNOSIS — I48.0 PAROXYSMAL ATRIAL FIBRILLATION (HCC): Primary | ICD-10-CM

## 2020-06-25 DIAGNOSIS — I10 ESSENTIAL HYPERTENSION: ICD-10-CM

## 2020-06-25 DIAGNOSIS — E78.2 MIXED HYPERLIPIDEMIA: ICD-10-CM

## 2020-06-25 DIAGNOSIS — I49.5 SSS (SICK SINUS SYNDROME) (HCC): ICD-10-CM

## 2020-06-25 RX ORDER — AMLODIPINE BESYLATE 10 MG/1
10 TABLET ORAL DAILY
Qty: 30 TAB | Refills: 0 | Status: SHIPPED | OUTPATIENT
Start: 2020-06-25 | End: 2020-07-14 | Stop reason: SDUPTHER

## 2020-06-25 NOTE — PROGRESS NOTES
Chief Complaint   Patient presents with    Irregular Heart Beat     Overdue for annual follow up - denies cardiac sx      1. Have you been to the ER, urgent care clinic since your last visit? Hospitalized since your last visit? No     2. Have you seen or consulted any other health care providers outside of the [address] [place] [address] since your last visit? Include any pap smears or colon screening.   Yes Dermatologist and Eye

## 2020-06-25 NOTE — PROGRESS NOTES
Subjective:      Arun Aguilera is a 79 y.o. male is here for EP consult. The patient denies chest pain/ shortness of breath, orthopnea, PND, LE edema, palpitations, syncope, presyncope or fatigue. Patient Active Problem List    Diagnosis Date Noted    Essential hypertension 12/19/2018    A-fib ([address] 10/25/2017    Irregular heart beat 10/12/2017    Sepsis ([address] 08/20/2016    SSS (sick sinus syndrome) ([address] 11/30/2015    Hypertrophic cardiomyopathy ([address] 12/03/2014    Atrial fibrillation ([address] 11/12/2014    HTN (hypertension) 11/12/2014    Mixed hyperlipidemia 11/12/2014      Morgan Tom MD  Past Medical History:   Diagnosis Date    Arrhythmia     a fib    Cancer ([address]     skin, throat cancer    G tube feedings ([address]     PEG    High cholesterol     Hypertension     Hypertrophic cardiomyopathy ([address]     per cardiology notes 6/2013    Status post chemoradiation     completed 12/2013    Throat cancer [facility])       Past Surgical History:   Procedure Laterality Date    COLONOSCOPY N/A 1/17/2017    COLONOSCOPY performed by Nery Barber [facility] at [facility] ENDOSCOPY    HX HEART CATHETERIZATION  2007    HX ORTHOPAEDIC      Rt. hand surgery    HX OTHER SURGICAL      exc. skin cancer    HX OTHER SURGICAL      peg tube inserted    HX TONSILLECTOMY      [place] EGD BALLOON DILATION ESOPHAGUS <30 MM DIAM  11/8/2013          No Known Allergies   Family History   Problem Relation Age of Onset    Heart Attack Mother     Cancer Father     negative for cardiac disease  Social History     Socioeconomic History    Marital status:      Spouse name: Not on file    Number of children: Not on file    Years of education: Not on file    Highest education level: Not on file   Tobacco Use    Smoking status: Never Smoker    Smokeless tobacco: Former User   Substance and Sexual Activity    Alcohol use:  Yes     Alcohol/week: 0.0 standard drinks     Comment: 2 drinks a week    Drug use: No    Sexual activity: Not Currently     Current Outpatient Medications   Medication Sig    amLODIPine (NORVASC) 10 mg tablet Take 1 Tab by mouth daily for 30 days.  lovastatin (MEVACOR) 40 mg tablet TAKE 1 TABLET DAILY    aspirin (ASPIRIN) 325 mg tablet Take 325 mg by mouth daily.  potassium chloride (K-DUR, KLOR-CON) 20 mEq tablet TAKE ONE TABLET BY MOUTH TWICE DAILY     levothyroxine (SYNTHROID) 50 mcg tablet Take  by mouth Daily (before breakfast).  cholecalciferol, vitamin D3, (VITAMIN D3) 2,000 unit tab Take 2,000 Units by mouth daily. Indications: PREVENTION OF VITAMIN D DEFICIENCY    triamterene-hydrochlorothiazide (MAXZIDE) 37.5-25 mg per tablet Take  by mouth daily. No current facility-administered medications for this visit. Vitals:    06/25/20 1118 06/25/20 1119   BP: (!) 166/100 (!) 170/102   Pulse: 64 64   Resp: 18    SpO2: 98%    Weight: 204 lb 12.8 oz (92.9 kg)    Height: 5' 8\" (1.727 m)        I have reviewed the nurses notes, vitals, problem list, allergy list, medical history, family, social history and medications. Review of Symptoms:    General: Pt denies excessive weight gain or loss. Pt is able to conduct ADL's  HEENT: Denies blurred vision, headaches, hearing loss, epistaxis and difficulty swallowing. Respiratory: Denies cough, congestion, shortness of breath, DUMONT, wheezing or stridor. Cardiovascular: Denies precordial pain, palpitations, edema or PND  Gastrointestinal: Denies poor appetite, indigestion, abdominal pain or blood in stool  Genitourinary: Denies hematuria, dysuria, increased urinary frequency  Musculoskeletal: Denies joint pain or swelling from muscles or joints  Neurologic: Denies tremor, paresthesias, headache, or sensory motor disturbance  Psychiatric: Denies confusion, insomnia, depression  Integumentray: Denies rash, itching or ulcers.   Hematologic: Denies easy bruising, bleeding    Physical Exam:      General: Well developed, in no acute distress. HEENT: Eyes - PERRL, no jvd  Heart:  Normal S1/S2 negative S3 or S4. Regular, no murmur, gallop or rub. Respiratory: Clear bilaterally x 4, no wheezing or rales  Abdomen:   Soft, non-tender, bowel sounds are active. Extremities:  No edema, normal cap refill, no cyanosis. Musculoskeletal: No clubbing  Neuro: A&Ox3, speech clear, gait stable. Skin: Skin color is normal. No rashes or lesions. Non diaphoretic  Vascular: 2+ pulses symmetric in all extremities    Cardiographics    Ekg: s jonathon, lvh with strain    ilr - wnl    Results for orders placed or performed in visit on 09/04/18   CARDIAC HOLTER MONITOR, 24 HOURS    Narrative    ECG Monitor/24 hours, Complete    Reason for Holter Monitor  BRADYCARDIA    Heartbeat    Slowest 37  Average 58  Fastest  118      Results:   Underlying Rhythm: Sinus bradycardia      Atrial Arrhythmias: premature atrial contractions; occasional, atrial couplets, paroxysmal supraventricular tachycardia and severe bradycardia            AV Conduction: normal    Ventricular Arrhythmias: premature ventricular contractions; occasional and ventricular couplets     ST Segment Analysis:normal     Symptom Correlation:  none    Comment:   Sinus bradycardia with profound bradycardia to 36 bpm with short burst of PAT and ventricular ectopy.  Clinical correlation advised     Haroldo Tyler MD, Tanya Said      Results for orders placed or performed during the hospital encounter of 10/25/17   EKG, 12 LEAD, INITIAL   Result Value Ref Range    Ventricular Rate 69 BPM    Atrial Rate 69 BPM    P-R Interval 170 ms    QRS Duration 88 ms    Q-T Interval 436 ms    QTC Calculation (Bezet) 467 ms    Calculated P Axis 75 degrees    Calculated R Axis 53 degrees    Calculated T Axis -148 degrees    Diagnosis       Normal sinus rhythm  Left ventricular hypertrophy with repolarization abnormality  When compared with ECG of 05-OCT-2017 10:23,  ST more depressed in Inferior leads  ST now depressed in Anterior leads  Confirmed by Edilma Wright ([id number]) on 10/26/2017 3:07:29 PM           Lab Results   Component Value Date/Time    WBC 4.6 06/14/2018 01:05 PM    HGB 14.6 06/14/2018 01:05 PM    HCT 41.5 06/14/2018 01:05 PM    PLATELET 406 29/49/4919 01:05 PM    MCV 92.6 06/14/2018 01:05 PM      Lab Results   Component Value Date/Time    Sodium 146 (H) 01/09/2020 10:34 AM    Potassium 3.5 01/09/2020 10:34 AM    Chloride 101 01/09/2020 10:34 AM    CO2 29 01/09/2020 10:34 AM    Anion gap 6 06/14/2018 01:05 PM    Glucose 105 (H) 01/09/2020 10:34 AM    BUN 24 01/09/2020 10:34 AM    Creatinine 1.18 01/09/2020 10:34 AM    BUN/Creatinine ratio 20 01/09/2020 10:34 AM    GFR est AA 72 01/09/2020 10:34 AM    GFR est non-AA 62 01/09/2020 10:34 AM    Calcium 9.4 01/09/2020 10:34 AM    Bilirubin, total 0.6 01/09/2020 10:34 AM    Alk. phosphatase 60 01/09/2020 10:34 AM    Protein, total 6.6 01/09/2020 10:34 AM    Albumin 4.1 01/09/2020 10:34 AM    Globulin 3.1 06/14/2018 01:05 PM    A-G Ratio 1.6 01/09/2020 10:34 AM    ALT (SGPT) 15 01/09/2020 10:34 AM         Assessment:     Assessment:        ICD-10-CM ICD-9-CM    1. Paroxysmal atrial fibrillation (HCC) I48.0 427.31 AMB POC EKG ROUTINE W/ 12 LEADS, INTER & REP      amLODIPine (NORVASC) 10 mg tablet   2. SSS (sick sinus syndrome) (HCC) I49.5 427.81 amLODIPine (NORVASC) 10 mg tablet   3. Mixed hyperlipidemia E78.2 272.2 amLODIPine (NORVASC) 10 mg tablet   4. Essential hypertension I10 401.9 amLODIPine (NORVASC) 10 mg tablet     Orders Placed This Encounter    AMB POC EKG ROUTINE W/ 12 LEADS, INTER & REP     Order Specific Question:   Reason for Exam:     Answer:   routine    amLODIPine (NORVASC) 10 mg tablet     Sig: Take 1 Tab by mouth daily for 30 days. Dispense:  30 Tab     Refill:  0        Plan:   Maddison Tavares is in sinus sp af abl. He is htn and I will increase his norvasc. Not a candidate for av concha agents due to bradycardia. Cont med rx for hyperlipidemia. Asked him to f/u with Dr Elizabeth Mullen in a couple of weeks with regards to his htn and with me in one year    Continue medical management for AF, htn and hyperlipidemia. Thank you for allowing me to participate in Maddison Tavares 's care.     Akiko Russell MD, Dorian Anderson

## 2020-06-25 NOTE — LETTER
6/25/20 Patient: Shyam Beyer YOB: 1950 Date of Visit: 6/25/2020 Jordy Massey, [address] [facility] [address] [facility] [address] VIA Facsimile: [phone] Dear Jordy Massey MD, Thank you for referring Mr. Aidan Ferreira to [address] [place] [address] [place] for evaluation. My notes for this consultation are attached. If you have questions, please do not hesitate to call me. I look forward to following your patient along with you. Sincerely, Mary Lou Stahl MD

## 2020-07-14 ENCOUNTER — OFFICE VISIT (OUTPATIENT)
Dept: CARDIOLOGY CLINIC | Age: 70
End: 2020-07-14

## 2020-07-14 VITALS
HEART RATE: 62 BPM | HEIGHT: 68 IN | WEIGHT: 204 LBS | RESPIRATION RATE: 16 BRPM | BODY MASS INDEX: 30.92 KG/M2 | DIASTOLIC BLOOD PRESSURE: 100 MMHG | OXYGEN SATURATION: 95 % | SYSTOLIC BLOOD PRESSURE: 172 MMHG

## 2020-07-14 DIAGNOSIS — I48.0 PAROXYSMAL ATRIAL FIBRILLATION (HCC): Primary | ICD-10-CM

## 2020-07-14 DIAGNOSIS — I42.2 HYPERTROPHIC CARDIOMYOPATHY (HCC): ICD-10-CM

## 2020-07-14 DIAGNOSIS — I10 ESSENTIAL HYPERTENSION: ICD-10-CM

## 2020-07-14 DIAGNOSIS — Z95.818 STATUS POST PLACEMENT OF IMPLANTABLE LOOP RECORDER: ICD-10-CM

## 2020-07-14 DIAGNOSIS — E78.2 MIXED HYPERLIPIDEMIA: ICD-10-CM

## 2020-07-14 RX ORDER — LOSARTAN POTASSIUM 50 MG/1
50 TABLET ORAL DAILY
Qty: 30 TAB | Refills: 1 | Status: SHIPPED | OUTPATIENT
Start: 2020-07-14 | End: 2020-08-05 | Stop reason: SDUPTHER

## 2020-07-14 RX ORDER — AMLODIPINE BESYLATE 10 MG/1
TABLET ORAL
Qty: 90 TAB | Refills: 1 | Status: SHIPPED | OUTPATIENT
Start: 2020-07-14 | End: 2020-08-05 | Stop reason: SDUPTHER

## 2020-07-14 NOTE — PROGRESS NOTES
1. Have you been to the ER, urgent care clinic since your last visit? Hospitalized since your last visit? No    2. Have you seen or consulted any other health care providers outside of the [address] [place] [address] since your last visit? Include any pap smears or colon screening. No    Pt reports higher than normal BP readings. Highest in the 170-180s/100.   Increased amlodipine from 5 mg daily to 10 mg daily at [address] [facility] with Dr. Marcia Bernstein

## 2020-07-14 NOTE — LETTER
7/14/20 Patient: Meli Waggoner YOB: 1950 Date of Visit: 7/14/2020 Tamara Campuzano, [address] [facility] 103 [facility] 7 [address] VIA Facsimile: [phone] Dear Tamara Campuzano MD, Thank you for referring Mr. Josef Garces to [address] [place] [address] for evaluation. My notes for this consultation are attached. If you have questions, please do not hesitate to call me. I look forward to following your patient along with you. Sincerely, Tata Elder MD

## 2020-08-05 ENCOUNTER — OFFICE VISIT (OUTPATIENT)
Dept: CARDIOLOGY CLINIC | Age: 70
End: 2020-08-05
Payer: MEDICARE

## 2020-08-05 DIAGNOSIS — Z45.09 ENCOUNTER FOR LOOP RECORDER CHECK: ICD-10-CM

## 2020-08-05 DIAGNOSIS — I48.0 PAROXYSMAL ATRIAL FIBRILLATION (HCC): Primary | ICD-10-CM

## 2020-08-05 PROCEDURE — 93293 PM PHONE R-STRIP DEVICE EVAL: CPT | Performed by: INTERNAL MEDICINE

## 2020-08-11 ENCOUNTER — HOSPITAL ENCOUNTER (OUTPATIENT)
Dept: LAB | Age: 70
Discharge: HOME | End: 2020-08-11
Payer: MEDICARE

## 2020-08-11 PROCEDURE — 80048 BASIC METABOLIC PNL TOTAL CA: CPT

## 2020-08-11 PROCEDURE — 36415 COLL VENOUS BLD VENIPUNCTURE: CPT

## 2020-08-12 LAB
BUN SERPL-MCNC: 23 MG/DL (ref 8–27)
BUN/CREAT SERPL: 17 (ref 10–24)
CALCIUM SERPL-MCNC: 10.1 MG/DL (ref 8.6–10.2)
CHLORIDE SERPL-SCNC: 102 MMOL/L (ref 96–106)
CO2 SERPL-SCNC: 34 MMOL/L (ref 20–29)
CREAT SERPL-MCNC: 1.35 MG/DL (ref 0.76–1.27)
GLUCOSE SERPL-MCNC: 88 MG/DL (ref 65–99)
INTERPRETATION: NORMAL
POTASSIUM SERPL-SCNC: 4.2 MMOL/L (ref 3.5–5.2)
SODIUM SERPL-SCNC: 150 MMOL/L (ref 134–144)

## 2020-08-12 NOTE — PROGRESS NOTES
Spoke to patient using 2 identifiers. Per Zakia Mtz NP, patient was made aware that labwork shows kidney function is stable since starting losartan. Continue meds for now, no changes.  Keep f/u with Dr. Michelle Graves as scheduled  Patient stated his systolic pressure is around 160s. Diastolic around mid-upper 80s. He will come to office for next visit on 8/14/20.

## 2020-08-12 NOTE — PROGRESS NOTES
Kusum,    Please call patient and inform that June Garnett shows kidney function is stable since starting losartan. Please see if BP is better controlled. Continue meds for now, no changes. Keep f/u with Dr. Subhash Hauser as scheduled.     Thanks,  [employer]

## 2020-08-14 ENCOUNTER — OFFICE VISIT (OUTPATIENT)
Dept: CARDIOLOGY CLINIC | Age: 70
End: 2020-08-14
Payer: MEDICARE

## 2020-08-14 VITALS
WEIGHT: 206.6 LBS | RESPIRATION RATE: 18 BRPM | OXYGEN SATURATION: 96 % | HEIGHT: 68 IN | SYSTOLIC BLOOD PRESSURE: 138 MMHG | BODY MASS INDEX: 31.31 KG/M2 | DIASTOLIC BLOOD PRESSURE: 90 MMHG | HEART RATE: 76 BPM

## 2020-08-14 DIAGNOSIS — I10 ESSENTIAL HYPERTENSION: ICD-10-CM

## 2020-08-14 DIAGNOSIS — E78.2 MIXED HYPERLIPIDEMIA: ICD-10-CM

## 2020-08-14 DIAGNOSIS — I48.0 PAROXYSMAL ATRIAL FIBRILLATION (HCC): ICD-10-CM

## 2020-08-14 DIAGNOSIS — I10 ESSENTIAL HYPERTENSION: Primary | ICD-10-CM

## 2020-08-14 DIAGNOSIS — I42.2 HYPERTROPHIC CARDIOMYOPATHY (HCC): ICD-10-CM

## 2020-08-14 DIAGNOSIS — Z95.818 STATUS POST PLACEMENT OF IMPLANTABLE LOOP RECORDER: ICD-10-CM

## 2020-08-14 PROCEDURE — G8417 CALC BMI ABV UP PARAM F/U: HCPCS | Performed by: INTERNAL MEDICINE

## 2020-08-14 PROCEDURE — G8752 SYS BP LESS 140: HCPCS | Performed by: INTERNAL MEDICINE

## 2020-08-14 PROCEDURE — 99213 OFFICE O/P EST LOW 20 MIN: CPT | Performed by: INTERNAL MEDICINE

## 2020-08-14 PROCEDURE — G8427 DOCREV CUR MEDS BY ELIG CLIN: HCPCS | Performed by: INTERNAL MEDICINE

## 2020-08-14 PROCEDURE — G8536 NO DOC ELDER MAL SCRN: HCPCS | Performed by: INTERNAL MEDICINE

## 2020-08-14 PROCEDURE — G8510 SCR DEP NEG, NO PLAN REQD: HCPCS | Performed by: INTERNAL MEDICINE

## 2020-08-14 PROCEDURE — 1101F PT FALLS ASSESS-DOCD LE1/YR: CPT | Performed by: INTERNAL MEDICINE

## 2020-08-14 PROCEDURE — 3017F COLORECTAL CA SCREEN DOC REV: CPT | Performed by: INTERNAL MEDICINE

## 2020-08-14 PROCEDURE — G8755 DIAS BP > OR = 90: HCPCS | Performed by: INTERNAL MEDICINE

## 2020-08-14 RX ORDER — LOSARTAN POTASSIUM 50 MG/1
50 TABLET ORAL 2 TIMES DAILY
Qty: 180 TAB | Refills: 3 | Status: SHIPPED | OUTPATIENT
Start: 2020-08-14 | End: 2020-09-28

## 2020-08-14 NOTE — LETTER
8/14/20 Patient: Shine Ingram YOB: 1950 Date of Visit: 8/14/2020 Rosenda Cardoso, [address] [facility] 103 [facility] 7 [address] VIA Facsimile: [phone] Dear Rosenda Cardoso MD, Thank you for referring Mr. Aida Arias to [address] [place] [address] for evaluation. My notes for this consultation are attached. If you have questions, please do not hesitate to call me. I look forward to following your patient along with you. Sincerely, Christine Whitney MD

## 2020-08-14 NOTE — PROGRESS NOTES
Chief Complaint   Patient presents with    Hypertension     One month follow up for BP check -    Leg Swelling     luann' lower legs and feet      1. Have you been to the ER, urgent care clinic since your last visit? Hospitalized since your last visit? No     2. Have you seen or consulted any other health care providers outside of the [address] [place] [address] since your last visit? Include any pap smears or colon screening.   No

## 2020-08-14 NOTE — PROGRESS NOTES
Joshua Petersen, [facility]    Subjective/HPI:     He Mary is a 79 y.o. male is here for BP f/u. He has a PMHx of PAF s/p ablation with ILR, HTN, HLD and hx of hypertrophic cardiomyopathy. At last visit, he had losartan added to his BP regimen. BP remains 160s/80s at home. He has been checking BP at night time, but takes meds in the AM.  He has no had ill effects since starting losartan. PCP Provider  Bridget Garcia MD    Past Medical History:   Diagnosis Date    Arrhythmia     a fib    [facility] ([facility]     skin, throat cancer    G tube feedings ([facility]     PEG    High cholesterol     Hypertension     Hypertrophic cardiomyopathy ([facility]     per cardiology notes 6/2013    Status post chemoradiation     completed 12/2013    Throat cancer [facility])         No Known Allergies     Outpatient Encounter Medications as of 8/14/2020   Medication Sig Dispense Refill    amLODIPine (NORVASC) 10 mg tablet Take one tablet by mouth once daily. 90 Tab 3    losartan (COZAAR) 50 mg tablet Take 1 Tab by mouth daily. 90 Tab 3    lovastatin (MEVACOR) 40 mg tablet TAKE 1 TABLET DAILY 90 Tab 3    aspirin (ASPIRIN) 325 mg tablet Take 325 mg by mouth daily.  potassium chloride (K-DUR, KLOR-CON) 20 mEq tablet TAKE ONE TABLET BY MOUTH TWICE DAILY  180 Tab 0    levothyroxine (SYNTHROID) 50 mcg tablet Take  by mouth Daily (before breakfast).  cholecalciferol, vitamin D3, (VITAMIN D3) 2,000 unit tab Take 2,000 Units by mouth daily. Indications: PREVENTION OF VITAMIN D DEFICIENCY      triamterene-hydrochlorothiazide (MAXZIDE) 37.5-25 mg per tablet Take  by mouth daily. No facility-administered encounter medications on file as of 8/14/2020. Review of Symptoms:    Review of Systems   Constitutional: Negative for chills, fever and weight loss. HENT: Negative for nosebleeds. Eyes: Negative for blurred vision and double vision.    Respiratory: Negative for cough, shortness of breath and wheezing. Cardiovascular: Negative for chest pain, palpitations, orthopnea, leg swelling and PND. Gastrointestinal: Negative for abdominal pain, blood in stool, diarrhea, nausea and vomiting. Musculoskeletal: Negative for joint pain. Skin: Negative for rash. Neurological: Negative for dizziness, tingling and loss of consciousness. Endo/Heme/Allergies: Does not bruise/bleed easily. Physical Exam:      General: Well developed, in no acute distress, cooperative and alert  HEENT: No carotid bruits, no JVD, trach is midline. Neck Supple, PEERL, EOM intact. Heart:  reg rate and rhythm; normal S1/S2; no murmurs, no gallops or rubs. Respiratory: Clear bilaterally x 4, no wheezing or rales  Abdomen:   Soft, non-tender, no distention, no masses. + BS. Extremities:  Normal cap refill, no cyanosis, atraumatic. Trace pitting edema BLE. Neuro: A&Ox3, speech clear, gait stable. Skin: Skin color is normal. No rashes or lesions. Non diaphoretic  Vascular: 2+ pulses symmetric in all extremities    There were no vitals taken for this visit.     ECG: sinus rhythm; LVH    Cardiology Labs:    Lab Results   Component Value Date/Time    Cholesterol, total 168 01/09/2020 10:34 AM    HDL Cholesterol 57 01/09/2020 10:34 AM    LDL, calculated 99 01/09/2020 10:34 AM    LDL, calculated 113 (H) 09/18/2009 09:08 AM    LDL, calculated 152.4 (H) 05/18/2009 08:41 AM    VLDL, calculated 12 01/09/2020 10:34 AM    CHOL/HDL Ratio 3.5 09/18/2009 09:08 AM       No results found for: HBA1C, IGN5LOMB, SME2NCSE, LBY5YNPL    Lab Results   Component Value Date/Time    Sodium 150 (H) 08/11/2020 11:18 AM    Potassium 4.2 08/11/2020 11:18 AM    Chloride 102 08/11/2020 11:18 AM    CO2 34 (H) 08/11/2020 11:18 AM    Glucose 88 08/11/2020 11:18 AM    BUN 23 08/11/2020 11:18 AM    Creatinine 1.35 (H) 08/11/2020 11:18 AM    BUN/Creatinine ratio 17 08/11/2020 11:18 AM    GFR est AA 61 08/11/2020 11:18 AM    GFR est non-AA 53 (L) 08/11/2020 11:18 AM    Calcium 10.1 08/11/2020 11:18 AM    Anion gap 6 06/14/2018 01:05 PM    Bilirubin, total 0.6 01/09/2020 10:34 AM    ALT (SGPT) 15 01/09/2020 10:34 AM    Alk. phosphatase 60 01/09/2020 10:34 AM    Protein, total 6.6 01/09/2020 10:34 AM    Albumin 4.1 01/09/2020 10:34 AM    Globulin 3.1 06/14/2018 01:05 PM    A-G Ratio 1.6 01/09/2020 10:34 AM          Assessment:       ICD-10-CM ICD-9-CM    1. Essential hypertension  I10 401.9    2. Paroxysmal atrial fibrillation (HCC)  I48.0 427.31    3. Hypertrophic cardiomyopathy (HCC)  I42.2 425.18    4. Mixed hyperlipidemia  E78.2 272.2    5. Status post placement of implantable loop recorder  Z95.818 V45.09         Plan:     1. Essential hypertension  BP remains suboptimal at home  Further increase losartan 50 mg BID; repeat BMP in 2 weeks as serum Cr was at upper limits of normal since losartan added. 2. Paroxysmal atrial fibrillation ([facility]  S/p AF/ AFL ablation in 10/2017  Maintaining sinus rhythm; hold BB therapy due to baseline bradycardia  Off OAC and maintaining ASA only    3. Hypertrophic cardiomyopathy ([address]  Echo done in 9/2018 with preserved ejection fraction 55-60% with no evidence of hypertrophic myocardium  Continue present medical management    4. Mixed hyperlipidemia  LDL 99 in 1/2020  Continue statin therapy and low fat, low cholesterol diet  Lipids managed by PCP    5. S/p implantable loop recorder  Continue with routine follow-up with Dr. Amalia Lobo    F/u with NP in 1 month. Cary Riggs NP       [address] [facility] Cardiology    8/14/2020         Patient seen, examined by me personally. Plan discussed as detailed. Agree with note as outlined by  NP. I confirm findings in history and physical exam. No additional findings noted. Agree with plan as outlined above.      Dinah Miller MD

## 2020-08-28 DIAGNOSIS — I10 ESSENTIAL HYPERTENSION: ICD-10-CM

## 2020-09-14 ENCOUNTER — TELEPHONE (OUTPATIENT)
Dept: CARDIOLOGY CLINIC | Age: 70
End: 2020-09-14

## 2020-09-14 NOTE — TELEPHONE ENCOUNTER
Pt is scheduled to see Dr. Kirsten Hays. 9/28/20, does he need to have blood work done prior to appointment. Please call.     Thanks

## 2020-09-18 ENCOUNTER — OFFICE VISIT (OUTPATIENT)
Dept: CARDIOLOGY CLINIC | Age: 70
End: 2020-09-18
Payer: MEDICARE

## 2020-09-18 DIAGNOSIS — I48.0 PAROXYSMAL ATRIAL FIBRILLATION (HCC): Primary | ICD-10-CM

## 2020-09-18 DIAGNOSIS — Z45.09 ENCOUNTER FOR LOOP RECORDER CHECK: ICD-10-CM

## 2020-09-18 PROCEDURE — 93298 REM INTERROG DEV EVAL SCRMS: CPT | Performed by: INTERNAL MEDICINE

## 2020-09-24 PROBLEM — I49.9 IRREGULAR HEART BEAT: Status: RESOLVED | Noted: 2017-10-12 | Resolved: 2020-09-24

## 2020-09-24 NOTE — PROGRESS NOTES
Subjective/HPI:     Imtiaz Crespo is a 79 y.o. male is here for BP f/u. He has a PMHx of PAF s/p ablation with ILR, HTN, HLD and hx of hypertrophic cardiomyopathy. At last visit, losartan was further increased for BP management. This had made his creatinine to go up. Losartan is on hold. Home readings are up to 972 systolic. PCP Provider  Deny Diego MD    Past Medical History:   Diagnosis Date    Arrhythmia     a fib    [facility] ([facility]     skin, throat cancer    G tube feedings ([facility]     PEG    High cholesterol     Hypertension     Hypertrophic cardiomyopathy ([facility]     per cardiology notes 6/2013    Status post chemoradiation     completed 12/2013    Throat cancer [facility])         No Known Allergies     Outpatient Encounter Medications as of 9/28/2020   Medication Sig Dispense Refill    minoxidiL (LONITEN) 10 mg tablet Take 0.5 Tabs by mouth daily. 30 Tab 3    losartan (COZAAR) 50 mg tablet Take 0.5 Tabs by mouth daily. 180 Tab 0    amLODIPine (NORVASC) 10 mg tablet Take one tablet by mouth once daily. 90 Tab 3    lovastatin (MEVACOR) 40 mg tablet TAKE 1 TABLET DAILY 90 Tab 3    aspirin (ASPIRIN) 325 mg tablet Take 325 mg by mouth daily.  potassium chloride (K-DUR, KLOR-CON) 20 mEq tablet TAKE ONE TABLET BY MOUTH TWICE DAILY  180 Tab 0    levothyroxine (SYNTHROID) 50 mcg tablet Take  by mouth Daily (before breakfast).  cholecalciferol, vitamin D3, (VITAMIN D3) 2,000 unit tab Take 2,000 Units by mouth daily. Indications: PREVENTION OF VITAMIN D DEFICIENCY      triamterene-hydrochlorothiazide (MAXZIDE) 37.5-25 mg per tablet Take  by mouth daily.  [DISCONTINUED] losartan (COZAAR) 50 mg tablet Take 1 Tab by mouth two (2) times a day. 180 Tab 3     No facility-administered encounter medications on file as of 9/28/2020. Review of Symptoms:    Review of Systems   Constitutional: Negative for chills, fever and weight loss.    HENT: Negative for nosebleeds. Eyes: Negative for blurred vision and double vision. Respiratory: Negative for cough, shortness of breath and wheezing. Cardiovascular: Negative for chest pain, palpitations, orthopnea, leg swelling and PND. Gastrointestinal: Negative for abdominal pain, blood in stool, diarrhea, nausea and vomiting. Musculoskeletal: Negative for joint pain. Skin: Negative for rash. Neurological: Negative for dizziness, tingling and loss of consciousness. Endo/Heme/Allergies: Does not bruise/bleed easily. Physical Exam:      General: Well developed, in no acute distress, cooperative and alert  HEENT: No carotid bruits, no JVD, trach is midline. Neck Supple, PEERL, EOM intact. Heart:  reg rate and rhythm; normal S1/S2; no murmurs, no gallops or rubs. Respiratory: Clear bilaterally x 4, no wheezing or rales  Abdomen:   Soft, non-tender, no distention, no masses. + BS. Extremities:  Normal cap refill, no cyanosis, atraumatic. Trace pitting edema BLE. Neuro: A&Ox3, speech clear, gait stable. Skin: Skin color is normal. No rashes or lesions.  Non diaphoretic  Vascular: 2+ pulses symmetric in all extremities    Visit Vitals  BP (!) 142/84 (BP 1 Location: Right arm, BP Patient Position: Sitting)   Pulse 63   Resp 18   Ht 5' 8\" (1.727 m)   Wt 208 lb 3.2 oz (94.4 kg)   SpO2 97%   BMI 31.66 kg/m²       Cardiology Labs:    Lab Results   Component Value Date/Time    Cholesterol, total 168 01/09/2020 10:34 AM    HDL Cholesterol 57 01/09/2020 10:34 AM    LDL, calculated 99 01/09/2020 10:34 AM    LDL, calculated 113 (H) 09/18/2009 09:08 AM    LDL, calculated 152.4 (H) 05/18/2009 08:41 AM    VLDL, calculated 12 01/09/2020 10:34 AM    CHOL/HDL Ratio 3.5 09/18/2009 09:08 AM       No results found for: HBA1C, CVE1LVSC, MBD5HLBQ, TVA8PYXK    Lab Results   Component Value Date/Time    Sodium 148 (H) 09/25/2020 10:19 AM    Potassium 3.0 (L) 09/25/2020 10:19 AM    Chloride 102 09/25/2020 10:19 AM    CO2 29 09/25/2020 10:19 AM    Glucose 123 (H) 09/25/2020 10:19 AM    BUN 32 (H) 09/25/2020 10:19 AM    Creatinine 1.56 (H) 09/25/2020 10:19 AM    BUN/Creatinine ratio 21 09/25/2020 10:19 AM    GFR est AA 51 (L) 09/25/2020 10:19 AM    GFR est non-AA 44 (L) 09/25/2020 10:19 AM    Calcium 9.4 09/25/2020 10:19 AM    Anion gap 6 06/14/2018 01:05 PM    Bilirubin, total 0.6 01/09/2020 10:34 AM    ALT (SGPT) 15 01/09/2020 10:34 AM    Alk. phosphatase 60 01/09/2020 10:34 AM    Protein, total 6.6 01/09/2020 10:34 AM    Albumin 4.1 01/09/2020 10:34 AM    Globulin 3.1 06/14/2018 01:05 PM    A-G Ratio 1.6 01/09/2020 10:34 AM          Assessment:       ICD-10-CM ICD-9-CM    1. Essential hypertension  I10 401.9 AMB POC EKG ROUTINE W/ 12 LEADS, INTER & REP      METABOLIC PANEL, BASIC   2. Paroxysmal atrial fibrillation (HCC)  I48.0 427.31 AMB POC EKG ROUTINE W/ 12 LEADS, INTER & REP   3. Hypertrophic cardiomyopathy (HCC)  I42.2 425.18    4. Mixed hyperlipidemia  E78.2 272.2    5. Status post placement of implantable loop recorder  Z95.818 V45.09         Plan:     1. Essential hypertension  BP not controlled. Continue anti-hypertensive therapy and low sodium diet. Will add minoxidil. Follow renal function. Consider discontinuing  maxzide if  Renal function does not improve. 2. Paroxysmal atrial fibrillation ([address]  S/p AF/ AFL ablation in 10/2017  Maintaining sinus rhythm; hold BB therapy due to baseline bradycardia  Maintaining ASA only    3. Hypertrophic cardiomyopathy ([address]  Echo done in 9/2018 with preserved ejection fraction 55-60% with no evidence of hypertrophic myocardium  Continue present medical management    4. Mixed hyperlipidemia  LDL 99 in 1/2020  Continue statin therapy and low fat, low cholesterol diet  Lipids managed by PCP    5.   S/p implantable loop recorder  Continue with routine follow-up with Dr. Mir Tavera    F/u with Dr. Shyla Block in one months    Arti Bragg MD

## 2020-09-25 ENCOUNTER — HOSPITAL ENCOUNTER (OUTPATIENT)
Dept: LAB | Age: 70
Discharge: HOME | End: 2020-09-25
Payer: MEDICARE

## 2020-09-25 PROCEDURE — 36415 COLL VENOUS BLD VENIPUNCTURE: CPT

## 2020-09-25 PROCEDURE — 80048 BASIC METABOLIC PNL TOTAL CA: CPT

## 2020-09-26 LAB
BUN SERPL-MCNC: 32 MG/DL (ref 8–27)
BUN/CREAT SERPL: 21 (ref 10–24)
CALCIUM SERPL-MCNC: 9.4 MG/DL (ref 8.6–10.2)
CHLORIDE SERPL-SCNC: 102 MMOL/L (ref 96–106)
CO2 SERPL-SCNC: 29 MMOL/L (ref 20–29)
CREAT SERPL-MCNC: 1.56 MG/DL (ref 0.76–1.27)
GLUCOSE SERPL-MCNC: 123 MG/DL (ref 65–99)
INTERPRETATION: NORMAL
POTASSIUM SERPL-SCNC: 3 MMOL/L (ref 3.5–5.2)
SODIUM SERPL-SCNC: 148 MMOL/L (ref 134–144)

## 2020-09-28 ENCOUNTER — OFFICE VISIT (OUTPATIENT)
Dept: CARDIOLOGY CLINIC | Age: 70
End: 2020-09-28
Payer: MEDICARE

## 2020-09-28 VITALS
WEIGHT: 208.2 LBS | SYSTOLIC BLOOD PRESSURE: 142 MMHG | OXYGEN SATURATION: 97 % | DIASTOLIC BLOOD PRESSURE: 84 MMHG | RESPIRATION RATE: 18 BRPM | HEART RATE: 63 BPM | BODY MASS INDEX: 31.55 KG/M2 | HEIGHT: 68 IN

## 2020-09-28 DIAGNOSIS — I42.2 HYPERTROPHIC CARDIOMYOPATHY (HCC): ICD-10-CM

## 2020-09-28 DIAGNOSIS — I10 ESSENTIAL HYPERTENSION: Primary | ICD-10-CM

## 2020-09-28 DIAGNOSIS — E78.2 MIXED HYPERLIPIDEMIA: ICD-10-CM

## 2020-09-28 DIAGNOSIS — Z95.818 STATUS POST PLACEMENT OF IMPLANTABLE LOOP RECORDER: ICD-10-CM

## 2020-09-28 DIAGNOSIS — I48.0 PAROXYSMAL ATRIAL FIBRILLATION (HCC): ICD-10-CM

## 2020-09-28 PROCEDURE — G8417 CALC BMI ABV UP PARAM F/U: HCPCS | Performed by: INTERNAL MEDICINE

## 2020-09-28 PROCEDURE — 3017F COLORECTAL CA SCREEN DOC REV: CPT | Performed by: INTERNAL MEDICINE

## 2020-09-28 PROCEDURE — G8754 DIAS BP LESS 90: HCPCS | Performed by: INTERNAL MEDICINE

## 2020-09-28 PROCEDURE — G8510 SCR DEP NEG, NO PLAN REQD: HCPCS | Performed by: INTERNAL MEDICINE

## 2020-09-28 PROCEDURE — 1101F PT FALLS ASSESS-DOCD LE1/YR: CPT | Performed by: INTERNAL MEDICINE

## 2020-09-28 PROCEDURE — 99214 OFFICE O/P EST MOD 30 MIN: CPT | Performed by: INTERNAL MEDICINE

## 2020-09-28 PROCEDURE — 93005 ELECTROCARDIOGRAM TRACING: CPT | Performed by: INTERNAL MEDICINE

## 2020-09-28 PROCEDURE — G8752 SYS BP LESS 140: HCPCS | Performed by: INTERNAL MEDICINE

## 2020-09-28 PROCEDURE — G0444 DEPRESSION SCREEN ANNUAL: HCPCS | Performed by: INTERNAL MEDICINE

## 2020-09-28 PROCEDURE — G0463 HOSPITAL OUTPT CLINIC VISIT: HCPCS | Performed by: INTERNAL MEDICINE

## 2020-09-28 PROCEDURE — G8536 NO DOC ELDER MAL SCRN: HCPCS | Performed by: INTERNAL MEDICINE

## 2020-09-28 PROCEDURE — G8427 DOCREV CUR MEDS BY ELIG CLIN: HCPCS | Performed by: INTERNAL MEDICINE

## 2020-09-28 PROCEDURE — 93010 ELECTROCARDIOGRAM REPORT: CPT | Performed by: INTERNAL MEDICINE

## 2020-09-28 RX ORDER — MINOXIDIL 10 MG/1
5 TABLET ORAL DAILY
Qty: 30 TAB | Refills: 3 | Status: SHIPPED | OUTPATIENT
Start: 2020-09-28 | End: 2020-12-14 | Stop reason: ALTCHOICE

## 2020-09-28 RX ORDER — LOSARTAN POTASSIUM 50 MG/1
25 TABLET ORAL DAILY
Qty: 180 TAB | Refills: 0
Start: 2020-09-28 | End: 2021-01-26

## 2020-09-28 NOTE — LETTER
9/29/20 Patient: Sana Delgado YOB: 1950 Date of Visit: 9/28/2020 Evelia Cruz, [address] [facility] 103 [facility] 7 [address] VIA Facsimile: [phone] Dear Evelia Cruz MD, Thank you for referring Mr. Bianca Rae to [address] [place] [address] for evaluation. My notes for this consultation are attached. If you have questions, please do not hesitate to call me. I look forward to following your patient along with you. Sincerely, Jeff Miner MD

## 2020-09-28 NOTE — PROGRESS NOTES
Chief Complaint   Patient presents with    Hypertension    Irregular Heart Beat    Foot Swelling     Betty' feet and lower legs      1. Have you been to the ER, urgent care clinic since your last visit? Hospitalized since your last visit? No     2. Have you seen or consulted any other health care providers outside of the [address] [place] [address] since your last visit? Include any pap smears or colon screening.   Yes Dermatologist on [address] [facility]

## 2020-10-15 ENCOUNTER — TRANSCRIBE ORDER (OUTPATIENT)
Dept: GENERAL RADIOLOGY | Age: 70
End: 2020-10-15

## 2020-10-15 ENCOUNTER — HOSPITAL ENCOUNTER (OUTPATIENT)
Dept: GENERAL RADIOLOGY | Age: 70
Discharge: HOME | End: 2020-10-15
Payer: MEDICARE

## 2020-10-15 DIAGNOSIS — R04.2 HEMOPTYSIS: Primary | ICD-10-CM

## 2020-10-15 DIAGNOSIS — R04.2 HEMOPTYSIS: ICD-10-CM

## 2020-10-15 PROCEDURE — 71046 X-RAY EXAM CHEST 2 VIEWS: CPT

## 2020-10-30 ENCOUNTER — HOSPITAL ENCOUNTER (OUTPATIENT)
Dept: LAB | Age: 70
Discharge: HOME | End: 2020-10-30
Payer: MEDICARE

## 2020-10-30 PROCEDURE — 80048 BASIC METABOLIC PNL TOTAL CA: CPT

## 2020-10-30 PROCEDURE — 36415 COLL VENOUS BLD VENIPUNCTURE: CPT

## 2020-10-30 NOTE — PROGRESS NOTES
Raina Hardy, MIANP-BC    Subjective/HPI:     Magdalena Leiva is a 79 y.o. male is here for routine f/u. He has a PMHx of PAF s/p ablation with ILR, HTN, HLD and hx of hypertrophic cardiomyopathy. Last visit, minoxidil was added for further BP management. Losartan caused increase in serum creatinine. He notes blood pressures at home have been 543-536M systolic. These are taken about 1-2 hours after his AM medications. He has three different BP monitors at home, but only uses one to check BP daily. He has noticed some increased lower extremity swelling since last visit, around the feet and the ankles. PCP Provider  Richard Aquino MD    Past Medical History:   Diagnosis Date    Arrhythmia     a fib    Cancer ([facility] [address] [address]     skin, throat cancer    G tube feedings ([facility] [address] [facility]     PEG    High cholesterol     Hypertension     Hypertrophic cardiomyopathy ([facility] [address] [facility]     per cardiology notes 6/2013    Status post chemoradiation     completed 12/2013    Throat cancer [facility])         No Known Allergies     Outpatient Encounter Medications as of 11/3/2020   Medication Sig Dispense Refill    minoxidiL (LONITEN) 10 mg tablet Take 0.5 Tabs by mouth daily. 30 Tab 3    losartan (COZAAR) 50 mg tablet Take 0.5 Tabs by mouth daily. 180 Tab 0    amLODIPine (NORVASC) 10 mg tablet Take one tablet by mouth once daily. 90 Tab 3    lovastatin (MEVACOR) 40 mg tablet TAKE 1 TABLET DAILY 90 Tab 3    aspirin (ASPIRIN) 325 mg tablet Take 325 mg by mouth daily.  potassium chloride (K-DUR, KLOR-CON) 20 mEq tablet TAKE ONE TABLET BY MOUTH TWICE DAILY  180 Tab 0    levothyroxine (SYNTHROID) 50 mcg tablet Take  by mouth Daily (before breakfast).  cholecalciferol, vitamin D3, (VITAMIN D3) 2,000 unit tab Take 2,000 Units by mouth daily. Indications: PREVENTION OF VITAMIN D DEFICIENCY      [DISCONTINUED] triamterene-hydrochlorothiazide (MAXZIDE) 37.5-25 mg per tablet Take  by mouth daily.        No facility-administered encounter medications on file as of 11/3/2020. Review of Symptoms:    Review of Systems   Constitutional: Negative for chills, fever and weight loss. HENT: Negative for nosebleeds. Eyes: Negative for blurred vision and double vision. Respiratory: Negative for cough, shortness of breath and wheezing. Cardiovascular: Negative for chest pain, palpitations, orthopnea, leg swelling and PND. Gastrointestinal: Negative for abdominal pain, blood in stool, diarrhea, nausea and vomiting. Musculoskeletal: Negative for joint pain. Skin: Negative for rash. Neurological: Negative for dizziness, tingling and loss of consciousness. Endo/Heme/Allergies: Does not bruise/bleed easily. Physical Exam:      General: Well developed, in no acute distress, cooperative and alert  HEENT: No carotid bruits, no JVD, trach is midline. Neck Supple, PEERL, EOM intact. Heart:  reg rate and rhythm; normal S1/S2; no murmurs, no gallops or rubs. Respiratory: Clear bilaterally x 4, no wheezing or rales  Abdomen:   Soft, non-tender, no distention, no masses. + BS. Extremities:  Normal cap refill, no cyanosis, atraumatic. No edema. Neuro: A&Ox3, speech clear, gait stable. Skin: Skin color is normal. No rashes or lesions.  Non diaphoretic  Vascular: 2+ pulses symmetric in all extremities    Vitals:    11/03/20 0901 11/03/20 0907   BP: 126/76 132/84   Pulse: 60    Resp: 18    SpO2: 96%    Weight: 210 lb 14.4 oz (95.7 kg)    Height: 5' 8\" (1.727 m)          Cardiology Labs:    Lab Results   Component Value Date/Time    Cholesterol, total 168 01/09/2020 10:34 AM    HDL Cholesterol 57 01/09/2020 10:34 AM    LDL, calculated 99 01/09/2020 10:34 AM    LDL, calculated 113 (H) 09/18/2009 09:08 AM    LDL, calculated 152.4 (H) 05/18/2009 08:41 AM    VLDL, calculated 12 01/09/2020 10:34 AM    CHOL/HDL Ratio 3.5 09/18/2009 09:08 AM       No results found for: HBA1C, GHJ7BCUI, RNY9WXEE, WZH4RRKD    Lab Results   Component Value Date/Time    Sodium 147 (H) 10/30/2020 11:11 AM    Potassium 3.7 10/30/2020 11:11 AM    Chloride 102 10/30/2020 11:11 AM    CO2 33 (H) 10/30/2020 11:11 AM    Glucose 104 (H) 10/30/2020 11:11 AM    BUN 26 10/30/2020 11:11 AM    Creatinine 1.42 (H) 10/30/2020 11:11 AM    BUN/Creatinine ratio 18 10/30/2020 11:11 AM    GFR est AA 57 (L) 10/30/2020 11:11 AM    GFR est non-AA 50 (L) 10/30/2020 11:11 AM    Calcium 9.6 10/30/2020 11:11 AM    Anion gap 6 06/14/2018 01:05 PM    Bilirubin, total 0.6 01/09/2020 10:34 AM    ALT (SGPT) 15 01/09/2020 10:34 AM    Alk. phosphatase 60 01/09/2020 10:34 AM    Protein, total 6.6 01/09/2020 10:34 AM    Albumin 4.1 01/09/2020 10:34 AM    Globulin 3.1 06/14/2018 01:05 PM    A-G Ratio 1.6 01/09/2020 10:34 AM          Assessment:       ICD-10-CM ICD-9-CM    1. Essential hypertension  I10 401.9    2. Paroxysmal atrial fibrillation (HCC)  I48.0 427.31    3. Hypertrophic cardiomyopathy (HCC)  I42.2 425.18    4. Mixed hyperlipidemia  E78.2 272.2 AMB POC EKG ROUTINE W/ 12 LEADS, INTER & REP   5. Status post placement of implantable loop recorder  Z95.818 V45.09         Plan:     1. Essential hypertension  On minoxidil, amlodipine, maxzide. Serum Cr 1.42 -- improved since d/c'ing aldactone  Stop maxzide; resume HCTZ 12.5 mg daily only  Check BMP in 1 month and follow up  Check renal artery ultrasound  Advised to bring all three BP monitors to next visit, so we can compare.     2. Paroxysmal atrial fibrillation ([address]  S/p AF/ AFL ablation in 10/2017  Maintaining sinus rhythm; hold BB therapy due to baseline bradycardia  Maintaining ASA only     3. Hypertrophic cardiomyopathy ([address]  Echo done in 9/2018 with preserved ejection fraction 55-60% with no evidence of hypertrophic myocardium  Continue present medical management     4. Mixed hyperlipidemia  LDL 99 in 1/2020  Continue statin therapy and low fat, low cholesterol diet  Lipids managed by PCP     5.   S/p implantable loop recorder  Continue with routine follow-up with Dr. Alex Cintron    F/u with Dr. Gena Rowland in 1 month    Julius Cevallos NP       [facility] Cardiology    11/3/2020         Patient seen, examined by me personally. Plan discussed as detailed. Agree with note as outlined by  NP. I confirm findings in history and physical exam. No additional findings noted. Agree with plan as outlined above. R/o NENO. Have him bring home monitor next visit.     Felice Mcdonnell MD

## 2020-10-31 LAB
BUN SERPL-MCNC: 26 MG/DL (ref 8–27)
BUN/CREAT SERPL: 18 (ref 10–24)
CALCIUM SERPL-MCNC: 9.6 MG/DL (ref 8.6–10.2)
CHLORIDE SERPL-SCNC: 102 MMOL/L (ref 96–106)
CO2 SERPL-SCNC: 33 MMOL/L (ref 20–29)
CREAT SERPL-MCNC: 1.42 MG/DL (ref 0.76–1.27)
GLUCOSE SERPL-MCNC: 104 MG/DL (ref 65–99)
INTERPRETATION: NORMAL
POTASSIUM SERPL-SCNC: 3.7 MMOL/L (ref 3.5–5.2)
SODIUM SERPL-SCNC: 147 MMOL/L (ref 134–144)

## 2020-11-03 ENCOUNTER — OFFICE VISIT (OUTPATIENT)
Dept: CARDIOLOGY CLINIC | Age: 70
End: 2020-11-03
Payer: MEDICARE

## 2020-11-03 VITALS
WEIGHT: 210.9 LBS | DIASTOLIC BLOOD PRESSURE: 84 MMHG | HEIGHT: 68 IN | RESPIRATION RATE: 18 BRPM | BODY MASS INDEX: 31.96 KG/M2 | SYSTOLIC BLOOD PRESSURE: 132 MMHG | HEART RATE: 60 BPM | OXYGEN SATURATION: 96 %

## 2020-11-03 DIAGNOSIS — Z95.818 STATUS POST PLACEMENT OF IMPLANTABLE LOOP RECORDER: ICD-10-CM

## 2020-11-03 DIAGNOSIS — I42.2 HYPERTROPHIC CARDIOMYOPATHY (HCC): ICD-10-CM

## 2020-11-03 DIAGNOSIS — E78.2 MIXED HYPERLIPIDEMIA: ICD-10-CM

## 2020-11-03 DIAGNOSIS — I10 ESSENTIAL HYPERTENSION: Primary | ICD-10-CM

## 2020-11-03 DIAGNOSIS — I48.0 PAROXYSMAL ATRIAL FIBRILLATION (HCC): ICD-10-CM

## 2020-11-03 DIAGNOSIS — I10 ESSENTIAL HYPERTENSION: ICD-10-CM

## 2020-11-03 PROCEDURE — G8752 SYS BP LESS 140: HCPCS | Performed by: INTERNAL MEDICINE

## 2020-11-03 PROCEDURE — G8754 DIAS BP LESS 90: HCPCS | Performed by: INTERNAL MEDICINE

## 2020-11-03 PROCEDURE — G8427 DOCREV CUR MEDS BY ELIG CLIN: HCPCS | Performed by: INTERNAL MEDICINE

## 2020-11-03 PROCEDURE — 1101F PT FALLS ASSESS-DOCD LE1/YR: CPT | Performed by: INTERNAL MEDICINE

## 2020-11-03 PROCEDURE — 3017F COLORECTAL CA SCREEN DOC REV: CPT | Performed by: INTERNAL MEDICINE

## 2020-11-03 PROCEDURE — G8536 NO DOC ELDER MAL SCRN: HCPCS | Performed by: INTERNAL MEDICINE

## 2020-11-03 PROCEDURE — G8417 CALC BMI ABV UP PARAM F/U: HCPCS | Performed by: INTERNAL MEDICINE

## 2020-11-03 PROCEDURE — G0463 HOSPITAL OUTPT CLINIC VISIT: HCPCS | Performed by: INTERNAL MEDICINE

## 2020-11-03 PROCEDURE — 99214 OFFICE O/P EST MOD 30 MIN: CPT | Performed by: INTERNAL MEDICINE

## 2020-11-03 PROCEDURE — G8432 DEP SCR NOT DOC, RNG: HCPCS | Performed by: INTERNAL MEDICINE

## 2020-11-03 RX ORDER — HYDROCHLOROTHIAZIDE 12.5 MG/1
12.5 TABLET ORAL DAILY
Qty: 30 TAB | Refills: 1 | Status: SHIPPED | OUTPATIENT
Start: 2020-11-03 | End: 2020-12-14 | Stop reason: ALTCHOICE

## 2020-11-03 NOTE — LETTER
11/3/20 Patient: Ken Wills YOB: 1950 Date of Visit: 11/3/2020 Claire Delgado, [address] [facility] 103 [facility] 7 [address] VIA Facsimile: [phone] Dear Claire Delgado MD, Thank you for referring Mr. Phuong Coronel to [address] [place], [place] [address] for evaluation. My notes for this consultation are attached. If you have questions, please do not hesitate to call me. I look forward to following your patient along with you. Sincerely, Christopher Yap MD

## 2020-11-03 NOTE — PROGRESS NOTES
1. Have you been to the ER, urgent care clinic since your last visit? Hospitalized since your last visit? No    2. Have you seen or consulted any other health care providers outside of the [address] [place] [address] since your last visit? Include any pap smears or colon screening.  No    Chief Complaint   Patient presents with    Hypertension     1 mo f/u

## 2020-11-03 NOTE — PATIENT INSTRUCTIONS
1.  Stop your Maxzide (triamterene-hydrochlorothiazide). 2.  Start hydrochlorothiazide 12.5 mg daily only. A new prescription was sent to your pharmacy. 3.  You will need to repeat labwork in 1 month, before your next follow up visit. 4.  Bring all your blood pressure monitors to the office at your next visit, so we can compare blood pressure across all your monitors.

## 2020-11-09 ENCOUNTER — HOSPITAL ENCOUNTER (OUTPATIENT)
Dept: VASCULAR SURGERY | Age: 70
Discharge: HOME | End: 2020-11-09
Attending: NURSE PRACTITIONER
Payer: MEDICARE

## 2020-11-09 VITALS — DIASTOLIC BLOOD PRESSURE: 98 MMHG | SYSTOLIC BLOOD PRESSURE: 180 MMHG

## 2020-11-09 LAB
ABDOMINAL PROX AORTA VEL: 104.5 CM/S
CELIAC EDV: 35.2 CM/S
CELIAC PSV: 206.3 CM/S
DIST AORTIC AP: 1.6 CM
LEFT KIDNEY LENGTH: 10.6 CM
LEFT KIDNEY WIDTH: 5.2 CM
LEFT RENAL DIST DIAS: 18.6 CM/S
LEFT RENAL DIST RAR: 0.59
LEFT RENAL DIST RI: 0.7
LEFT RENAL DIST SYS: 61.4 CM/S
LEFT RENAL LOWER PARENCHYMA MAX: 14.2 CM/S
LEFT RENAL LOWER PARENCHYMA MIN: 7.2 CM/S
LEFT RENAL LOWER PARENCHYMA RI: 0.49
LEFT RENAL MID DIAS: 41.5 CM/S
LEFT RENAL MID RAR: 1.64
LEFT RENAL MID RI: 0.76
LEFT RENAL MID SYS: 171 CM/S
LEFT RENAL MIDDLE PARENCHYMA MAX: 16 CM/S
LEFT RENAL MIDDLE PARENCHYMA MIN: 7.2 CM/S
LEFT RENAL MIDDLE PARENCHYMA RI: 0.55
LEFT RENAL PROX DIAS: 36 CM/S
LEFT RENAL PROX RAR: 1.98
LEFT RENAL PROX RI: 0.83
LEFT RENAL PROX SYS: 207.1 CM/S
LEFT RENAL UPPER PARENCHYMA MAX: 19.3 CM/S
LEFT RENAL UPPER PARENCHYMA MIN: 7.5 CM/S
LEFT RENAL UPPER PARENCHYMA RI: 0.61
PROX AORTA LONG DIAM: 2 CM
PROX AORTIC AP: 2.4 CM
PROX SMA EDV: 29.8 CM/S
PROX SMA PSV: 375.1 CM/S
RIGHT ARM BP: 180 MMHG
RIGHT KIDNEY LENGTH: 10 CM
RIGHT KIDNEY WIDTH: 5.2 CM
RIGHT RENAL DIST DIAS: 20.8 CM/S
RIGHT RENAL DIST RAR: 0.79
RIGHT RENAL DIST RI: 0.75
RIGHT RENAL DIST SYS: 82.2 CM/S
RIGHT RENAL LOWER PARENCHYMA MAX: 13.7 CM/S
RIGHT RENAL LOWER PARENCHYMA MIN: 6.5 CM/S
RIGHT RENAL LOWER PARENCHYMA RI: 0.53
RIGHT RENAL MID DIAS: 28.3 CM/S
RIGHT RENAL MID RAR: 1.09
RIGHT RENAL MID RI: 0.75
RIGHT RENAL MID SYS: 113.9 CM/S
RIGHT RENAL MIDDLE PARENCHYMA MAX: 25.2 CM/S
RIGHT RENAL MIDDLE PARENCHYMA MIN: 8.2 CM/S
RIGHT RENAL MIDDLE PARENCHYMA RI: 0.67
RIGHT RENAL PROX DIAS: 28.3 CM/S
RIGHT RENAL PROX RAR: 1.4
RIGHT RENAL PROX RI: 0.81
RIGHT RENAL PROX SYS: 146.8 CM/S
RIGHT RENAL UPPER PARENCHYMA MAX: 27.4 CM/S
RIGHT RENAL UPPER PARENCHYMA MIN: 9.8 CM/S
RIGHT RENAL UPPER PARENCHYMA RI: 0.64

## 2020-11-09 PROCEDURE — 93975 VASCULAR STUDY: CPT

## 2020-11-10 NOTE — PROGRESS NOTES
Good morning Mr. Selin Llamas,    Your kidney artery ultrasound did not reveal significant blockages of the kidney arteries. This is good news. We'll touch base at your next appointment with us.     Thanks,  Jayden Suarez, MARIO ALBERTO-BC

## 2020-11-23 ENCOUNTER — OFFICE VISIT (OUTPATIENT)
Dept: CARDIOLOGY CLINIC | Age: 70
End: 2020-11-23

## 2020-11-23 DIAGNOSIS — Z45.09 ENCOUNTER FOR LOOP RECORDER CHECK: ICD-10-CM

## 2020-11-23 DIAGNOSIS — I48.0 PAROXYSMAL ATRIAL FIBRILLATION (HCC): Primary | ICD-10-CM

## 2020-12-03 DIAGNOSIS — I10 ESSENTIAL HYPERTENSION: ICD-10-CM

## 2020-12-07 ENCOUNTER — HOSPITAL ENCOUNTER (OUTPATIENT)
Dept: LAB | Age: 70
Discharge: HOME | End: 2020-12-07
Payer: MEDICARE

## 2020-12-07 PROCEDURE — 80048 BASIC METABOLIC PNL TOTAL CA: CPT

## 2020-12-07 PROCEDURE — 36415 COLL VENOUS BLD VENIPUNCTURE: CPT

## 2020-12-08 ENCOUNTER — OFFICE VISIT (OUTPATIENT)
Dept: CARDIOLOGY CLINIC | Age: 70
End: 2020-12-08
Payer: MEDICARE

## 2020-12-08 VITALS
SYSTOLIC BLOOD PRESSURE: 130 MMHG | RESPIRATION RATE: 18 BRPM | HEART RATE: 42 BPM | BODY MASS INDEX: 30.72 KG/M2 | OXYGEN SATURATION: 97 % | HEIGHT: 68 IN | DIASTOLIC BLOOD PRESSURE: 80 MMHG | WEIGHT: 202.7 LBS

## 2020-12-08 DIAGNOSIS — I49.5 SSS (SICK SINUS SYNDROME) (HCC): ICD-10-CM

## 2020-12-08 DIAGNOSIS — E78.2 MIXED HYPERLIPIDEMIA: ICD-10-CM

## 2020-12-08 DIAGNOSIS — I10 ESSENTIAL HYPERTENSION: ICD-10-CM

## 2020-12-08 DIAGNOSIS — I48.0 PAROXYSMAL ATRIAL FIBRILLATION (HCC): Primary | ICD-10-CM

## 2020-12-08 LAB
BUN SERPL-MCNC: 29 MG/DL (ref 8–27)
BUN/CREAT SERPL: 19 (ref 10–24)
CALCIUM SERPL-MCNC: 9.6 MG/DL (ref 8.6–10.2)
CHLORIDE SERPL-SCNC: 102 MMOL/L (ref 96–106)
CO2 SERPL-SCNC: 27 MMOL/L (ref 20–29)
CREAT SERPL-MCNC: 1.5 MG/DL (ref 0.76–1.27)
GLUCOSE SERPL-MCNC: 83 MG/DL (ref 65–99)
INTERPRETATION: NORMAL
POTASSIUM SERPL-SCNC: 5 MMOL/L (ref 3.5–5.2)
SODIUM SERPL-SCNC: 141 MMOL/L (ref 134–144)

## 2020-12-08 PROCEDURE — 1101F PT FALLS ASSESS-DOCD LE1/YR: CPT | Performed by: INTERNAL MEDICINE

## 2020-12-08 PROCEDURE — G8427 DOCREV CUR MEDS BY ELIG CLIN: HCPCS | Performed by: INTERNAL MEDICINE

## 2020-12-08 PROCEDURE — 93010 ELECTROCARDIOGRAM REPORT: CPT | Performed by: INTERNAL MEDICINE

## 2020-12-08 PROCEDURE — G8510 SCR DEP NEG, NO PLAN REQD: HCPCS | Performed by: INTERNAL MEDICINE

## 2020-12-08 PROCEDURE — 3017F COLORECTAL CA SCREEN DOC REV: CPT | Performed by: INTERNAL MEDICINE

## 2020-12-08 PROCEDURE — 93005 ELECTROCARDIOGRAM TRACING: CPT | Performed by: INTERNAL MEDICINE

## 2020-12-08 PROCEDURE — G8417 CALC BMI ABV UP PARAM F/U: HCPCS | Performed by: INTERNAL MEDICINE

## 2020-12-08 PROCEDURE — G0463 HOSPITAL OUTPT CLINIC VISIT: HCPCS | Performed by: INTERNAL MEDICINE

## 2020-12-08 PROCEDURE — G8536 NO DOC ELDER MAL SCRN: HCPCS | Performed by: INTERNAL MEDICINE

## 2020-12-08 PROCEDURE — 99214 OFFICE O/P EST MOD 30 MIN: CPT | Performed by: INTERNAL MEDICINE

## 2020-12-08 PROCEDURE — G8752 SYS BP LESS 140: HCPCS | Performed by: INTERNAL MEDICINE

## 2020-12-08 PROCEDURE — G8754 DIAS BP LESS 90: HCPCS | Performed by: INTERNAL MEDICINE

## 2020-12-08 RX ORDER — SPIRONOLACTONE 25 MG/1
25 TABLET ORAL 2 TIMES DAILY
COMMUNITY
Start: 2020-11-30 | End: 2020-12-15 | Stop reason: SDUPTHER

## 2020-12-08 RX ORDER — FUROSEMIDE 40 MG/1
40 TABLET ORAL DAILY
COMMUNITY
Start: 2020-11-30 | End: 2020-12-15 | Stop reason: SDUPTHER

## 2020-12-08 RX ORDER — CARVEDILOL 25 MG/1
12.5 TABLET ORAL 2 TIMES DAILY WITH MEALS
COMMUNITY
Start: 2020-11-30 | End: 2021-02-01 | Stop reason: DRUGHIGH

## 2020-12-08 NOTE — PROGRESS NOTES
Good morning Mr. Di Bhatt kidney function is stable since starting HCTZ and stopping your Maxzide. Please keep your follow up appointment with us as scheduled, so we can continue to adjust your BP medications if needed.     Thanks,  Angely Falk, MIANP-BC

## 2020-12-08 NOTE — PROGRESS NOTES
Chief Complaint   Patient presents with    Irregular Heart Beat     A Fib showed on monitor -     Shortness of Breath     was having with CHF exaccerbation - hosp at [facility]     Leg Swelling     luann lower legs, ankles, and feet     1. Have you been to the ER, urgent care clinic since your last visit? Hospitalized since your last visit? Yes [facility] for CHF exaccerbation     2. Have you seen or consulted any other health care providers outside of the [address] [place] [address] since your last visit? Include any pap smears or colon screening.   Yes see above

## 2020-12-08 NOTE — LETTER
12/8/20 Patient: Jorge A Luu YOB: 1950 Date of Visit: 12/8/2020 Mauricio Baird, [address] [facility] 103 [facility] 7 [address] VIA Facsimile: [phone] Dear Mauricio Baird MD, Thank you for referring Mr. Ant Triana to [address] [place] [address] for evaluation. My notes for this consultation are attached. If you have questions, please do not hesitate to call me. I look forward to following your patient along with you. Sincerely, Elaine Fitzgerald MD

## 2020-12-09 ENCOUNTER — TELEPHONE (OUTPATIENT)
Dept: CARDIOLOGY CLINIC | Age: 70
End: 2020-12-09

## 2020-12-09 NOTE — TELEPHONE ENCOUNTER
Verified patient with 2 identifiers.  He was here for an appt yesterday-forgot his med list. Reviewed meds over the phone  Informed Monique GARCIA regarding updated list.

## 2020-12-10 NOTE — PROGRESS NOTES
Subjective/HPI:     Zoe Garcia is a 79 y.o. male is here for routine f/u. He has a PMHx of PAF s/p ablation with ILR, HTN, HLD and hx of hypertrophic cardiomyopathy. Since last visit, he had renal artery ultrasound done for further secondary hypertension workup. Medications were also adjusted -- HCTZ was resumed, maxzide was stopped. Was also recommended to bring his BP cuff from home to compare readings. He saw EP at f/u last week. Was noted to have marked sinus bradycardia. Recommended PPM implant given symptoms of fatigue -- he deferred as he felt his fatigue was related to RE from medications. Medication list is different from our last visit --- now he has losartan,carvedilol, lasix and aldactone on his list.  Lasix and aldactone were d/c'd previously due to elevated SCr. He has been monitoring his BP/HR at home. BP trending 110s-140s/70s. HR upper 40s to low 50s. He reports he is having issues with swelling in his leg. Can be there despite elevating his legs. He denies orthopnea or PND. He is concerned about his HR being slower, he reports fatigue. PCP Provider  Melba Dejesus MD    Past Medical History:   Diagnosis Date    Arrhythmia     a fib    [facility] ([facility]     skin, throat cancer    Congestive heart failure ([facility] 11/2020    hosp at [address] tube feedings ([facility]     PEG    High cholesterol     Hypertension     Hypertrophic cardiomyopathy ([facility]     per cardiology notes 6/2013    Status post chemoradiation     completed 12/2013    Throat cancer [facility])         No Known Allergies     Outpatient Encounter Medications as of 12/14/2020   Medication Sig Dispense Refill    carvediloL (COREG) 25 mg tablet Take 25 mg by mouth two (2) times daily (with meals).  furosemide (LASIX) 40 mg tablet Take 40 mg by mouth daily.  spironolactone (ALDACTONE) 25 mg tablet Take 25 mg by mouth two (2) times a day.       losartan (COZAAR) 50 mg tablet Take 0.5 Tabs by mouth daily. (Patient taking differently: Take 50 mg by mouth two (2) times a day.) 180 Tab 0    amLODIPine (NORVASC) 10 mg tablet Take one tablet by mouth once daily. 90 Tab 3    lovastatin (MEVACOR) 40 mg tablet TAKE 1 TABLET DAILY 90 Tab 3    aspirin (ASPIRIN) 325 mg tablet Take 325 mg by mouth daily.  potassium chloride (K-DUR, KLOR-CON) 20 mEq tablet TAKE ONE TABLET BY MOUTH TWICE DAILY  180 Tab 0    levothyroxine (SYNTHROID) 50 mcg tablet Take  by mouth Daily (before breakfast).  cholecalciferol, vitamin D3, (VITAMIN D3) 2,000 unit tab Take 2,000 Units by mouth daily. Indications: PREVENTION OF VITAMIN D DEFICIENCY      hydroCHLOROthiazide (HYDRODIURIL) 12.5 mg tablet Take 1 Tab by mouth daily. (Patient not taking: Reported on 12/14/2020) 30 Tab 1    minoxidiL (LONITEN) 10 mg tablet Take 0.5 Tabs by mouth daily. (Patient not taking: Reported on 12/14/2020) 30 Tab 3     No facility-administered encounter medications on file as of 12/14/2020. Review of Symptoms:    Review of Systems   Constitutional: Positive for malaise/fatigue. Negative for chills, fever and weight loss. HENT: Negative for nosebleeds. Eyes: Negative for blurred vision and double vision. Respiratory: Negative for cough, shortness of breath and wheezing. Cardiovascular: Positive for leg swelling. Negative for chest pain, palpitations, orthopnea and PND. Gastrointestinal: Negative for abdominal pain, blood in stool, diarrhea, nausea and vomiting. Musculoskeletal: Negative for joint pain. Skin: Negative for rash. Neurological: Negative for dizziness, tingling and loss of consciousness. Endo/Heme/Allergies: Does not bruise/bleed easily. Physical Exam:      General: Well developed, in no acute distress, cooperative and alert  HEENT: No carotid bruits, no JVD, trach is midline. Neck Supple, PEERL, EOM intact. Heart:  reg rate and rhythm; normal S1/S2; no murmurs, no gallops or rubs. Respiratory: Clear bilaterally x 4, no wheezing or rales  Abdomen:   Soft, non-tender, no distention, no masses. + BS. Extremities:  Normal cap refill, no cyanosis, atraumatic. 1+ luann LE edema. Neuro: A&Ox3, speech clear, gait stable. Skin: Skin color is normal. No rashes or lesions. Non diaphoretic  Vascular: 2+ pulses symmetric in all extremities    Vitals:    12/14/20 1139 12/14/20 1140   BP: 130/70 132/73   Pulse: (!) 40 (!) 40   Resp: 16    SpO2: 96%    Weight: 204 lb 4.8 oz (92.7 kg)    Height: 5' 8\" (1.727 m)          Cardiology Labs:    Lab Results   Component Value Date/Time    Cholesterol, total 168 01/09/2020 10:34 AM    HDL Cholesterol 57 01/09/2020 10:34 AM    LDL, calculated 99 01/09/2020 10:34 AM    LDL, calculated 113 (H) 09/18/2009 09:08 AM    LDL, calculated 152.4 (H) 05/18/2009 08:41 AM    VLDL, calculated 12 01/09/2020 10:34 AM    CHOL/HDL Ratio 3.5 09/18/2009 09:08 AM       No results found for: HBA1C, JLE1DCDK, MFP4QGJO, LVQ7YHVE    Lab Results   Component Value Date/Time    Sodium 141 12/07/2020 10:32 AM    Potassium 5.0 12/07/2020 10:32 AM    Chloride 102 12/07/2020 10:32 AM    CO2 27 12/07/2020 10:32 AM    Glucose 83 12/07/2020 10:32 AM    BUN 29 (H) 12/07/2020 10:32 AM    Creatinine 1.50 (H) 12/07/2020 10:32 AM    BUN/Creatinine ratio 19 12/07/2020 10:32 AM    GFR est AA 54 (L) 12/07/2020 10:32 AM    GFR est non-AA 46 (L) 12/07/2020 10:32 AM    Calcium 9.6 12/07/2020 10:32 AM    Anion gap 6 06/14/2018 01:05 PM    Bilirubin, total 0.6 01/09/2020 10:34 AM    ALT (SGPT) 15 01/09/2020 10:34 AM    Alk. phosphatase 60 01/09/2020 10:34 AM    Protein, total 6.6 01/09/2020 10:34 AM    Albumin 4.1 01/09/2020 10:34 AM    Globulin 3.1 06/14/2018 01:05 PM    A-G Ratio 1.6 01/09/2020 10:34 AM          Assessment:       ICD-10-CM ICD-9-CM    1. Essential hypertension  I10 401.9    2. Paroxysmal atrial fibrillation (HCC)  I48.0 427.31    3. Hypertrophic cardiomyopathy (HCC)  I42.2 425.18    4. Mixed hyperlipidemia  E78.2 272.2    5. Status post placement of implantable loop recorder  Z95.818 V45.09         Plan:     1. Essential hypertension  On losartan- unsure of dose, on amlodipine 10mg, coreg 25 bid, Lasix 40mg and Spironolactone 25  Serum Cr 1.42 -- improved   Renal artery duplex 11/2020 without evidence of NENO  It appears he is taking Losartan 50mg bid, will confirm dose. Consider referral to nephrology for further secondary HTN workup if BP remains elevated.     2. Paroxysmal atrial fibrillation ([address]  S/p AF/ AFL ablation in 10/2017  Maintaining sinus rhythm, trending SB, reporting fatigue. Decrease Coreg to 12.5mg bid     3. Hypertrophic cardiomyopathy ([facility]  Echo done in 9/2018 with preserved ejection fraction 55-60% with no evidence of hypertrophic myocardium  On BB, SRB, Lasix, and Spironolactone. Increase Lasix to 80mg bid for a week to see if edema is improved, then return to 40mg daily. Continue other meds, reducing Coreg. Will need to monitor BP at home. If sbp trending 150s and higher or DBP 90s or higher call back sooner to discuss.      4. Mixed hyperlipidemia  LDL 99 in 1/2020  Continue statin therapy and low fat, low cholesterol diet  Lipids managed by PCP     5. S/p implantable loop recorder  Continue with routine follow-up with Dr. Huggins [facility]    F/u with Dr. Pako Martin in 3-4 weeks. Amari Germain NP       [facility] Cardiology    12/14/2020         Patient seen, examined by me personally. Plan discussed as detailed. Agree with note as outlined by  NP. I confirm findings in history and physical exam. No additional findings noted. Agree with plan as outlined above.      Taylor Crawford MD

## 2020-12-14 ENCOUNTER — OFFICE VISIT (OUTPATIENT)
Dept: CARDIOLOGY CLINIC | Age: 70
End: 2020-12-14
Payer: MEDICARE

## 2020-12-14 ENCOUNTER — TELEPHONE (OUTPATIENT)
Dept: CARDIOLOGY CLINIC | Age: 70
End: 2020-12-14

## 2020-12-14 VITALS
BODY MASS INDEX: 30.96 KG/M2 | RESPIRATION RATE: 16 BRPM | HEART RATE: 40 BPM | HEIGHT: 68 IN | WEIGHT: 204.3 LBS | OXYGEN SATURATION: 96 % | DIASTOLIC BLOOD PRESSURE: 73 MMHG | SYSTOLIC BLOOD PRESSURE: 132 MMHG

## 2020-12-14 DIAGNOSIS — I42.2 HYPERTROPHIC CARDIOMYOPATHY (HCC): ICD-10-CM

## 2020-12-14 DIAGNOSIS — Z95.818 STATUS POST PLACEMENT OF IMPLANTABLE LOOP RECORDER: ICD-10-CM

## 2020-12-14 DIAGNOSIS — I10 ESSENTIAL HYPERTENSION: Primary | ICD-10-CM

## 2020-12-14 DIAGNOSIS — I48.0 PAROXYSMAL ATRIAL FIBRILLATION (HCC): ICD-10-CM

## 2020-12-14 DIAGNOSIS — E78.2 MIXED HYPERLIPIDEMIA: ICD-10-CM

## 2020-12-14 PROCEDURE — G8417 CALC BMI ABV UP PARAM F/U: HCPCS | Performed by: INTERNAL MEDICINE

## 2020-12-14 PROCEDURE — 99214 OFFICE O/P EST MOD 30 MIN: CPT | Performed by: INTERNAL MEDICINE

## 2020-12-14 PROCEDURE — G0463 HOSPITAL OUTPT CLINIC VISIT: HCPCS | Performed by: INTERNAL MEDICINE

## 2020-12-14 PROCEDURE — G8752 SYS BP LESS 140: HCPCS | Performed by: INTERNAL MEDICINE

## 2020-12-14 PROCEDURE — G8427 DOCREV CUR MEDS BY ELIG CLIN: HCPCS | Performed by: INTERNAL MEDICINE

## 2020-12-14 PROCEDURE — 3017F COLORECTAL CA SCREEN DOC REV: CPT | Performed by: INTERNAL MEDICINE

## 2020-12-14 PROCEDURE — 1101F PT FALLS ASSESS-DOCD LE1/YR: CPT | Performed by: INTERNAL MEDICINE

## 2020-12-14 PROCEDURE — G8432 DEP SCR NOT DOC, RNG: HCPCS | Performed by: INTERNAL MEDICINE

## 2020-12-14 PROCEDURE — G8754 DIAS BP LESS 90: HCPCS | Performed by: INTERNAL MEDICINE

## 2020-12-14 PROCEDURE — G8536 NO DOC ELDER MAL SCRN: HCPCS | Performed by: INTERNAL MEDICINE

## 2020-12-14 NOTE — TELEPHONE ENCOUNTER
Patient was seen in office today and now has questions about medication what should he be taking and how.     [phone]    And needs refills sent to [facility]    Thanks  Ro Coley

## 2020-12-14 NOTE — LETTER
12/14/20 Patient: Delvin Putnam YOB: 1950 Date of Visit: 12/14/2020 Aj Car, [address] [facility] 103 [facility] 7 [address] VIA Facsimile: [phone] Dear Aj Car MD, Thank you for referring Mr. Maninder Peterson to [address] [place] [address] for evaluation. My notes for this consultation are attached. If you have questions, please do not hesitate to call me. I look forward to following your patient along with you. Sincerely, Rhonda Avilez MD

## 2020-12-14 NOTE — PROGRESS NOTES
Chief Complaint   Patient presents with    Follow-up    Cardiomyopathy    Cholesterol Problem    Irregular Heart Beat     1. Have you been to the ER, urgent care clinic since your last visit? Hospitalized since your last visit? NO    2. Have you seen or consulted any other health care providers outside of the [address] [place] [address] since your last visit? Include any pap smears or colon screening.  NO    Patient C/O low heart rate ,bilateral ankle edema and SOB

## 2020-12-16 RX ORDER — FUROSEMIDE 40 MG/1
40 TABLET ORAL DAILY
Qty: 90 TAB | Refills: 3 | Status: SHIPPED | OUTPATIENT
Start: 2020-12-16 | End: 2020-12-30

## 2020-12-16 RX ORDER — SPIRONOLACTONE 25 MG/1
25 TABLET ORAL 2 TIMES DAILY
Qty: 180 TAB | Refills: 3 | Status: SHIPPED | OUTPATIENT
Start: 2020-12-16 | End: 2020-12-30

## 2020-12-18 ENCOUNTER — OFFICE VISIT (OUTPATIENT)
Dept: CARDIOLOGY CLINIC | Age: 70
End: 2020-12-18
Payer: MEDICARE

## 2020-12-18 DIAGNOSIS — Z45.09 ENCOUNTER FOR LOOP RECORDER CHECK: ICD-10-CM

## 2020-12-18 DIAGNOSIS — I48.0 PAROXYSMAL ATRIAL FIBRILLATION (HCC): Primary | ICD-10-CM

## 2020-12-18 PROCEDURE — 93298 REM INTERROG DEV EVAL SCRMS: CPT | Performed by: INTERNAL MEDICINE

## 2020-12-21 ENCOUNTER — TELEPHONE (OUTPATIENT)
Dept: CARDIOLOGY CLINIC | Age: 70
End: 2020-12-21

## 2020-12-22 RX ORDER — LOVASTATIN 40 MG/1
TABLET ORAL
Qty: 90 TAB | Refills: 3 | Status: SHIPPED | OUTPATIENT
Start: 2020-12-22 | End: 2022-04-04 | Stop reason: SDUPTHER

## 2020-12-23 ENCOUNTER — TELEPHONE (OUTPATIENT)
Dept: CARDIOLOGY CLINIC | Age: 70
End: 2020-12-23

## 2020-12-23 NOTE — TELEPHONE ENCOUNTER
Pharmacist called to advise a drug interaction with aldactone 25 mg. Please call them at [phone], option 2    Reference# [id number].     Thanks

## 2020-12-28 NOTE — TELEPHONE ENCOUNTER
[facility] to call me. [facility] calling, thinks pt is still on Aldactone and Triamterene/HCTZ. We show Triamterene/HCTZ was stopped. Is on Aldactone. Verifying with pt prior to notifying [facility].

## 2020-12-28 NOTE — TELEPHONE ENCOUNTER
Please call pharmacy regarding possible drug interaction    [phone] option #2 REF # [id number]    Spironoloactone     Thanks  Winnie Melendez

## 2020-12-29 NOTE — TELEPHONE ENCOUNTER
Spoke with pharmacy. Verified patient with two patient identifiers. Advised pt is no longer taking Triamterene/HCTZ. They verbalized understanding.

## 2020-12-30 RX ORDER — SPIRONOLACTONE 25 MG/1
TABLET ORAL
Qty: 28 TAB | Refills: 0 | Status: SHIPPED | OUTPATIENT
Start: 2020-12-30 | End: 2021-11-02

## 2020-12-30 RX ORDER — FUROSEMIDE 40 MG/1
TABLET ORAL
Qty: 14 TAB | Refills: 0 | Status: SHIPPED | OUTPATIENT
Start: 2020-12-30 | End: 2021-01-07

## 2021-01-25 ENCOUNTER — HOSPITAL ENCOUNTER (OUTPATIENT)
Dept: LAB | Age: 71
Discharge: HOME | End: 2021-01-25
Payer: MEDICARE

## 2021-01-25 PROCEDURE — 36415 COLL VENOUS BLD VENIPUNCTURE: CPT

## 2021-01-25 PROCEDURE — 80048 BASIC METABOLIC PNL TOTAL CA: CPT

## 2021-01-26 ENCOUNTER — OFFICE VISIT (OUTPATIENT)
Dept: CARDIOLOGY CLINIC | Age: 71
End: 2021-01-26
Payer: MEDICARE

## 2021-01-26 VITALS
HEIGHT: 68 IN | OXYGEN SATURATION: 97 % | DIASTOLIC BLOOD PRESSURE: 72 MMHG | BODY MASS INDEX: 30.92 KG/M2 | HEART RATE: 55 BPM | WEIGHT: 204 LBS | SYSTOLIC BLOOD PRESSURE: 130 MMHG | RESPIRATION RATE: 18 BRPM

## 2021-01-26 DIAGNOSIS — R60.9 EDEMA, UNSPECIFIED TYPE: ICD-10-CM

## 2021-01-26 DIAGNOSIS — I10 ESSENTIAL HYPERTENSION: ICD-10-CM

## 2021-01-26 DIAGNOSIS — I49.5 SSS (SICK SINUS SYNDROME) (HCC): ICD-10-CM

## 2021-01-26 DIAGNOSIS — I42.2 HYPERTROPHIC CARDIOMYOPATHY (HCC): Primary | ICD-10-CM

## 2021-01-26 DIAGNOSIS — I48.0 PAROXYSMAL ATRIAL FIBRILLATION (HCC): ICD-10-CM

## 2021-01-26 DIAGNOSIS — Z95.818 STATUS POST PLACEMENT OF IMPLANTABLE LOOP RECORDER: ICD-10-CM

## 2021-01-26 DIAGNOSIS — E78.2 MIXED HYPERLIPIDEMIA: ICD-10-CM

## 2021-01-26 LAB
BUN SERPL-MCNC: 27 MG/DL (ref 8–27)
BUN/CREAT SERPL: 19 (ref 10–24)
CALCIUM SERPL-MCNC: 9.6 MG/DL (ref 8.6–10.2)
CHLORIDE SERPL-SCNC: 106 MMOL/L (ref 96–106)
CO2 SERPL-SCNC: 27 MMOL/L (ref 20–29)
CREAT SERPL-MCNC: 1.39 MG/DL (ref 0.76–1.27)
GLUCOSE SERPL-MCNC: 92 MG/DL (ref 65–99)
INTERPRETATION: NORMAL
POTASSIUM SERPL-SCNC: 4.5 MMOL/L (ref 3.5–5.2)
SODIUM SERPL-SCNC: 144 MMOL/L (ref 134–144)

## 2021-01-26 PROCEDURE — 99214 OFFICE O/P EST MOD 30 MIN: CPT | Performed by: INTERNAL MEDICINE

## 2021-01-26 PROCEDURE — 1101F PT FALLS ASSESS-DOCD LE1/YR: CPT | Performed by: INTERNAL MEDICINE

## 2021-01-26 PROCEDURE — G8417 CALC BMI ABV UP PARAM F/U: HCPCS | Performed by: INTERNAL MEDICINE

## 2021-01-26 PROCEDURE — G8432 DEP SCR NOT DOC, RNG: HCPCS | Performed by: INTERNAL MEDICINE

## 2021-01-26 PROCEDURE — G8536 NO DOC ELDER MAL SCRN: HCPCS | Performed by: INTERNAL MEDICINE

## 2021-01-26 PROCEDURE — 93010 ELECTROCARDIOGRAM REPORT: CPT | Performed by: INTERNAL MEDICINE

## 2021-01-26 PROCEDURE — G8427 DOCREV CUR MEDS BY ELIG CLIN: HCPCS | Performed by: INTERNAL MEDICINE

## 2021-01-26 PROCEDURE — G8754 DIAS BP LESS 90: HCPCS | Performed by: INTERNAL MEDICINE

## 2021-01-26 PROCEDURE — G0463 HOSPITAL OUTPT CLINIC VISIT: HCPCS | Performed by: INTERNAL MEDICINE

## 2021-01-26 PROCEDURE — 93005 ELECTROCARDIOGRAM TRACING: CPT | Performed by: INTERNAL MEDICINE

## 2021-01-26 PROCEDURE — 3017F COLORECTAL CA SCREEN DOC REV: CPT | Performed by: INTERNAL MEDICINE

## 2021-01-26 PROCEDURE — G8752 SYS BP LESS 140: HCPCS | Performed by: INTERNAL MEDICINE

## 2021-01-26 RX ORDER — LOSARTAN POTASSIUM 25 MG/1
25 TABLET ORAL DAILY
Qty: 1 TAB | Refills: 0
Start: 2021-01-26 | End: 2021-03-16

## 2021-01-26 NOTE — LETTER
1/26/2021 Patient: Trung Fabian YOB: 1950 Date of Visit: 1/26/2021 Maury Santiago, [address] [facility] [address] [place] [address] Via Fax: [phone] Dear Maury Santiago MD, Thank you for referring Mr. Jesús Montano to [address] [place] [address] for evaluation. My notes for this consultation are attached. If you have questions, please do not hesitate to call me. I look forward to following your patient along with you. Sincerely, Raisa Trimble MD

## 2021-01-26 NOTE — PROGRESS NOTES
Subjective/HPI:     Shyam Beyer is a 70 y.o. male is here for routine f/u. He has a PMHx of PAF s/p ablation with ILR, HTN, HLD and hx of hypertrophic cardiomyopathy. He denies CP, SOB/DUMONT, orthopnea, PND or edema. He denies palpitations, lightheadedness or syncope. He denies fatigue. PCP Provider  Clemente Crespo MD    Past Medical History:   Diagnosis Date    Arrhythmia     a fib    Cancer ([facility]     skin, throat cancer    Congestive heart failure ([facility] 11/2020    hosp at [address] tube feedings ([facility]     PEG    High cholesterol     Hypertension     [facility]     per cardiology notes 6/2013    Status post chemoradiation     completed 12/2013    Throat cancer ([facility]         No Known Allergies     Outpatient Encounter Medications as of 1/26/2021   Medication Sig Dispense Refill    losartan (COZAAR) 25 mg tablet Take 1 Tab by mouth daily. 1 Tab 0    furosemide (LASIX) 40 mg tablet TAKE ONE TABLET BY MOUTH ONE TIME DAILY  30 Tab 3    spironolactone (ALDACTONE) 25 mg tablet TAKE ONE TABLET BY MOUTH TWICE DAILY  28 Tab 0    lovastatin (MEVACOR) 40 mg tablet TAKE 1 TABLET DAILY 90 Tab 3    carvediloL (COREG) 25 mg tablet Take 12.5 mg by mouth two (2) times daily (with meals).  amLODIPine (NORVASC) 10 mg tablet Take one tablet by mouth once daily. 90 Tab 3    aspirin (ASPIRIN) 325 mg tablet Take 325 mg by mouth daily.  potassium chloride (K-DUR, KLOR-CON) 20 mEq tablet TAKE ONE TABLET BY MOUTH TWICE DAILY  180 Tab 0    levothyroxine (SYNTHROID) 50 mcg tablet Take  by mouth Daily (before breakfast).  cholecalciferol, vitamin D3, (VITAMIN D3) 2,000 unit tab Take 2,000 Units by mouth daily. Indications: PREVENTION OF VITAMIN D DEFICIENCY      [DISCONTINUED] losartan (COZAAR) 50 mg tablet Take 0.5 Tabs by mouth daily. 180 Tab 0     No facility-administered encounter medications on file as of 1/26/2021.          Review of Symptoms:    Review of Systems   Constitutional: Negative for chills, fever, malaise/fatigue and weight loss. HENT: Negative for nosebleeds. Eyes: Negative for blurred vision and double vision. Respiratory: Negative for cough, shortness of breath and wheezing. Cardiovascular: Negative for chest pain, palpitations, orthopnea, leg swelling and PND. Gastrointestinal: Negative for abdominal pain, blood in stool, diarrhea, nausea and vomiting. Musculoskeletal: Negative for joint pain. Skin: Negative for rash. Neurological: Negative for dizziness, tingling and loss of consciousness. Endo/Heme/Allergies: Does not bruise/bleed easily. Physical Exam:      General: Well developed, in no acute distress, cooperative and alert  HEENT: No carotid bruits, no JVD, trach is midline. Neck Supple, PEERL, EOM intact. Heart:  reg rate and rhythm; normal S1/S2; no murmurs, no gallops or rubs. Respiratory: Clear bilaterally x 4, no wheezing or rales  Abdomen:   Soft, non-tender, no distention, no masses. + BS. Extremities:  Normal cap refill, no cyanosis, atraumatic. 1+ luann LE edema. Neuro: A&Ox3, speech clear, gait stable. Skin: Skin color is normal. No rashes or lesions.  Non diaphoretic  Vascular: 2+ pulses symmetric in all extremities    Vitals:    01/26/21 0918   BP: 130/72   Pulse: (!) 55   Resp: 18   SpO2: 97%   Weight: 204 lb (92.5 kg)   Height: 5' 8\" (1.727 m)         Cardiology Labs:    Lab Results   Component Value Date/Time    Cholesterol, total 168 01/09/2020 10:34 AM    HDL Cholesterol 57 01/09/2020 10:34 AM    LDL, calculated 99 01/09/2020 10:34 AM    LDL, calculated 113 (H) 09/18/2009 09:08 AM    LDL, calculated 152.4 (H) 05/18/2009 08:41 AM    VLDL, calculated 12 01/09/2020 10:34 AM    CHOL/HDL Ratio 3.5 09/18/2009 09:08 AM       No results found for: HBA1C, PUA1PCYD, ZRN8PUKK, KMG9POCP    Lab Results   Component Value Date/Time    Sodium 144 01/25/2021 10:26 AM    Potassium 4.5 01/25/2021 10:26 AM    Chloride 106 01/25/2021 10:26 AM    CO2 27 01/25/2021 10:26 AM    Glucose 92 01/25/2021 10:26 AM    BUN 27 01/25/2021 10:26 AM    Creatinine 1.39 (H) 01/25/2021 10:26 AM    BUN/Creatinine ratio 19 01/25/2021 10:26 AM    GFR est AA 59 (L) 01/25/2021 10:26 AM    GFR est non-AA 51 (L) 01/25/2021 10:26 AM    Calcium 9.6 01/25/2021 10:26 AM    Anion gap 6 06/14/2018 01:05 PM    Bilirubin, total 0.6 01/09/2020 10:34 AM    ALT (SGPT) 15 01/09/2020 10:34 AM    Alk. phosphatase 60 01/09/2020 10:34 AM    Protein, total 6.6 01/09/2020 10:34 AM    Albumin 4.1 01/09/2020 10:34 AM    Globulin 3.1 06/14/2018 01:05 PM    A-G Ratio 1.6 01/09/2020 10:34 AM          Assessment:       ICD-10-CM ICD-9-CM    1. Hypertrophic cardiomyopathy (HCC)  I42.2 425.18    2. Essential hypertension  I10 401.9    3. Paroxysmal atrial fibrillation (HCC)  I48.0 427.31 AMB POC EKG ROUTINE W/ 12 LEADS, INTER & REP   4. SSS (sick sinus syndrome) (HCC)  I49.5 427.81    5. Mixed hyperlipidemia  E78.2 272.2    6. Status post placement of implantable loop recorder  Z95.818 V45.09    7. Edema, unspecified type  R60.9 782.3 DUPLEX LOWER EXT VENOUS BILAT        Plan:     1. Essential hypertension  On losartan- unsure of dose at his appt in December, on amlodipine 10mg, coreg 12.5 bid, Lasix 40mg and Spironolactone 25  Serum Cr 1.50 12/7/20, stable   Renal artery duplex 11/2020 without evidence of NENO  It appears he is taking Losartan 25 mg daily. Will continue.     2. Paroxysmal atrial fibrillation ([address]  S/p AF/ AFL ablation in 10/2017  Maintaining sinus rhythm per device interrogation in 12/20, trending SB, reported fatigue 12/20 appt. We decreased Coreg to 12.5mg bid     3. Hypertrophic cardiomyopathy ([address]  Echo done in 9/2018 with preserved ejection fraction 55-60% with no evidence of hypertrophic myocardium  On BB, SRB, Lasix, and Spironolactone. At his appt in December he reported worsening edema.  We increased Lasix to 80mg bid for a week to see if edema  improved, then return to 40mg daily. Continued other meds.     4. Mixed hyperlipidemia  LDL 99 in 1/2020  Continue statin therapy and low fat, low cholesterol diet  Labs ordered by our office 1/20, will order now     5. S/p implantable loop recorder  Continue with routine follow-up with Dr. Gómez Nixon to be doing better with current regimen. Will check reflux study due to edema. F/u with me in 6 months.

## 2021-01-26 NOTE — PROGRESS NOTES
Chief Complaint   Patient presents with    Irregular Heart Beat     1m f/u, Pt c/o bilateral leg swelling. irreg pulse when monitoring       1. Have you been to the ER, urgent care clinic since your last visit? Hospitalized since your last visit? No    2. Have you seen or consulted any other health care providers outside of the [address] [place] [address] since your last visit? Include any pap smears or colon screening.  No

## 2021-01-27 ENCOUNTER — TELEPHONE (OUTPATIENT)
Dept: CARDIOLOGY CLINIC | Age: 71
End: 2021-01-27

## 2021-01-27 DIAGNOSIS — R60.9 EDEMA, UNSPECIFIED TYPE: ICD-10-CM

## 2021-01-27 NOTE — TELEPHONE ENCOUNTER
----- Message from Yumi Baker MD sent at 1/26/2021  9:51 PM EST -----  Labs okay, kidney function stable.

## 2021-02-01 RX ORDER — CARVEDILOL 12.5 MG/1
12.5 TABLET ORAL 2 TIMES DAILY WITH MEALS
Qty: 180 TAB | Refills: 3 | Status: SHIPPED | OUTPATIENT
Start: 2021-02-01 | End: 2021-04-20 | Stop reason: SDUPTHER

## 2021-02-01 RX ORDER — CARVEDILOL 12.5 MG/1
12.5 TABLET ORAL 2 TIMES DAILY WITH MEALS
COMMUNITY
End: 2021-02-01 | Stop reason: DRUGHIGH

## 2021-02-01 NOTE — TELEPHONE ENCOUNTER
Patient call for rx refill for: carvediloL (COREG) 25 mg tablet, patient wants to know if this medication comes in a 12.5 mg due to him taking a 1/2 pill per day. Patient also wants to get a 90 day rx sent to [facility] mail order on file. Please call.      Thanks

## 2021-02-10 ENCOUNTER — ANCILLARY PROCEDURE (OUTPATIENT)
Dept: CARDIOLOGY CLINIC | Age: 71
End: 2021-02-10
Payer: MEDICARE

## 2021-02-10 PROCEDURE — 93970 EXTREMITY STUDY: CPT | Performed by: INTERNAL MEDICINE

## 2021-02-11 LAB
LEFT GSV AT KNEE DIAM: 0.47 CM
LEFT GSV AT KNEE RFX: 0 S
LEFT GSV BK MID DIAM: 0.42 CM
LEFT GSV BK MID RFX: 1798 S
LEFT GSV JUNC DIAM: 0.76 CM
LEFT GSV JUNC RFX: 0 S
LEFT GSV THIGH PROX DIAM: 0.61 CM
LEFT GSV THIGH PROX RFX: 0 S
LEFT PERFORATOR DIAM: 0.27 CM
LEFT PERFORATOR RFX: 0 S
LEFT SSV PROX DIAM: 0.23 CM
LEFT SSV PROX RFX: 0 S
RIGHT GSV AK RFX: 0 S
RIGHT GSV AT KNEE DIAM: 0.34 CM
RIGHT GSV BK MID DIAM: 0.41 CM
RIGHT GSV BK MID RFX: 2148 S
RIGHT GSV JUNC DIAM: 0.99 CM
RIGHT GSV JUNC RFX: 0 S
RIGHT GSV THIGH PROX DIAM: 0.58 CM
RIGHT GSV THIGH PROX RFX: 0 S
RIGHT SSV PROX DIAM: 0.37 CM
RIGHT SSV PROX RFX: 599 S

## 2021-02-12 ENCOUNTER — TELEPHONE (OUTPATIENT)
Dept: CARDIOLOGY CLINIC | Age: 71
End: 2021-02-12

## 2021-02-12 NOTE — TELEPHONE ENCOUNTER
----- Message from Inocente Copeland MD sent at 2/11/2021 11:45 AM EST -----  Venous reflux study abnormal. Please schedule f/u with Dr. Ismael Elliott to discuss.  thx.

## 2021-03-01 ENCOUNTER — TELEPHONE (OUTPATIENT)
Dept: CARDIOLOGY CLINIC | Age: 71
End: 2021-03-01

## 2021-03-01 NOTE — TELEPHONE ENCOUNTER
Called pt,verified pt with two pt identifiers, pt advised his feet are swelling the lf is worse than the right. He notes it is really swollen at night when going to bed but appears better in am and gradually gets worse over the course of the day. He does wear compression stocking but sometimes it seems to make it worse. He does elevate legs but not all the time and does not think it works for him. He denies any changes in sodium intake and has in fact cut back. No other cardiac symptoms. He has f/u with Howard on 3/9/21 for venous doppler results and is wondering if he can be seen sooner. Advised pt that is as soon as he will be able to get in. Advised I would send note to NP for any further advise. Pt verbalized understanding.

## 2021-03-01 NOTE — TELEPHONE ENCOUNTER
Patient called to advised increase in foot swelling from his arch to this toes, need advise. Please call.      Thanks

## 2021-03-01 NOTE — TELEPHONE ENCOUNTER
Message  Received: Today  Message Contents   José Miguel Muller NP sent to Jodie Boateng LPN   Caller: Unspecified (Today,  9:49 AM)             Have him decrease amlodipine 5 mg daily   Leg swelling is mostly due to the leaky veins -- will discuss at OV with Dr. Hernan Clarke on 3/9. I don't want to adjust his furosemide because of his kidney function   For now, let's try cutting back on the amlodipine.  He should monitor his BP daily until his appt and we can see if we need to make any additional changes     Mandi Oglesby          Called pt and left message to call me back. Called pt,verified pt with two pt identifiers, advised pt our NP wants him to cut his Amlodipine in half-5 mg daily, as this medication can cause swelling. Advised his swelling Is likely coming from his leaky veins in his legs , which  will discuss at his upcoming visit. Advised to monitor his BP since cutting medication in half. Advised to keep his appt. Pt verbalized understanding and had no further questions.

## 2021-03-09 ENCOUNTER — OFFICE VISIT (OUTPATIENT)
Dept: CARDIOLOGY CLINIC | Age: 71
End: 2021-03-09
Payer: MEDICARE

## 2021-03-09 VITALS
OXYGEN SATURATION: 96 % | SYSTOLIC BLOOD PRESSURE: 110 MMHG | HEIGHT: 68 IN | BODY MASS INDEX: 32.07 KG/M2 | DIASTOLIC BLOOD PRESSURE: 70 MMHG | WEIGHT: 211.6 LBS | RESPIRATION RATE: 16 BRPM | HEART RATE: 56 BPM

## 2021-03-09 DIAGNOSIS — E78.2 MIXED HYPERLIPIDEMIA: ICD-10-CM

## 2021-03-09 DIAGNOSIS — I48.0 PAROXYSMAL ATRIAL FIBRILLATION (HCC): ICD-10-CM

## 2021-03-09 DIAGNOSIS — I10 ESSENTIAL HYPERTENSION: Primary | ICD-10-CM

## 2021-03-09 DIAGNOSIS — M79.89 LEG SWELLING: ICD-10-CM

## 2021-03-09 DIAGNOSIS — I87.2 VENOUS INSUFFICIENCY OF BOTH LOWER EXTREMITIES: ICD-10-CM

## 2021-03-09 PROCEDURE — G8752 SYS BP LESS 140: HCPCS | Performed by: INTERNAL MEDICINE

## 2021-03-09 PROCEDURE — G8536 NO DOC ELDER MAL SCRN: HCPCS | Performed by: INTERNAL MEDICINE

## 2021-03-09 PROCEDURE — 3017F COLORECTAL CA SCREEN DOC REV: CPT | Performed by: INTERNAL MEDICINE

## 2021-03-09 PROCEDURE — G8417 CALC BMI ABV UP PARAM F/U: HCPCS | Performed by: INTERNAL MEDICINE

## 2021-03-09 PROCEDURE — G8510 SCR DEP NEG, NO PLAN REQD: HCPCS | Performed by: INTERNAL MEDICINE

## 2021-03-09 PROCEDURE — G8754 DIAS BP LESS 90: HCPCS | Performed by: INTERNAL MEDICINE

## 2021-03-09 PROCEDURE — 1101F PT FALLS ASSESS-DOCD LE1/YR: CPT | Performed by: INTERNAL MEDICINE

## 2021-03-09 PROCEDURE — G0463 HOSPITAL OUTPT CLINIC VISIT: HCPCS | Performed by: INTERNAL MEDICINE

## 2021-03-09 PROCEDURE — 99204 OFFICE O/P NEW MOD 45 MIN: CPT | Performed by: INTERNAL MEDICINE

## 2021-03-09 PROCEDURE — G8427 DOCREV CUR MEDS BY ELIG CLIN: HCPCS | Performed by: INTERNAL MEDICINE

## 2021-03-09 NOTE — PROGRESS NOTES
1. Have you been to the ER, urgent care clinic since your last visit? Hospitalized since your last visit? No.    2. Have you seen or consulted any other health care providers outside of the [address] [place] [address] since your last visit? Include any pap smears or colon screening.    No.        Chief Complaint   Patient presents with    Results     discuss le venous doppler results- swelling in feet, numbness in toes, feet

## 2021-03-16 ENCOUNTER — OFFICE VISIT (OUTPATIENT)
Dept: CARDIOLOGY CLINIC | Age: 71
End: 2021-03-16
Payer: MEDICARE

## 2021-03-16 VITALS
BODY MASS INDEX: 32.01 KG/M2 | RESPIRATION RATE: 18 BRPM | SYSTOLIC BLOOD PRESSURE: 162 MMHG | WEIGHT: 211.2 LBS | HEIGHT: 68 IN | DIASTOLIC BLOOD PRESSURE: 94 MMHG | OXYGEN SATURATION: 98 % | HEART RATE: 47 BPM

## 2021-03-16 DIAGNOSIS — Z95.818 STATUS POST PLACEMENT OF IMPLANTABLE LOOP RECORDER: ICD-10-CM

## 2021-03-16 DIAGNOSIS — I87.2 VENOUS INSUFFICIENCY OF BOTH LOWER EXTREMITIES: ICD-10-CM

## 2021-03-16 DIAGNOSIS — I49.5 SSS (SICK SINUS SYNDROME) (HCC): ICD-10-CM

## 2021-03-16 DIAGNOSIS — I10 ESSENTIAL HYPERTENSION: ICD-10-CM

## 2021-03-16 DIAGNOSIS — E78.2 MIXED HYPERLIPIDEMIA: ICD-10-CM

## 2021-03-16 DIAGNOSIS — I48.0 PAROXYSMAL ATRIAL FIBRILLATION (HCC): Primary | ICD-10-CM

## 2021-03-16 PROCEDURE — G0463 HOSPITAL OUTPT CLINIC VISIT: HCPCS | Performed by: INTERNAL MEDICINE

## 2021-03-16 PROCEDURE — 93005 ELECTROCARDIOGRAM TRACING: CPT | Performed by: INTERNAL MEDICINE

## 2021-03-16 PROCEDURE — G8417 CALC BMI ABV UP PARAM F/U: HCPCS | Performed by: INTERNAL MEDICINE

## 2021-03-16 PROCEDURE — 3017F COLORECTAL CA SCREEN DOC REV: CPT | Performed by: INTERNAL MEDICINE

## 2021-03-16 PROCEDURE — G8536 NO DOC ELDER MAL SCRN: HCPCS | Performed by: INTERNAL MEDICINE

## 2021-03-16 PROCEDURE — 93010 ELECTROCARDIOGRAM REPORT: CPT | Performed by: INTERNAL MEDICINE

## 2021-03-16 PROCEDURE — G8427 DOCREV CUR MEDS BY ELIG CLIN: HCPCS | Performed by: INTERNAL MEDICINE

## 2021-03-16 PROCEDURE — 99215 OFFICE O/P EST HI 40 MIN: CPT | Performed by: INTERNAL MEDICINE

## 2021-03-16 PROCEDURE — 1101F PT FALLS ASSESS-DOCD LE1/YR: CPT | Performed by: INTERNAL MEDICINE

## 2021-03-16 PROCEDURE — G8753 SYS BP > OR = 140: HCPCS | Performed by: INTERNAL MEDICINE

## 2021-03-16 PROCEDURE — G8755 DIAS BP > OR = 90: HCPCS | Performed by: INTERNAL MEDICINE

## 2021-03-16 PROCEDURE — G8510 SCR DEP NEG, NO PLAN REQD: HCPCS | Performed by: INTERNAL MEDICINE

## 2021-03-16 RX ORDER — AMLODIPINE BESYLATE 2.5 MG/1
2.5 TABLET ORAL DAILY
Qty: 30 TAB | Refills: 1 | Status: SHIPPED | OUTPATIENT
Start: 2021-03-16 | End: 2021-04-28 | Stop reason: SDUPTHER

## 2021-03-16 RX ORDER — BISMUTH SUBSALICYLATE 262 MG
1 TABLET,CHEWABLE ORAL DAILY
COMMUNITY

## 2021-03-16 RX ORDER — LOSARTAN POTASSIUM 50 MG/1
25 TABLET ORAL DAILY
Qty: 90 TAB | Refills: 1
Start: 2021-03-16 | End: 2021-04-27 | Stop reason: SDUPTHER

## 2021-03-16 NOTE — PROGRESS NOTES
Chief Complaint   Patient presents with    Irregular Heart Beat     Annual follow up     Leg Swelling     lower legs      1. Have you been to the ER, urgent care clinic since your last visit? Hospitalized since your last visit? No     2. Have you seen or consulted any other health care providers outside of the [address] [place] [address] since your last visit? Include any pap smears or colon screening.   Yes pcp

## 2021-03-16 NOTE — LETTER
3/16/2021 Patient: Billy Lei YOB: 1950 Date of Visit: 3/16/2021 Miguel Leon, [address] [id number] Via Fax: [phone] Dear Miguel Leon MD, Thank you for referring Mr. Renee Mendoza to [address] [place] [address] for evaluation. My notes for this consultation are attached. If you have questions, please do not hesitate to call me. I look forward to following your patient along with you. Sincerely, Donovan Chowdhury MD

## 2021-03-16 NOTE — PROGRESS NOTES
ELECTROPHYSIOLOGY        Subjective:      Esther Sy is a 70 y.o. male is here for EP follow up. Pt denies chest pain, pressure, tightness, dyspnea. No falls/syncope. Not aware of fatigue. Bps have been going up since stopping his amlodipine. Has not improved his edema much.          Patient Active Problem List    Diagnosis Date Noted    Leg swelling 03/09/2021    Venous insufficiency of both lower extremities 03/09/2021    Status post placement of implantable loop recorder 07/14/2020    Hypertrophic cardiomyopathy ([address] 12/03/2014    Atrial fibrillation ([address] 11/12/2014    HTN (hypertension) 11/12/2014    Mixed hyperlipidemia 11/12/2014      Deidre Cheadle, MD  Past Medical History:   Diagnosis Date    Arrhythmia     a fib    Cancer ([address]     skin, throat cancer    Congestive heart failure ([address] 11/2020    hosp [address] tube feedings ([address]     PEG    High cholesterol     Hypertension     Hypertrophic cardiomyopathy ([address]     per cardiology notes 6/2013    Status post chemoradiation     completed 12/2013    Throat cancer [facility])       Past Surgical History:   Procedure Laterality Date    COLONOSCOPY N/A 1/17/2017    COLONOSCOPY performed by Lesly Boles MD at [facility] ENDOSCOPY    HX HEART CATHETERIZATION  2007    HX ORTHOPAEDIC      Rt. hand surgery    HX OTHER SURGICAL      exc. skin cancer    HX OTHER SURGICAL      peg tube inserted    HX TONSILLECTOMY      RI EGD BALLOON DILATION ESOPHAGUS <30 MM DIAM  11/8/2013          No Known Allergies   Family History   Problem Relation Age of Onset    Heart Attack Mother     Cancer Father     negative for cardiac disease  Social History     Socioeconomic History    Marital status:      Spouse name: Not on file    Number of children: Not on file    Years of education: Not on file    Highest education level: Not on file   Tobacco Use    Smoking status: Never Smoker    Smokeless tobacco: Former User     Types: Chew Substance and Sexual Activity    Alcohol use: Yes     Frequency: 2-3 times a week     Comment: once a week- a glass or two of moonshine     Drug use: No    Sexual activity: Not Currently     Current Outpatient Medications   Medication Sig    multivitamin (ONE A DAY) tablet Take 1 Tab by mouth daily.  carvediloL (Coreg) 12.5 mg tablet Take 1 Tab by mouth two (2) times daily (with meals).  losartan (COZAAR) 25 mg tablet Take 1 Tab by mouth daily. (Patient taking differently: Take 25 mg by mouth daily. Takes to tabs to = 50 mg daily)    furosemide (LASIX) 40 mg tablet TAKE ONE TABLET BY MOUTH ONE TIME DAILY     spironolactone (ALDACTONE) 25 mg tablet TAKE ONE TABLET BY MOUTH TWICE DAILY     lovastatin (MEVACOR) 40 mg tablet TAKE 1 TABLET DAILY    aspirin (ASPIRIN) 325 mg tablet Take 325 mg by mouth daily.  potassium chloride (K-DUR, KLOR-CON) 20 mEq tablet TAKE ONE TABLET BY MOUTH TWICE DAILY     levothyroxine (SYNTHROID) 50 mcg tablet Take  by mouth Daily (before breakfast).  cholecalciferol, vitamin D3, (VITAMIN D3) 2,000 unit tab Take 2,000 Units by mouth daily. Indications: PREVENTION OF VITAMIN D DEFICIENCY    amLODIPine (NORVASC) 10 mg tablet Take one tablet by mouth once daily. (Patient taking differently: Take 5 mg by mouth daily. Take one tablet by mouth once daily.)     No current facility-administered medications for this visit. Vitals:    03/16/21 0954 03/16/21 1008   BP: (!) 162/92 (!) 162/94   Pulse: (!) 47    Resp: 18    SpO2: 98%    Weight: 211 lb 3.2 oz (95.8 kg)    Height: 5' 8\" (1.727 m)        I have reviewed the nurses notes, vitals, problem list, allergy list, medical history, family, social history and medications. Review of Symptoms:    General: Pt denies excessive weight gain or loss. Pt is able to conduct ADL's  HEENT: Denies blurred vision, headaches, epistaxis and difficulty swallowing.   Respiratory: Denies shortness of breath, DUMONT, wheezing or stridor. Cardiovascular: +edema, Denies precordial pain, palpitations, or PND  Gastrointestinal: Denies poor appetite, indigestion, abdominal pain or blood in stool  Urinary: Denies dysuria, pyuria  Musculoskeletal: Denies pain or swelling from muscles or joints  Neurologic: Denies tremor, paresthesias, or sensory motor disturbance  Skin: Denies rash, itching or texture change. Psych: Denies depression      Physical Exam:      General: Well developed, in no acute distress. HEENT: Eyes - PERRL  Heart:  Normal S1/S2 negative S3 or S4. Regular, no murmur  Respiratory: Clear bilaterally x 4, no wheezing or rales  Extremities:  +2 b/l le edema, no cyanosis. Musculoskeletal: No clubbing  Neuro: A&Ox3, speech clear  Skin: No visible rashes or lesions. Non diaphoretic. No visible ulcers  Vascular: 2+ pulses symmetric in all extremities  Psych - judgement intact and orientation is wnl       Cardiographics    Ek21  Marked sinus  Bradycardia   Voltage criteria for LVH   Ventricular rate 48    Results for orders placed or performed in visit on 18   CARDIAC HOLTER MONITOR, 24 HOURS    Narrative    ECG Monitor/24 hours, Complete    Reason for Holter Monitor  BRADYCARDIA    Heartbeat    Slowest 37  Average 58  Fastest  118      Results:   Underlying Rhythm: Sinus bradycardia      Atrial Arrhythmias: premature atrial contractions; occasional, atrial couplets, paroxysmal supraventricular tachycardia and severe bradycardia            AV Conduction: normal    Ventricular Arrhythmias: premature ventricular contractions; occasional and ventricular couplets     ST Segment Analysis:normal     Symptom Correlation:  none    Comment:   Sinus bradycardia with profound bradycardia to 36 bpm with short burst of PAT and ventricular ectopy.  Clinical correlation advised     Veda Gaitan MD, Kandy José      Results for orders placed or performed during the hospital encounter of 10/25/17   EKG, 12 LEAD, INITIAL   Result Value Ref Range    Ventricular Rate 69 BPM    Atrial Rate 69 BPM    P-R Interval 170 ms    QRS Duration 88 ms    Q-T Interval 436 ms    QTC Calculation (Bezet) 467 ms    Calculated P Axis 75 degrees    Calculated R Axis 53 degrees    Calculated T Axis -148 degrees    Diagnosis       Normal sinus rhythm  Left ventricular hypertrophy with repolarization abnormality  When compared with ECG of 05-OCT-2017 10:23,  ST more depressed in Inferior leads  ST now depressed in Anterior leads  Confirmed by Mariam Kinsey ([id number]) on 10/26/2017 3:07:29 PM           Lab Results   Component Value Date/Time    WBC 4.6 06/14/2018 01:05 PM    HGB 14.6 06/14/2018 01:05 PM    HCT 41.5 06/14/2018 01:05 PM    PLATELET 605 22/90/7386 01:05 PM    MCV 92.6 06/14/2018 01:05 PM      Lab Results   Component Value Date/Time    Sodium 144 01/25/2021 10:26 AM    Potassium 4.5 01/25/2021 10:26 AM    Chloride 106 01/25/2021 10:26 AM    CO2 27 01/25/2021 10:26 AM    Anion gap 6 06/14/2018 01:05 PM    Glucose 92 01/25/2021 10:26 AM    BUN 27 01/25/2021 10:26 AM    Creatinine 1.39 (H) 01/25/2021 10:26 AM    BUN/Creatinine ratio 19 01/25/2021 10:26 AM    GFR est AA 59 (L) 01/25/2021 10:26 AM    GFR est non-AA 51 (L) 01/25/2021 10:26 AM    Calcium 9.6 01/25/2021 10:26 AM    Bilirubin, total 0.6 01/09/2020 10:34 AM    Alk. phosphatase 60 01/09/2020 10:34 AM    Protein, total 6.6 01/09/2020 10:34 AM    Albumin 4.1 01/09/2020 10:34 AM    Globulin 3.1 06/14/2018 01:05 PM    A-G Ratio 1.6 01/09/2020 10:34 AM    ALT (SGPT) 15 01/09/2020 10:34 AM      Lab Results   Component Value Date/Time    TSH 1.960 03/31/2015 12:45 PM           Assessment:           ICD-10-CM ICD-9-CM    1. Paroxysmal atrial fibrillation (HCC)  I48.0 427.31 AMB POC EKG ROUTINE W/ 12 LEADS, INTER & REP   2. SSS (sick sinus syndrome) (HCC)  I49.5 427.81    3. Status post placement of implantable loop recorder  Z95.818 V45.09    4. Venous insufficiency of both lower extremities  I87.2 459.81    5. Essential hypertension  I10 401.9      Orders Placed This Encounter    AMB POC EKG ROUTINE W/ 12 LEADS, INTER & REP     Order Specific Question:   Reason for Exam:     Answer:   routine    multivitamin (ONE A DAY) tablet     Sig: Take 1 Tab by mouth daily. Plan:     Chase Camp is in marked sinus bradycardia. He had AF in the setting of an chf exacerbation. He is unsure of which meds he is on after hospitalization at [address] He will get a list and get back to us. He admits to fatigue and we discussed a pacemaker for his sick sinus but he is hesitant to proceed down that road - feels his bradycardia is medicine related. He has not had any syncope. Bps not well controlled off CCB. Will restart at 2.5 mg as stopping has not improved edema. Monitor bps at home. Check bmp in 1 mo as he is on higher dose of ARB.    Continue medical management for AF, htn and hyperlipidemia. Addressed all patient questions and concerns at this visit. Thank you for allowing me to participate in Chase Camp 's care. Lon Arroyo NP    Patient seen and examined by me with nurse practitioner. I personally performed all components of the history, physical, and medical decision making and agree with the assessment and plan with minor modifications as noted. Marked sinus jonathon. Asymptomatic - no syncope/dizziness. bp has increased and edema has not improved. Will add back amlodipine at a lower dose. Will check bmp to monitor cr on higher dose of arb. Cont med rx for htn and hyperlipidemia. Stable and compliant on lower dose of bb therapy. Discussed he needs to notify us if he begins to feel lightheaded or dizzy. F/u in 6 months.      Corinne Poot, MD, [facility], [facility]          On this date 03/16/2021 I have spent 48 minutes reviewing previous notes, test results and face to face with the patient discussing the diagnosis and importance of compliance with the treatment plan as well as documenting on the day of the visit. Patient was made aware during visit today that all testing completed would be instantaneously available on their MyChart for review. Discussed that these results will be made available to the provider at the same time. They were advised to wait at least 3 business days to allow for provider's interpretation of results with follow-up before calling our office with concerns about their results.

## 2021-03-19 ENCOUNTER — OFFICE VISIT (OUTPATIENT)
Dept: CARDIOLOGY CLINIC | Age: 71
End: 2021-03-19
Payer: MEDICARE

## 2021-03-19 DIAGNOSIS — I48.0 PAROXYSMAL ATRIAL FIBRILLATION (HCC): Primary | ICD-10-CM

## 2021-03-19 DIAGNOSIS — I49.5 SSS (SICK SINUS SYNDROME) (HCC): ICD-10-CM

## 2021-03-19 PROCEDURE — 93298 REM INTERROG DEV EVAL SCRMS: CPT | Performed by: INTERNAL MEDICINE

## 2021-04-20 ENCOUNTER — TELEPHONE (OUTPATIENT)
Dept: CARDIOLOGY CLINIC | Age: 71
End: 2021-04-20

## 2021-04-20 RX ORDER — CARVEDILOL 12.5 MG/1
12.5 TABLET ORAL 2 TIMES DAILY WITH MEALS
Qty: 180 TAB | Refills: 3 | Status: SHIPPED | OUTPATIENT
Start: 2021-04-20 | End: 2021-04-27 | Stop reason: SDUPTHER

## 2021-04-20 NOTE — TELEPHONE ENCOUNTER
Called pt,verified pt with two pt identifiers, advised pt he has refills remaining on Coreg for mail order service. Pt advised he can get it cheaper at local pharmacy. Advised I will send to his local pharmacy. Pt verbalized understanding. Sent refill to NP for approval and to send to pharmacy.

## 2021-04-20 NOTE — TELEPHONE ENCOUNTER
Please refill for     Carvedilol 12.5 mg for 90 DAY SUPPLY    To [facility] in [address]

## 2021-04-27 ENCOUNTER — OFFICE VISIT (OUTPATIENT)
Dept: CARDIOLOGY CLINIC | Age: 71
End: 2021-04-27
Payer: MEDICARE

## 2021-04-27 VITALS
BODY MASS INDEX: 31.17 KG/M2 | RESPIRATION RATE: 18 BRPM | WEIGHT: 205.7 LBS | HEIGHT: 68 IN | DIASTOLIC BLOOD PRESSURE: 60 MMHG | HEART RATE: 47 BPM | SYSTOLIC BLOOD PRESSURE: 108 MMHG | OXYGEN SATURATION: 98 %

## 2021-04-27 DIAGNOSIS — E78.2 MIXED HYPERLIPIDEMIA: ICD-10-CM

## 2021-04-27 DIAGNOSIS — I48.0 PAROXYSMAL ATRIAL FIBRILLATION (HCC): Primary | ICD-10-CM

## 2021-04-27 DIAGNOSIS — I10 ESSENTIAL HYPERTENSION: ICD-10-CM

## 2021-04-27 DIAGNOSIS — I42.2 HYPERTROPHIC CARDIOMYOPATHY (HCC): ICD-10-CM

## 2021-04-27 PROCEDURE — G8510 SCR DEP NEG, NO PLAN REQD: HCPCS | Performed by: INTERNAL MEDICINE

## 2021-04-27 PROCEDURE — G8752 SYS BP LESS 140: HCPCS | Performed by: INTERNAL MEDICINE

## 2021-04-27 PROCEDURE — G8417 CALC BMI ABV UP PARAM F/U: HCPCS | Performed by: INTERNAL MEDICINE

## 2021-04-27 PROCEDURE — 99214 OFFICE O/P EST MOD 30 MIN: CPT | Performed by: INTERNAL MEDICINE

## 2021-04-27 PROCEDURE — 93005 ELECTROCARDIOGRAM TRACING: CPT | Performed by: INTERNAL MEDICINE

## 2021-04-27 PROCEDURE — 3017F COLORECTAL CA SCREEN DOC REV: CPT | Performed by: INTERNAL MEDICINE

## 2021-04-27 PROCEDURE — G0463 HOSPITAL OUTPT CLINIC VISIT: HCPCS | Performed by: INTERNAL MEDICINE

## 2021-04-27 PROCEDURE — G8536 NO DOC ELDER MAL SCRN: HCPCS | Performed by: INTERNAL MEDICINE

## 2021-04-27 PROCEDURE — 93010 ELECTROCARDIOGRAM REPORT: CPT | Performed by: INTERNAL MEDICINE

## 2021-04-27 PROCEDURE — 1101F PT FALLS ASSESS-DOCD LE1/YR: CPT | Performed by: INTERNAL MEDICINE

## 2021-04-27 PROCEDURE — G8754 DIAS BP LESS 90: HCPCS | Performed by: INTERNAL MEDICINE

## 2021-04-27 PROCEDURE — G8427 DOCREV CUR MEDS BY ELIG CLIN: HCPCS | Performed by: INTERNAL MEDICINE

## 2021-04-27 RX ORDER — LOSARTAN POTASSIUM 50 MG/1
50 TABLET ORAL DAILY
Qty: 90 TAB | Refills: 3 | Status: SHIPPED | OUTPATIENT
Start: 2021-04-27 | End: 2022-03-07

## 2021-04-27 RX ORDER — CARVEDILOL 6.25 MG/1
6.25 TABLET ORAL 2 TIMES DAILY WITH MEALS
Qty: 180 TAB | Refills: 3 | Status: SHIPPED | OUTPATIENT
Start: 2021-04-27 | End: 2021-06-22

## 2021-04-27 NOTE — PROGRESS NOTES
Liudmila Stone, [facility]    Subjective/HPI:     Cierra Duncan is a 70 y.o. male is here for routine f/u. He has a PMHx of PAF s/p ablation with ILR, HTN, HLD and hx of hypertrophic cardiomyopathy. Doing well. Still complains of fatigue, reports HR at home remain low. BP has been better since increasing losartan and leg swelling has nearly resolved since reducing amlodipine. Current Outpatient Medications on File Prior to Visit   Medication Sig Dispense Refill    carvediloL (Coreg) 12.5 mg tablet Take 1 Tab by mouth two (2) times daily (with meals). 180 Tab 3    multivitamin (ONE A DAY) tablet Take 1 Tab by mouth daily.  losartan (COZAAR) 50 mg tablet Take 0.5 Tabs by mouth daily. 90 Tab 1    amLODIPine (NORVASC) 2.5 mg tablet Take 1 Tab by mouth daily. 30 Tab 1    furosemide (LASIX) 40 mg tablet TAKE ONE TABLET BY MOUTH ONE TIME DAILY  30 Tab 3    spironolactone (ALDACTONE) 25 mg tablet TAKE ONE TABLET BY MOUTH TWICE DAILY  28 Tab 0    lovastatin (MEVACOR) 40 mg tablet TAKE 1 TABLET DAILY 90 Tab 3    aspirin (ASPIRIN) 325 mg tablet Take 325 mg by mouth daily.  potassium chloride (K-DUR, KLOR-CON) 20 mEq tablet TAKE ONE TABLET BY MOUTH TWICE DAILY  180 Tab 0    levothyroxine (SYNTHROID) 50 mcg tablet Take  by mouth Daily (before breakfast).  cholecalciferol, vitamin D3, (VITAMIN D3) 2,000 unit tab Take 2,000 Units by mouth daily. Indications: PREVENTION OF VITAMIN D DEFICIENCY       No current facility-administered medications on file prior to visit. Review of Symptoms:    Review of Systems   Constitutional: Positive for malaise/fatigue. Negative for chills, fever and weight loss. HENT: Negative for nosebleeds. Eyes: Negative for blurred vision and double vision. Respiratory: Negative for cough, shortness of breath and wheezing. Cardiovascular: Negative for chest pain, palpitations, orthopnea, leg swelling and PND.    Gastrointestinal: Negative for abdominal pain, blood in stool, diarrhea, nausea and vomiting. Musculoskeletal: Negative for joint pain. Skin: Negative for rash. Neurological: Negative for dizziness, tingling and loss of consciousness. Endo/Heme/Allergies: Does not bruise/bleed easily. Physical Exam:      General: Well developed, in no acute distress, cooperative and alert  Heart:  reg rate and rhythm; normal S1/S2; no murmurs, no gallops or rubs. Respiratory: Clear bilaterally x 4, no wheezing or rales  Extremities:  Normal cap refill, no cyanosis, atraumatic. No edema. Vascular: 2+ pulses symmetric in all extremities    Vitals:    04/27/21 1127   BP: 108/60   Pulse: (!) 47   Resp: 18   SpO2: 98%   Weight: 205 lb 11.2 oz (93.3 kg)   Height: 5' 8\" (1.727 m)       ECG done today shows sinus bradycardia     Assessment:       ICD-10-CM ICD-9-CM    1. Paroxysmal atrial fibrillation (HCC)  I48.0 427.31 AMB POC EKG ROUTINE W/ 12 LEADS, INTER & REP   2. Hypertrophic cardiomyopathy (HCC)  I42.2 425.18    3. Mixed hyperlipidemia  E78.2 272.2 LIPID PANEL      METABOLIC PANEL, COMPREHENSIVE   4. Essential hypertension  I10 401.9         Plan:     1. Paroxysmal atrial fibrillation (HCC)  S/p AF/ AFL ablation in 10/2017  Maintaining sinus rhythm  Continues to complain of fatigue, will decrease carvedilol 6.25 mg BID  May require PPM in the future if he remains symptomatic with BB    2. Hypertrophic cardiomyopathy ([facility] [address]  Echo done in 9/2018 with preserved ejection fraction 55-60% with no evidence of hypertrophic myocardium  On BB, ARB, Lasix, and Spironolactone. 3. Essential hypertension  BP well controlled. Continue BB, losartan, aldactone, amlodipine.   Higher dose of amlodipine with worsening leg edema  Renal artery duplex 11/2020 without evidence of NENO     4. Mixed hyperlipidemia  LDL 99 in 1/2020  Continue statin therapy and low fat, low cholesterol diet  Repeat lipid panel this year     5.  S/p implantable loop recorder  Continue with routine follow-up with Dr. Stephanie Khoury    F/u with Dr. Rosy Conway in 6 months    Melonie Galdamez NP       [facility]    4/27/2021         Patient seen, examined by me personally. Plan discussed as detailed. Agree with note as outlined by  NP with modifications as noted. My independent physical exam reveals : Physical Exam   Constitutional: He is oriented to person, place, and time. He appears well-developed and well-nourished. HENT:   Head: Normocephalic. Eyes: Conjunctivae are normal.   Neck: Neck supple. Cardiovascular: Normal rate, regular rhythm and normal heart sounds. Pulmonary/Chest: Effort normal and breath sounds normal.   Musculoskeletal:         General: No edema. Neurological: He is alert and oriented to person, place, and time. Skin: Skin is warm and dry. Psychiatric: He has a normal mood and affect. Nursing note and vitals reviewed. No additional findings noted. Agree with plan as outlined above with modifications as noted.      Dalton Ray MD

## 2021-04-27 NOTE — LETTER
4/27/2021 Patient: Ej Cintron YOB: 1950 Date of Visit: 4/27/2021 Cornelius Pham, [address] [address] Via Fax: [phone] Dear Cornelius hPam MD, Thank you for referring Mr. Ofelia Grigsby to [address] [place] [address] for evaluation. My notes for this consultation are attached. If you have questions, please do not hesitate to call me. I look forward to following your patient along with you. Sincerely, Julianne Adam MD

## 2021-04-27 NOTE — PROGRESS NOTES
Chief Complaint   Patient presents with    Irregular Heart Beat     3 Month Follow Up; Denies Cardiac Symptoms     Visit Vitals  /60 (BP 1 Location: Left arm, BP Patient Position: Sitting, BP Cuff Size: Adult)   Pulse (!) 47   Resp 18   Ht 5' 8\" (1.727 m)   Wt 205 lb 11.2 oz (93.3 kg)   SpO2 98%   BMI 31.28 kg/m²     1. Have you been to the ER, urgent care clinic since your last visit? Hospitalized since your last visit? No    2. Have you seen or consulted any other health care providers outside of the [address] [place] [address] since your last visit? Include any pap smears or colon screening.  No

## 2021-04-28 RX ORDER — AMLODIPINE BESYLATE 2.5 MG/1
2.5 TABLET ORAL DAILY
Qty: 90 TAB | Refills: 3 | Status: SHIPPED | OUTPATIENT
Start: 2021-04-28 | End: 2022-04-04 | Stop reason: SDUPTHER

## 2021-04-28 NOTE — TELEPHONE ENCOUNTER
Incoming Fax from [facility] requesting refill on Amlodipine 2.5mg tablets.  Last Prescribed on 03/16/2021  Last Office Visit 04/27/2021  Labs Ordered 04/27/2021

## 2021-05-07 ENCOUNTER — TELEPHONE (OUTPATIENT)
Dept: CARDIOLOGY CLINIC | Age: 71
End: 2021-05-07

## 2021-05-07 NOTE — TELEPHONE ENCOUNTER
Returned call, verified pt with two pt identifiers, he advised he faxed over his updated med list but thinks we do not have his meds correct and wanted to go over them. He read out meds and I updated in cc. Updated med list in cc. Pt verbalized understanding.

## 2021-06-18 ENCOUNTER — TELEPHONE (OUTPATIENT)
Dept: CARDIOLOGY CLINIC | Age: 71
End: 2021-06-18

## 2021-06-18 ENCOUNTER — OFFICE VISIT (OUTPATIENT)
Dept: CARDIOLOGY CLINIC | Age: 71
End: 2021-06-18
Payer: MEDICARE

## 2021-06-18 DIAGNOSIS — I48.0 PAROXYSMAL ATRIAL FIBRILLATION (HCC): Primary | ICD-10-CM

## 2021-06-18 DIAGNOSIS — Z45.09 ENCOUNTER FOR LOOP RECORDER CHECK: ICD-10-CM

## 2021-06-18 PROCEDURE — 93298 REM INTERROG DEV EVAL SCRMS: CPT | Performed by: INTERNAL MEDICINE

## 2021-06-18 NOTE — TELEPHONE ENCOUNTER
Verified patient with two identifiers. Pt informed his link recorder is EOS since June 16,2021. We will continue to monitor him for another month to see if any abnormalities transmit. If not we will end the service and he will be instructed to unplug the monitor and return it to us. He will also be informed to keep us posted on any change in symptoms and follow up routinely. Pt verbalized understanding.

## 2021-06-22 ENCOUNTER — TELEPHONE (OUTPATIENT)
Dept: CARDIOLOGY CLINIC | Age: 71
End: 2021-06-22

## 2021-06-22 ENCOUNTER — OFFICE VISIT (OUTPATIENT)
Dept: CARDIOLOGY CLINIC | Age: 71
End: 2021-06-22
Payer: MEDICARE

## 2021-06-22 VITALS
DIASTOLIC BLOOD PRESSURE: 96 MMHG | RESPIRATION RATE: 16 BRPM | WEIGHT: 205.4 LBS | SYSTOLIC BLOOD PRESSURE: 150 MMHG | HEIGHT: 68 IN | HEART RATE: 47 BPM | OXYGEN SATURATION: 98 % | BODY MASS INDEX: 31.13 KG/M2

## 2021-06-22 DIAGNOSIS — E78.2 MIXED HYPERLIPIDEMIA: ICD-10-CM

## 2021-06-22 DIAGNOSIS — I87.2 VENOUS INSUFFICIENCY OF BOTH LOWER EXTREMITIES: Primary | ICD-10-CM

## 2021-06-22 DIAGNOSIS — M79.89 LEG SWELLING: ICD-10-CM

## 2021-06-22 DIAGNOSIS — I10 ESSENTIAL HYPERTENSION: ICD-10-CM

## 2021-06-22 PROCEDURE — G0463 HOSPITAL OUTPT CLINIC VISIT: HCPCS | Performed by: INTERNAL MEDICINE

## 2021-06-22 PROCEDURE — 1101F PT FALLS ASSESS-DOCD LE1/YR: CPT | Performed by: INTERNAL MEDICINE

## 2021-06-22 PROCEDURE — 3017F COLORECTAL CA SCREEN DOC REV: CPT | Performed by: INTERNAL MEDICINE

## 2021-06-22 PROCEDURE — G8754 DIAS BP LESS 90: HCPCS | Performed by: INTERNAL MEDICINE

## 2021-06-22 PROCEDURE — G8536 NO DOC ELDER MAL SCRN: HCPCS | Performed by: INTERNAL MEDICINE

## 2021-06-22 PROCEDURE — G8417 CALC BMI ABV UP PARAM F/U: HCPCS | Performed by: INTERNAL MEDICINE

## 2021-06-22 PROCEDURE — G8510 SCR DEP NEG, NO PLAN REQD: HCPCS | Performed by: INTERNAL MEDICINE

## 2021-06-22 PROCEDURE — 99214 OFFICE O/P EST MOD 30 MIN: CPT | Performed by: INTERNAL MEDICINE

## 2021-06-22 PROCEDURE — G8753 SYS BP > OR = 140: HCPCS | Performed by: INTERNAL MEDICINE

## 2021-06-22 PROCEDURE — G8427 DOCREV CUR MEDS BY ELIG CLIN: HCPCS | Performed by: INTERNAL MEDICINE

## 2021-06-22 RX ORDER — CARVEDILOL 12.5 MG/1
12.5 TABLET ORAL 2 TIMES DAILY WITH MEALS
Qty: 90 TABLET | Refills: 5 | Status: SHIPPED | OUTPATIENT
Start: 2021-06-22 | End: 2021-06-22

## 2021-06-22 RX ORDER — ISOSORBIDE MONONITRATE 30 MG/1
30 TABLET, EXTENDED RELEASE ORAL DAILY
Qty: 30 TABLET | Refills: 3 | Status: SHIPPED | OUTPATIENT
Start: 2021-06-22 | End: 2021-07-21 | Stop reason: SDUPTHER

## 2021-06-22 RX ORDER — CARVEDILOL 6.25 MG/1
6.25 TABLET ORAL 2 TIMES DAILY WITH MEALS
Qty: 180 TABLET | Refills: 3 | Status: SHIPPED | OUTPATIENT
Start: 2021-06-22

## 2021-06-22 NOTE — TELEPHONE ENCOUNTER
----- Message from Marii Barnard MD sent at 6/22/2021  1:45 PM EDT -----  Can you call him and tell him not to increase coreg any further. Looks like previously his heart rate has been on lower side, don't want it to go any slower. I have called in imdur for him for BP and use that instead. Follow up with Dr. Hever Ambriz. Called pt, verified pt with two pt identifiers, advised pt that  did not want to increase the Coreg further due to his HR is on the low side and does not want to slow it down any further.  sent in Imdur 30 mg daily to pharmacy. Advised to monitor BP daily and if not helping his BP go down then call Dank Mcgraw for further control of BP. Advised pt to continue his Coreg as he was taking it, he will continue the Coreg. Pt verbalized understanding.

## 2021-06-22 NOTE — PROGRESS NOTES
6/22/2021 1:38 PM      Subjective:     Peggy Londono Jr leg swelling has resolved since lowering dose of norvasc. However BP is elevated now. He denies chest pain, chest pressure/discomfort, dyspnea, palpitations, irregular heart beats, near-syncope, syncope, fatigue, orthopnea, paroxysmal nocturnal dyspnea, exertional chest pressure/discomfort, claudication, tachypnea. Visit Vitals  BP (!) 150/96 (BP 1 Location: Right arm, BP Patient Position: Sitting, BP Cuff Size: Large adult)   Pulse (!) 47   Resp 16   Ht 5' 8\" (1.727 m)   Wt 205 lb 6.4 oz (93.2 kg)   SpO2 98%   BMI 31.23 kg/m²     Current Outpatient Medications   Medication Sig    carvediloL (Coreg) 12.5 mg tablet Take 1 Tablet by mouth two (2) times daily (with meals).  amLODIPine (NORVASC) 2.5 mg tablet Take 1 Tab by mouth daily.  losartan (COZAAR) 50 mg tablet Take 1 Tab by mouth daily.  multivitamin (ONE A DAY) tablet Take 1 Tab by mouth daily.  furosemide (LASIX) 40 mg tablet TAKE ONE TABLET BY MOUTH ONE TIME DAILY     spironolactone (ALDACTONE) 25 mg tablet TAKE ONE TABLET BY MOUTH TWICE DAILY     lovastatin (MEVACOR) 40 mg tablet TAKE 1 TABLET DAILY    aspirin (ASPIRIN) 325 mg tablet Take 325 mg by mouth daily.  potassium chloride (K-DUR, KLOR-CON) 20 mEq tablet TAKE ONE TABLET BY MOUTH TWICE DAILY  (Patient taking differently: Take 10 mEq by mouth. 2 tabs twice daily)    levothyroxine (SYNTHROID) 50 mcg tablet Take  by mouth Daily (before breakfast).  cholecalciferol, vitamin D3, (VITAMIN D3) 2,000 unit tab Take 2,000 Units by mouth daily. Indications: PREVENTION OF VITAMIN D DEFICIENCY     No current facility-administered medications for this visit.          Objective:      Visit Vitals  BP (!) 150/96 (BP 1 Location: Right arm, BP Patient Position: Sitting, BP Cuff Size: Large adult)   Pulse (!) 47   Resp 16   Ht 5' 8\" (1.727 m)   Wt 205 lb 6.4 oz (93.2 kg)   SpO2 98%   BMI 31.23 kg/m² Past Medical History:   Diagnosis Date    Arrhythmia     a fib    Cancer ([address]     skin, throat cancer    Congestive heart failure ([address] 11/2020    [facility]osp [facility]    G tube feedings ([facility] [address]     PEG    High cholesterol     Hypertension     Hypertrophic cardiomyopathy ([facility] [address]     per cardiology notes 6/2013    Status post chemoradiation     completed 12/2013    Throat cancer [facility])       Past Surgical History:   Procedure Laterality Date    COLONOSCOPY N/A 1/17/2017    COL[facility]COPY performed by [facility] at [facility]COPY    HX HEART CATHETERIZATION  2007    HX ORTHOPAEDIC      Rt. hand surgery    HX OTHER SURGICAL      exc. skin cancer    HX OTHER SURGICAL      peg tube inserted    HX TONSILLECTOMY      [place] EGD BALLOON DILATION ESOPHAGUS <30 MM DIAM  11/8/2013          No Known Allergies   Family History   Problem Relation Age of Onset    Heart Attack Mother     Cancer Father       Social History     Socioeconomic History    Marital status:      Spouse name: Not on file    Number of children: Not on file    Years of education: Not on file    Highest education level: Not on file   Occupational History    Not on file   Tobacco Use    Smoking status: Never Smoker    Smokeless tobacco: Former User     Types: Chew   Vaping Use    Vaping Use: Never used   Substance and Sexual Activity    Alcohol use: Yes     Comment: once a week- a glass or two of moonshine     Drug use: No    Sexual activity: Not Currently   Other Topics Concern    Not on file   Social History Narrative    Not on file     Social Determinants of Health     Financial Resource Strain:     Difficulty of Paying Living Expenses:    Food Insecurity:     Worried About Running Out of Food in the Last Year:     Ran Out of Food in the Last Year:    Transportation Needs:     Lack of Transportation (Medical):      Lack of Transportation (Non-Medical):    Physical Activity:     Days of Exercise per Week:     Minutes of Exercise per Session:    Stress:     Feeling of Stress :    Social Connections:     Frequency of Communication with Friends and Family:     Frequency of Social Gatherings with Friends and Family:     Attends Religion Services:     Active Member of Clubs or Organizations:     Attends Club or Organization Meetings:     Marital Status:    Intimate Partner Violence:     Fear of Current or Ex-Partner:     Emotionally Abused:     Physically Abused:     Sexually Abused:          Assessment:       ICD-10-CM ICD-9-CM    1. Venous insufficiency of both lower extremities  I87.2 459.81    2. Essential hypertension  I10 401.9    3. Leg swelling  M79.89 729.81    4. Mixed hyperlipidemia  E78.2 272.2        Plan:     1. LE swelling with LE venous insufficiency:   LE swelling has resolved with lowering of norvasc dose. No further intervention.      2. HTN: BP elevated. Add imdur. F/u with Dr. Conchis Sanz.      3. Paroxysmal atrial fibrillation [facility])  S/p AF/ AFL ablation in 10/2017     4.  Mixed hyperlipidemia  LDL 99 in 1/2020  Continue statin therapy and low fat, low cholesterol diet

## 2021-07-21 ENCOUNTER — TELEPHONE (OUTPATIENT)
Dept: CARDIOLOGY CLINIC | Age: 71
End: 2021-07-21

## 2021-07-21 RX ORDER — ISOSORBIDE MONONITRATE 30 MG/1
30 TABLET, EXTENDED RELEASE ORAL DAILY
Qty: 90 TABLET | Refills: 3 | Status: SHIPPED | OUTPATIENT
Start: 2021-07-21 | End: 2021-09-24 | Stop reason: SDUPTHER

## 2021-07-21 NOTE — TELEPHONE ENCOUNTER
Yuliana Cochran please advise    Pt is requesting a 90 day supply instead of 30 day supply of Imdur. Pt sees Howard for his legs but his BP was elevated so Howard prescribed Imdur but is to follow up with Vinayak. Can you refill for 90 day supply.  Please Advise

## 2021-07-21 NOTE — TELEPHONE ENCOUNTER
Verified patient with two patient identifiers. Spoke with patient. Confirmed refill was sent out 7-21-21 for 90 days through mail order. Patient verbalized understanding.

## 2021-07-21 NOTE — TELEPHONE ENCOUNTER
Pt would like to know if his refills for his medication isosorbide mononitrate 30 mg can be 90 supply instead of 30 days. His pharmacy [facility] fax [phone]. Please call him back at [phone]

## 2021-08-11 ENCOUNTER — TELEPHONE (OUTPATIENT)
Dept: CARDIOLOGY CLINIC | Age: 71
End: 2021-08-11

## 2021-08-11 NOTE — TELEPHONE ENCOUNTER
Have him increase amlodipine to 5 mg daily. Call back in one week with daily BP readings.     Thanks,  Macarena Pak

## 2021-08-11 NOTE — TELEPHONE ENCOUNTER
Identified patient w/ two identifiers. The patient called and stated that his blood pressure has been elevated the last few days. Range -201 and DBP 95-98, HR 58-66. Last BP taken just now was  201/98. Reviewed MAR. Patient is taking all blood pressure medications as prescribed. He states that on June 22nd his Carvedilol was decreased to 6.25 mg due to his HR dropping to the 40s-50s. He states his HR has improved but since the decrease his blood pressures have steadily increased. Please advise. Patient voices no other complaints or concerns at this time.

## 2021-08-12 NOTE — TELEPHONE ENCOUNTER
Spoke with patient. Verified with two patient identifiers. Advised pt per [employer] NP, Have him increase amlodipine to 5 mg daily. Call back in one week with daily BP readings. Pt states that he doesn't know if that was a good idea to increase Amlodipine because he states that it was reduced recently for a reason unsure of what it was but didn't know if that was a good idea. Pt was wondering if maybe he would benefit from cardiac rehab states that he has a friend that goes there and the friend recommended it. Also pt wanted to know if NP thinks that he needs to come in for a f/u to further discuss as his next appt isnt until 11/1/21. Advised I would let NP know and call back. Patient verbalized understanding.

## 2021-08-12 NOTE — TELEPHONE ENCOUNTER
Spoke with patient. Verified with two patient identifiers. Advised pt per Peter Mckinley NP, He might have had increased leg swelling, but we normally see that at 10 mg doses.  If he is hesitant to increase amlodipine to 5 mg, that is fine. Instead, recommend to increase Imdur 60 mg daily. He does not have qualifying diagnosis for cardiac rehab -- for patients with stents/ recent heart attack or heart failure.  He does not have any of those issues.  Only HTN, A fib. Let's increase Imdur 60 mg daily first, and see how BP responds. Advised to call back in 1 week with BP readings and pulse readings.  If it helps, then we can just keep his 11/2021 appt. Jeny Lemos, we can see him sooner for additional titration of meds. Patient verbalized understanding.     Updated Imdur adjustment on med list.

## 2021-08-12 NOTE — TELEPHONE ENCOUNTER
Mandi MORAN  Pt agreed to the increase of the Imdur updated on med list. Pt also states that he has been concerned about his low pulse rate. Pt states that his pulse used to be in the 50's and today it was 42 and 46. Pt states that he will keep a track of his BP and Pulse and call us back in a week.

## 2021-08-12 NOTE — TELEPHONE ENCOUNTER
[employer] Please Advise        Pt states that he doesn't know if that was a good idea to increase Amlodipine because he states that it was reduced recently for a reason unsure of what it was but didn't know if that was a good idea. Pt was wondering if maybe he would benefit from cardiac rehab states that he has a friend that goes there and the friend recommended it. Also pt wanted to know if NP thinks that he needs to come in for a f/u to further discuss as his next appt isnt until 11/1/21.

## 2021-08-23 NOTE — TELEPHONE ENCOUNTER
Spoke with patient. Verified with two patient identifiers. Advised pt per Yoselin Burnette NP, I want him to get labs done to check on kidney function and potassium.  Based on that, I will know whether I can further increase Imdur or increase losartan instead, as it doesn't seem like Imdur has helped much. There are already orders in the system for labs. Please have him go to the hospital and get these done. Pt states that he will go to [facility] to get done maybe tomorrow. Pt would like the lab slip so he can take with him. Advised printed the lab slip and left at  so he can take with him. Patient verbalized understanding.

## 2021-08-23 NOTE — TELEPHONE ENCOUNTER
Spoke with patient. Verified with two patient identifiers. Spoke with pt in regards to BP readings and pulse readings since the increase of the Imdur to 60 mg. Pt states that his readings are as follows:    8/13/21    /72  Pulse 54  8/14/21    /82  Pulse 64  8/15/21    /88  Pulse 54  8/18/21    /70  Pulse 58  8/19/21    /66  Pulse 51    Advised I would let Np know and see what she recommends. Patient verbalized understanding.

## 2021-08-31 ENCOUNTER — TELEPHONE (OUTPATIENT)
Dept: CARDIOLOGY CLINIC | Age: 71
End: 2021-08-31

## 2021-08-31 LAB
ALBUMIN SERPL-MCNC: 4.2 G/DL (ref 3.7–4.7)
ALBUMIN/GLOB SERPL: 1.9 {RATIO} (ref 1.2–2.2)
ALP SERPL-CCNC: 52 IU/L (ref 48–121)
ALT SERPL-CCNC: 13 IU/L (ref 0–44)
AST SERPL-CCNC: 15 IU/L (ref 0–40)
BILIRUB SERPL-MCNC: 0.5 MG/DL (ref 0–1.2)
BUN SERPL-MCNC: 24 MG/DL (ref 8–27)
BUN/CREAT SERPL: 16 (ref 10–24)
CALCIUM SERPL-MCNC: 9.2 MG/DL (ref 8.6–10.2)
CHLORIDE SERPL-SCNC: 105 MMOL/L (ref 96–106)
CHOLEST SERPL-MCNC: 151 MG/DL (ref 100–199)
CO2 SERPL-SCNC: 27 MMOL/L (ref 20–29)
CREAT SERPL-MCNC: 1.5 MG/DL (ref 0.76–1.27)
GLOBULIN SER CALC-MCNC: 2.2 G/DL (ref 1.5–4.5)
GLUCOSE SERPL-MCNC: 119 MG/DL (ref 65–99)
HDLC SERPL-MCNC: 46 MG/DL
IMP & REVIEW OF LAB RESULTS: NORMAL
INTERPRETATION: NORMAL
LDLC SERPL CALC-MCNC: 91 MG/DL (ref 0–99)
POTASSIUM SERPL-SCNC: 4.9 MMOL/L (ref 3.5–5.2)
PROT SERPL-MCNC: 6.4 G/DL (ref 6–8.5)
SODIUM SERPL-SCNC: 143 MMOL/L (ref 134–144)
TRIGL SERPL-MCNC: 69 MG/DL (ref 0–149)
VLDLC SERPL CALC-MCNC: 14 MG/DL (ref 5–40)

## 2021-08-31 NOTE — TELEPHONE ENCOUNTER
[employer] Please Advise      Pt states that he would like for the doctor to start over from scratch with his medications. He states that he does not want to add another medication as he is already taking to many. He states that he does not want to increase the Amlodipine as it caused problems in the past. He states that he just increased the Imdur and doesn't know if he wants to increase that anymore. He also states that he does not want to add another medication as he is taking a lot of medications, so patient would like for doctor to go back and start from scratch with his medications and maybe take out the ones that have the negative side effects instead of just adding another medication.

## 2021-08-31 NOTE — TELEPHONE ENCOUNTER
Please call pt and schedule a f/u with Dr. Beni Lomax only not NP to discuss medication management. Thank You!

## 2021-08-31 NOTE — TELEPHONE ENCOUNTER
Spoke with patient. Verified with two patient identifiers. Advised pt per [employer] NP, have him come see Dr. Vish Gifford so we can discuss his options.  Schedule with MD only, please. Advised I would have  call him and schedule appt with Dr Vish Gifford. Patient verbalized understanding.

## 2021-08-31 NOTE — TELEPHONE ENCOUNTER
Spoke with patient. Verified with two patient identifiers. Advised pt per Mely Pérez NP,labs show kidney function is at baseline, but I don't want to increase losartan any higher, because it can worsen kidney function at this point. For BP, he now has three options: increase amlodipine 5 mg (which he was hesitant to do), increase Imdur further or add another agent. I would recommend increasing amlodipine -- I don't think he will have swelling like he did if he takes 5 mg. It will help with BP control and avoid another agent for now. I don't think Imdur is very effective in treating HTN right now, so that would be my last choice. Pt states that he would like for the doctor to start over from scratch with his medications. He states that he does not want to add another medication as he is already taking to many. He states that he does not want to increase the Amlodipine as it caused problems in the past. He states that he just increased the Imdur and doesn't know if he wants to increase that anymore. He also states that he does not want to add another medication as he is taking a lot of medications, so patient would like for doctor to go back and start from scratch with his medications and maybe take out the ones that have the negative side effects instead of just adding another medication. Advised I would let NP know and call back with what they advise. Patient verbalized understanding.

## 2021-08-31 NOTE — PROGRESS NOTES
Joelle Krabbe,    Please call patient and inform that labs show kidney function is at baseline, but I don't want to increase losartan any higher, because it can worsen kidney function at this point. For BP, he now has three options: increase amlodipine 5 mg (which he was hesitant to do), increase Imdur further or add another agent. I would recommend increasing amlodipine -- I don't think he will have swelling like he did if he takes 5 mg. It will help with BP control and avoid another agent for now. I don't think Imdur is very effective in treating HTN right now, so that would be my last choice. Let me know what he decides.     Thanks,  Peter Mckinley

## 2021-08-31 NOTE — TELEPHONE ENCOUNTER
----- Message from Calista Waters NP sent at 8/31/2021  1:46 PM EDT -----  [place],    Please call patient and inform that labs show kidney function is at baseline, but I don't want to increase losartan any higher, because it can worsen kidney function at this point. For BP, he now has three options: increase amlodipine 5 mg (which he was hesitant to do), increase Imdur further or add another agent. I would recommend increasing amlodipine -- I don't think he will have swelling like he did if he takes 5 mg. It will help with BP control and avoid another agent for now. I don't think Imdur is very effective in treating HTN right now, so that would be my last choice. Let me know what he decides.     Thanks,  [employer]

## 2021-09-07 ENCOUNTER — OFFICE VISIT (OUTPATIENT)
Dept: CARDIOLOGY CLINIC | Age: 71
End: 2021-09-07
Payer: MEDICARE

## 2021-09-07 VITALS
HEART RATE: 49 BPM | BODY MASS INDEX: 31.05 KG/M2 | WEIGHT: 204.9 LBS | DIASTOLIC BLOOD PRESSURE: 74 MMHG | RESPIRATION RATE: 18 BRPM | OXYGEN SATURATION: 97 % | SYSTOLIC BLOOD PRESSURE: 111 MMHG | HEIGHT: 68 IN

## 2021-09-07 DIAGNOSIS — I42.2 HYPERTROPHIC CARDIOMYOPATHY (HCC): ICD-10-CM

## 2021-09-07 DIAGNOSIS — I48.0 PAROXYSMAL ATRIAL FIBRILLATION (HCC): ICD-10-CM

## 2021-09-07 DIAGNOSIS — E78.2 MIXED HYPERLIPIDEMIA: ICD-10-CM

## 2021-09-07 DIAGNOSIS — I10 ESSENTIAL HYPERTENSION: Primary | ICD-10-CM

## 2021-09-07 PROCEDURE — G0463 HOSPITAL OUTPT CLINIC VISIT: HCPCS | Performed by: INTERNAL MEDICINE

## 2021-09-07 PROCEDURE — 1101F PT FALLS ASSESS-DOCD LE1/YR: CPT | Performed by: INTERNAL MEDICINE

## 2021-09-07 PROCEDURE — 93010 ELECTROCARDIOGRAM REPORT: CPT | Performed by: INTERNAL MEDICINE

## 2021-09-07 PROCEDURE — G8427 DOCREV CUR MEDS BY ELIG CLIN: HCPCS | Performed by: INTERNAL MEDICINE

## 2021-09-07 PROCEDURE — G8752 SYS BP LESS 140: HCPCS | Performed by: INTERNAL MEDICINE

## 2021-09-07 PROCEDURE — G8510 SCR DEP NEG, NO PLAN REQD: HCPCS | Performed by: INTERNAL MEDICINE

## 2021-09-07 PROCEDURE — G8754 DIAS BP LESS 90: HCPCS | Performed by: INTERNAL MEDICINE

## 2021-09-07 PROCEDURE — G8536 NO DOC ELDER MAL SCRN: HCPCS | Performed by: INTERNAL MEDICINE

## 2021-09-07 PROCEDURE — G8417 CALC BMI ABV UP PARAM F/U: HCPCS | Performed by: INTERNAL MEDICINE

## 2021-09-07 PROCEDURE — 93005 ELECTROCARDIOGRAM TRACING: CPT | Performed by: INTERNAL MEDICINE

## 2021-09-07 PROCEDURE — 99213 OFFICE O/P EST LOW 20 MIN: CPT | Performed by: INTERNAL MEDICINE

## 2021-09-07 PROCEDURE — 3017F COLORECTAL CA SCREEN DOC REV: CPT | Performed by: INTERNAL MEDICINE

## 2021-09-07 NOTE — PATIENT INSTRUCTIONS
Take your blood pressure medications in the following way:    AM:  - carvedilol 6.25 mg  - aldactone 25 mg  - isosorbide 60 mg  - amlodipine 2.5 mg    PM  - losartan 50 mg  - carvedilol 6.25 mg  - aldactone 25 mg

## 2021-09-07 NOTE — PROGRESS NOTES
Chief Complaint   Patient presents with    Follow-up     medicationadjustments    Cardiomyopathy     afib no cardiac pain     1. Have you been to the ER, urgent care clinic since your last visit? Hospitalized since your last visit? No  2. Have you seen or consulted any other health care providers outside of the [address] [place] [address] since your last visit? Include any pap smears or colon screening.  Yes PCP

## 2021-09-07 NOTE — LETTER
9/7/2021    Patient: Jovi Cuevas   YOB: 1950   Date of Visit: 9/7/2021     Ruth Brasher, [address] [address]  Via Fax: [phone]    Dear Ruth Brasher MD,      Thank you for referring Mr. Angela Pedraza to [address] [place] [address] for evaluation. My notes for this consultation are attached. If you have questions, please do not hesitate to call me. I look forward to following your patient along with you.       Sincerely,    Desire Schwartz MD

## 2021-09-07 NOTE — PROGRESS NOTES
Agnes Yarbrough, [facility]    Subjective/HPI:     Clare Malcolm is a 70 y.o. male is here for routine f/u. He has a PMHx of PAF s/p ablation with ILR, HTN, HLD and hx of hypertrophic cardiomyopathy. He has concerns of high blood pressure at home. Medications have been adjusted, but high BP persists. He has been resistant to starting additional agents because he feels he is on a lot of medications already. Has previously had side effects to higher doses of medications, and other med titrations have been limited due to CKD and bradycardia. He takes all his BP meds in the morning, except for the second doses of carvedilol and aldactone, which he takes in the evening. He checks his BP in the evenings, about an hour before his PM doses. Current Outpatient Medications on File Prior to Visit   Medication Sig Dispense Refill    isosorbide mononitrate ER (IMDUR) 30 mg tablet Take 1 Tablet by mouth daily. 90 Tablet 3    carvediloL (Coreg) 6.25 mg tablet Take 1 Tablet by mouth two (2) times daily (with meals). 180 Tablet 3    amLODIPine (NORVASC) 2.5 mg tablet Take 1 Tab by mouth daily. 90 Tab 3    losartan (COZAAR) 50 mg tablet Take 1 Tab by mouth daily. 90 Tab 3    multivitamin (ONE A DAY) tablet Take 1 Tab by mouth daily.  furosemide (LASIX) 40 mg tablet TAKE ONE TABLET BY MOUTH ONE TIME DAILY  30 Tab 3    spironolactone (ALDACTONE) 25 mg tablet TAKE ONE TABLET BY MOUTH TWICE DAILY  28 Tab 0    lovastatin (MEVACOR) 40 mg tablet TAKE 1 TABLET DAILY 90 Tab 3    aspirin (ASPIRIN) 325 mg tablet Take 325 mg by mouth daily.  potassium chloride (K-DUR, KLOR-CON) 20 mEq tablet TAKE ONE TABLET BY MOUTH TWICE DAILY  (Patient taking differently: Take 10 mEq by mouth. 2 tabs twice daily) 180 Tab 0    levothyroxine (SYNTHROID) 50 mcg tablet Take  by mouth Daily (before breakfast).  cholecalciferol, vitamin D3, (VITAMIN D3) 2,000 unit tab Take 2,000 Units by mouth daily. Indications: PREVENTION OF VITAMIN D DEFICIENCY       No current facility-administered medications on file prior to visit. Review of Symptoms:    Review of Systems   Constitutional: Negative for chills, fever and weight loss. HENT: Negative for nosebleeds. Eyes: Negative for blurred vision and double vision. Respiratory: Negative for cough, shortness of breath and wheezing. Cardiovascular: Negative for chest pain, palpitations, orthopnea, leg swelling and PND. Skin: Negative for rash. Neurological: Negative for dizziness and loss of consciousness. Physical Exam:      General: Well developed, in no acute distress, cooperative and alert  Heart:  reg rate and rhythm; normal S1/S2; no murmurs, no gallops or rubs. Respiratory: Clear bilaterally x 4, no wheezing or rales  Extremities:  Normal cap refill, no cyanosis, atraumatic. No edema. Vascular: 2+ pulses symmetric in all extremities    Vitals:    09/07/21 1020   BP: 111/74   BP 1 Location: Left arm   BP Patient Position: At rest   BP Cuff Size: Small adult   Pulse: (!) 49   Resp: 18   Height: 5' 8\" (1.727 m)   Weight: 204 lb 14.4 oz (92.9 kg)   SpO2: 97%       ECG done today shows sinus bradycardia     Assessment:       ICD-10-CM ICD-9-CM    1. Essential hypertension  I10 401.9    2. Paroxysmal atrial fibrillation (HCC)  I48.0 427.31 AMB POC EKG ROUTINE W/ 12 LEADS, INTER & REP   3. Hypertrophic cardiomyopathy (HCC)  I42.2 425.18    4. Mixed hyperlipidemia  E78.2 272.2         Plan:     1. Essential hypertension  BP controlled today  Continue amlodipine, Imdur, carvedilol, aldactone, losartan  Difficult to titrate medications due to CKD, bradycardia and peripheral edema  Will have him adjust doses during the days. Renal artery duplex 11/2020 without evidence of NEON    2. Paroxysmal atrial fibrillation (HCC)  S/p AF/ AFL ablation in 10/2017  Maintaining sinus rhythm  Continue BB dose; was symptomatic with higher doses of BB     3. Hypertrophic cardiomyopathy ([address]  Echo done in 9/2018 with preserved ejection fraction 55-60% with no evidence of hypertrophic myocardium  On BB, ARB, Lasix, and Spironolactone.     4. Mixed hyperlipidemia  LDL 91 in 8/2021  Continue statin therapy and low fat, low cholesterol diet  Lipids managed by PCP     5.  S/p implantable loop recorder  ILR has reached EOS    F/u with Dr. Paula Hunt in 3 months    Jean Claude Gandhi NP       [facility]             Patient seen, examined by me personally. Plan discussed as detailed. Agree with note as outlined by  NP with modifications as noted. My independent physical exam reveals : Physical Exam  Vitals and nursing note reviewed. Cardiovascular:      Rate and Rhythm: Normal rate and regular rhythm. Heart sounds: Normal heart sounds. Pulmonary:      Breath sounds: Normal breath sounds. Musculoskeletal:      Right lower leg: No edema. Left lower leg: No edema. Skin:     General: Skin is warm. Neurological:      Mental Status: He is alert and oriented to person, place, and time. Psychiatric:         Mood and Affect: Mood normal.          No additional findings noted. Agree with plan as outlined above with modifications as noted. Continue current therapy. On multiple medications at low doses due to intolerance.     Tia Lopez MD

## 2021-09-17 ENCOUNTER — TELEPHONE (OUTPATIENT)
Dept: CARDIOLOGY CLINIC | Age: 71
End: 2021-09-17

## 2021-09-17 NOTE — TELEPHONE ENCOUNTER
Pt informed his linq recorder is EOS. He can unplug the monitor and return it to us. He will keep his follow up appts. He will keep us posted on any change in symptoms. Pt verbalized understanding.

## 2021-09-24 ENCOUNTER — TELEPHONE (OUTPATIENT)
Dept: CARDIOLOGY CLINIC | Age: 71
End: 2021-09-24

## 2021-09-24 RX ORDER — ISOSORBIDE MONONITRATE 60 MG/1
60 TABLET, EXTENDED RELEASE ORAL DAILY
Qty: 90 TABLET | Refills: 3 | Status: SHIPPED | OUTPATIENT
Start: 2021-09-24

## 2021-09-24 NOTE — TELEPHONE ENCOUNTER
Pt needs refills on his isosoride  Mononitrate 30 mg   , as per is pharmacy he needs a new prescription because he is taking 2 instead of 1, it was increased. Please send it to [facility] # [phone]. Please call him back at [phone].     Thanks Amalia

## 2021-09-24 NOTE — TELEPHONE ENCOUNTER
[employer]     Can you refill pts medication. Pt needs refills on his isosoride  Mononitrate 30 mg   as per his pharmacy he needs a new prescription because he is taking 2 instead of 1, it was increased. Please send it to [facility].  Thank you

## 2021-09-24 NOTE — TELEPHONE ENCOUNTER
Spoke with patient. Verified with two patient identifiers. Advised pt that Dae De La O NP sent in refill to pharmacy. Patient verbalized understanding.

## 2021-10-29 LAB — HBA1C MFR BLD HPLC: 5.4 %

## 2021-11-02 RX ORDER — SPIRONOLACTONE 25 MG/1
TABLET ORAL
Qty: 180 TABLET | Refills: 3 | Status: SHIPPED | OUTPATIENT
Start: 2021-11-02

## 2021-12-10 ENCOUNTER — OFFICE VISIT (OUTPATIENT)
Dept: CARDIOLOGY CLINIC | Age: 71
End: 2021-12-10
Payer: MEDICARE

## 2021-12-10 VITALS
DIASTOLIC BLOOD PRESSURE: 70 MMHG | RESPIRATION RATE: 18 BRPM | BODY MASS INDEX: 31.33 KG/M2 | SYSTOLIC BLOOD PRESSURE: 130 MMHG | HEIGHT: 68 IN | OXYGEN SATURATION: 98 % | WEIGHT: 206.7 LBS | HEART RATE: 46 BPM

## 2021-12-10 DIAGNOSIS — R40.0 DAYTIME SOMNOLENCE: ICD-10-CM

## 2021-12-10 DIAGNOSIS — I10 ESSENTIAL HYPERTENSION: ICD-10-CM

## 2021-12-10 DIAGNOSIS — I48.0 PAROXYSMAL ATRIAL FIBRILLATION (HCC): Primary | ICD-10-CM

## 2021-12-10 DIAGNOSIS — E78.2 MIXED HYPERLIPIDEMIA: ICD-10-CM

## 2021-12-10 DIAGNOSIS — I42.2 HYPERTROPHIC CARDIOMYOPATHY (HCC): ICD-10-CM

## 2021-12-10 DIAGNOSIS — Z95.818 STATUS POST PLACEMENT OF IMPLANTABLE LOOP RECORDER: ICD-10-CM

## 2021-12-10 PROCEDURE — G8536 NO DOC ELDER MAL SCRN: HCPCS | Performed by: INTERNAL MEDICINE

## 2021-12-10 PROCEDURE — G8432 DEP SCR NOT DOC, RNG: HCPCS | Performed by: INTERNAL MEDICINE

## 2021-12-10 PROCEDURE — 99213 OFFICE O/P EST LOW 20 MIN: CPT | Performed by: INTERNAL MEDICINE

## 2021-12-10 PROCEDURE — 93005 ELECTROCARDIOGRAM TRACING: CPT | Performed by: INTERNAL MEDICINE

## 2021-12-10 PROCEDURE — G0463 HOSPITAL OUTPT CLINIC VISIT: HCPCS | Performed by: INTERNAL MEDICINE

## 2021-12-10 PROCEDURE — G8427 DOCREV CUR MEDS BY ELIG CLIN: HCPCS | Performed by: INTERNAL MEDICINE

## 2021-12-10 PROCEDURE — G8752 SYS BP LESS 140: HCPCS | Performed by: INTERNAL MEDICINE

## 2021-12-10 PROCEDURE — G8417 CALC BMI ABV UP PARAM F/U: HCPCS | Performed by: INTERNAL MEDICINE

## 2021-12-10 PROCEDURE — 3017F COLORECTAL CA SCREEN DOC REV: CPT | Performed by: INTERNAL MEDICINE

## 2021-12-10 PROCEDURE — 93010 ELECTROCARDIOGRAM REPORT: CPT | Performed by: INTERNAL MEDICINE

## 2021-12-10 PROCEDURE — G8754 DIAS BP LESS 90: HCPCS | Performed by: INTERNAL MEDICINE

## 2021-12-10 PROCEDURE — 1101F PT FALLS ASSESS-DOCD LE1/YR: CPT | Performed by: INTERNAL MEDICINE

## 2021-12-10 NOTE — PROGRESS NOTES
Chief Complaint   Patient presents with    Cholesterol Problem     3 months follow up. Pulse low- high blood pressure.  Hypertension    Irregular Heart Beat     1. Have you been to the ER, urgent care clinic since your last visit? Hospitalized since your last visit? No  2. Have you seen or consulted any other health care providers outside of the [address] [place] [address] since your last visit? Include any pap smears or colon screening.  Yes pcp

## 2021-12-10 NOTE — PROGRESS NOTES
Ellie Solano, [facility]    Subjective/HPI:     Trung Fabian is a 70 y.o. male is here for routine f/u. He has a PMHx of PAF s/p ablation with ILR, HTN, HLD and hx of hypertrophic cardiomyopathy. Feels well. BP remains labile at home. Most days, readings are normal, but some days BP will be high as 170s/100s. No discernable pattern to this. Tolerating all meds well otherwise. Reports daytime fatigue, often takes naps mid-day. Sleeps about 5-6 hours overnight, but wakes up frequently to use the bathroom. Never had a sleep study done in the past.    Current Outpatient Medications on File Prior to Visit   Medication Sig Dispense Refill    spironolactone (ALDACTONE) 25 mg tablet TAKE 1 TABLET TWICE A  Tablet 3    isosorbide mononitrate ER (IMDUR) 60 mg CR tablet Take 1 Tablet by mouth daily. 90 Tablet 3    carvediloL (Coreg) 6.25 mg tablet Take 1 Tablet by mouth two (2) times daily (with meals). 180 Tablet 3    amLODIPine (NORVASC) 2.5 mg tablet Take 1 Tab by mouth daily. 90 Tab 3    losartan (COZAAR) 50 mg tablet Take 1 Tab by mouth daily. 90 Tab 3    multivitamin (ONE A DAY) tablet Take 1 Tab by mouth daily.  furosemide (LASIX) 40 mg tablet TAKE ONE TABLET BY MOUTH ONE TIME DAILY  30 Tab 3    lovastatin (MEVACOR) 40 mg tablet TAKE 1 TABLET DAILY 90 Tab 3    potassium chloride (K-DUR, KLOR-CON) 20 mEq tablet TAKE ONE TABLET BY MOUTH TWICE DAILY  (Patient taking differently: Take 10 mEq by mouth. 2 tabs twice daily) 180 Tab 0    levothyroxine (SYNTHROID) 50 mcg tablet Take  by mouth Daily (before breakfast).  aspirin (ASPIRIN) 325 mg tablet Take 325 mg by mouth daily.  [DISCONTINUED] cholecalciferol, vitamin D3, (VITAMIN D3) 2,000 unit tab Take 2,000 Units by mouth daily. Indications: PREVENTION OF VITAMIN D DEFICIENCY (Patient not taking: Reported on 12/10/2021)       No current facility-administered medications on file prior to visit.        Review of Symptoms:    Review of Systems   Constitutional: Negative for chills, fever and weight loss. HENT: Negative for nosebleeds. Eyes: Negative for blurred vision and double vision. Respiratory: Negative for cough, shortness of breath and wheezing. Cardiovascular: Negative for chest pain, palpitations, orthopnea, leg swelling and PND. Skin: Negative for rash. Neurological: Negative for dizziness and loss of consciousness. Physical Exam:      General: Well developed, in no acute distress, cooperative and alert  Heart:  reg rate and rhythm; normal S1/S2; no murmurs, no gallops or rubs. Respiratory: Clear bilaterally x 4, no wheezing or rales  Extremities:  Normal cap refill, no cyanosis, atraumatic. No edema. Vascular: 2+ pulses symmetric in all extremities    Vitals:    12/10/21 1121   BP: 130/70   BP 1 Location: Left upper arm   BP Patient Position: Sitting   BP Cuff Size: Large adult long   Pulse: (!) 46   Resp: 18   Height: 5' 8\" (1.727 m)   Weight: 206 lb 11.2 oz (93.8 kg)   SpO2: 98%       ECG done today shows sinus bradycardia     Assessment:       ICD-10-CM ICD-9-CM    1. Paroxysmal atrial fibrillation (HCC)  I48.0 427.31    2. Hypertrophic cardiomyopathy (HCC)  I42.2 425.18    3. Essential hypertension  I10 401.9 AMB POC EKG ROUTINE W/ 12 LEADS, INTER & REP   4. Mixed hyperlipidemia  E78.2 272.2    5. Status post placement of implantable loop recorder  Z95.818 V45.09         Plan:     1. Paroxysmal atrial fibrillation (HCC)  S/p AF/ AFL ablation in 10/2017  Maintaining sinus rhythm  Continue BB dose; was symptomatic with higher doses of BB     2. Hypertrophic cardiomyopathy ([facility] [address] [facility]  Echo done in 9/2018 with preserved ejection fraction 55-60% with no evidence of hypertrophic myocardium  On BB, ARB, Lasix, and Spironolactone.     3. Essential hypertension  BP controlled today  Continue amlodipine, Imdur, carvedilol, aldactone, losartan  Difficult to titrate medications due to CKD, bradycardia and peripheral edema  Will have him adjust doses during the days. Renal artery duplex 11/2020 without evidence of NENO     4. Mixed hyperlipidemia  LDL 91 in 8/2021  Continue statin therapy and low fat, low cholesterol diet  Lipids managed by PCP     5.  S/p implantable loop recorder  ILR has reached EOS    6. Daytime somnolence  Will refer to sleep medicine    F/u with Dr. Josh Gudino in 6 months    Jamia Browning NP       [facility]             Patient seen, examined by me personally. Plan discussed as detailed. Agree with note as outlined by  NP with modifications as noted. My independent physical exam reveals : Physical Exam  Vitals and nursing note reviewed. Cardiovascular:      Rate and Rhythm: Normal rate and regular rhythm. Heart sounds: Normal heart sounds. Pulmonary:      Breath sounds: Normal breath sounds. Musculoskeletal:      Right lower leg: No edema. Left lower leg: No edema. Skin:     General: Skin is warm. Neurological:      Mental Status: He is alert and oriented to person, place, and time. Psychiatric:         Mood and Affect: Mood normal.          No additional findings noted. Agree with plan as outlined above with modifications as noted.      Hernan Mccann MD

## 2021-12-15 RX ORDER — FUROSEMIDE 40 MG/1
TABLET ORAL
Qty: 90 TABLET | Refills: 3 | Status: SHIPPED | OUTPATIENT
Start: 2021-12-15

## 2021-12-15 NOTE — TELEPHONE ENCOUNTER
Pt has questions about his medication dosages. His isosorbide mononitrate used to be 30 mg but now its 60 mg. He is confused as he states he was not told about these changes, and he wants to make sure this is not a mistake.      Callback is [phone]    Thanks  Fox Alonzo

## 2021-12-15 NOTE — TELEPHONE ENCOUNTER
This writer contacted patient, two patient identifiers verified. Patient was informed Imdur (Isosorbide mononitrate) was increase to 60mg on 08/11/2021 due to elevated BP. Patient states he was reviewing paperwork from September dosage stated 30mg and just wanted to ensure he was taking the correct dosage. Last office note review, dosage of 60mg verified and clarified with patient. Patient verbalized understanding. Patient also requesting a new RX for furosemide 40mg tab. RX Pending. Pharmacy verified.

## 2022-03-07 RX ORDER — LOSARTAN POTASSIUM 50 MG/1
TABLET ORAL
Qty: 180 TABLET | Refills: 3 | Status: SHIPPED | OUTPATIENT
Start: 2022-03-07

## 2022-03-18 PROBLEM — I87.2 VENOUS INSUFFICIENCY OF BOTH LOWER EXTREMITIES: Status: ACTIVE | Noted: 2021-03-09

## 2022-03-20 PROBLEM — Z95.818 STATUS POST PLACEMENT OF IMPLANTABLE LOOP RECORDER: Status: ACTIVE | Noted: 2020-07-14

## 2022-03-20 PROBLEM — M79.89 LEG SWELLING: Status: ACTIVE | Noted: 2021-03-09

## 2022-03-29 NOTE — PROGRESS NOTES
Subjective:   Ron Perez DNP, ANP-BC     Electrophysiology  Kobe Munoz is a 67 y.o. male is here for yearly EP consult. History of paroxysmal atrial fibrillation/flutter (AF/YVONNE ablation 2017). History of ILR has gone EOL last year. EKGs with persistent asymptomatic sinus bradycardia. Patient reports feeling well denies any dizziness lightheadedness syncope near syncope or tunnel vision episodes.     Patient Active Problem List    Diagnosis Date Noted    Leg swelling 03/09/2021    Venous insufficiency of both lower extremities 03/09/2021    Status post placement of implantable loop recorder 07/14/2020    Hypertrophic cardiomyopathy ([address] 12/03/2014    Atrial fibrillation ([address] 11/12/2014    HTN (hypertension) 11/12/2014    Mixed hyperlipidemia 11/12/2014      Choco Dunbar MD  Past Medical History:   Diagnosis Date    Arrhythmia     a fib    Atrial fibrillation ([address]     Cancer ([address]     skin, throat cancer    Congestive heart failure ([address] 11/2020    hosp at [address] tube feedings ([address]     PEG    High cholesterol     Hypertension     Hypertrophic cardiomyopathy ([address]     per cardiology notes 6/2013    Long term current use of anticoagulant therapy     Status post chemoradiation     completed 12/2013    Throat cancer [facility])       Past Surgical History:   Procedure Laterality Date    COLONOSCOPY N/A 1/17/2017    COLONOSCOPY performed by Gabriella Rivera MD at [facility] ENDOSCOPY    HX HEART CATHETERIZATION  2007    HX ORTHOPAEDIC      Rt. hand surgery    HX OTHER SURGICAL      exc. skin cancer    HX OTHER SURGICAL      peg tube inserted    HX TONSILLECTOMY      CA EGD BALLOON DILATION ESOPHAGUS <30 MM DIAM  11/8/2013          No Known Allergies   Family History   Problem Relation Age of Onset    Heart Attack Mother     Cancer Father     negative for cardiac disease  Social History     Socioeconomic History    Marital status:    Tobacco Use    Smoking status: Never Smoker    Smokeless tobacco: Former User     Types: Chew   Vaping Use    Vaping Use: Never used   Substance and Sexual Activity    Alcohol use: Yes     Alcohol/week: 1.0 standard drink     Types: 1 Shots of liquor per week     Comment: once a week- a glass or two of moonshine     Drug use: No    Sexual activity: Not Currently     Current Outpatient Medications   Medication Sig    losartan (COZAAR) 50 mg tablet TAKE 1 TABLET TWICE A DAY (Patient taking differently: Take 50 mg by mouth daily.)    furosemide (LASIX) 40 mg tablet TAKE 1 TABLET DAILY    spironolactone (ALDACTONE) 25 mg tablet TAKE 1 TABLET TWICE A DAY    isosorbide mononitrate ER (IMDUR) 60 mg CR tablet Take 1 Tablet by mouth daily.  carvediloL (Coreg) 6.25 mg tablet Take 1 Tablet by mouth two (2) times daily (with meals).  amLODIPine (NORVASC) 2.5 mg tablet Take 1 Tab by mouth daily.  multivitamin (ONE A DAY) tablet Take 1 Tab by mouth daily.  lovastatin (MEVACOR) 40 mg tablet TAKE 1 TABLET DAILY    aspirin (ASPIRIN) 325 mg tablet Take 325 mg by mouth. 1/2 tab daily    potassium chloride (K-DUR, KLOR-CON) 20 mEq tablet TAKE ONE TABLET BY MOUTH TWICE DAILY  (Patient taking differently: Take 10 mEq by mouth. 2 tabs twice daily)    levothyroxine (SYNTHROID) 50 mcg tablet Take  by mouth Daily (before breakfast). No current facility-administered medications for this visit. Vitals:    03/30/22 1006   BP: 108/78   Pulse: 60   Resp: 16   SpO2: 96%   Weight: 206 lb (93.4 kg)   Height: 5' 8\" (1.727 m)       I have reviewed the nurses notes, vitals, problem list, allergy list, medical history, family, social history and medications. Review of Symptoms:    General: Pt denies excessive weight gain or loss. Pt is able to conduct ADL's  HEENT: Denies blurred vision, headaches, hearing loss, epistaxis and difficulty swallowing.   Respiratory: Denies cough, congestion, shortness of breath, DUMONT, wheezing or stridor. Cardiovascular: Denies precordial pain, palpitations, edema or PND  Gastrointestinal: Denies poor appetite, indigestion, abdominal pain or blood in stool  Genitourinary: Denies hematuria, dysuria, increased urinary frequency  Musculoskeletal: Denies joint pain or swelling from muscles or joints  Neurologic: Denies tremor, paresthesias, headache, or sensory motor disturbance  Psychiatric: Denies confusion, insomnia, depression  Integumentray: Denies rash, itching or ulcers. Hematologic: Denies easy bruising, bleeding    Physical Exam:      General: Well developed, in no acute distress. HEENT: Eyes - PERRL, no jvd  Heart:  Normal S1/S2 negative S3 or S4. Regular, no murmur, gallop or rub. Respiratory: Clear bilaterally x 4, no wheezing or rales  Abdomen:   Soft, non-tender, bowel sounds are active. Extremities:  No edema, normal cap refill, no cyanosis. Musculoskeletal: No clubbing  Neuro: A&Ox3, speech clear, gait stable. Skin: Skin color is normal. No rashes or lesions. Non diaphoretic, no ulcers or subcutaneous nodule  Vascular: 2+ pulses symmetric in all extremities  Psych - judgement intact and orientation is wnl     Cardiographics    Ekg: Sinus bradycardia    Results for orders placed or performed in visit on 09/04/18   CARDIAC HOLTER MONITOR, 24 HOURS    Narrative    ECG Monitor/24 hours, Complete    Reason for Holter Monitor  BRADYCARDIA    Heartbeat    Slowest 37  Average 58  Fastest  118      Results:   Underlying Rhythm: Sinus bradycardia      Atrial Arrhythmias: premature atrial contractions; occasional, atrial couplets, paroxysmal supraventricular tachycardia and severe bradycardia            AV Conduction: normal    Ventricular Arrhythmias: premature ventricular contractions; occasional and ventricular couplets     ST Segment Analysis:normal     Symptom Correlation:  none    Comment:   Sinus bradycardia with profound bradycardia to 36 bpm with short burst of PAT and ventricular ectopy. Clinical correlation advised     Emma Payne MD, Corinne Mae      Results for orders placed or performed during the hospital encounter of 10/25/17   EKG, 12 LEAD, INITIAL   Result Value Ref Range    Ventricular Rate 69 BPM    Atrial Rate 69 BPM    P-R Interval 170 ms    QRS Duration 88 ms    Q-T Interval 436 ms    QTC Calculation (Bezet) 467 ms    Calculated P Axis 75 degrees    Calculated R Axis 53 degrees    Calculated T Axis -148 degrees    Diagnosis       Normal sinus rhythm  Left ventricular hypertrophy with repolarization abnormality  When compared with ECG of 05-OCT-2017 10:23,  ST more depressed in Inferior leads  ST now depressed in Anterior leads  Confirmed by Adela Culp ([id number]) on 10/26/2017 3:07:29 PM           Lab Results   Component Value Date/Time    WBC 4.6 06/14/2018 01:05 PM    HGB 14.6 06/14/2018 01:05 PM    HCT 41.5 06/14/2018 01:05 PM    PLATELET 333 55/79/0012 01:05 PM    MCV 92.6 06/14/2018 01:05 PM      Lab Results   Component Value Date/Time    Sodium 143 08/30/2021 09:29 AM    Potassium 4.9 08/30/2021 09:29 AM    Chloride 105 08/30/2021 09:29 AM    CO2 27 08/30/2021 09:29 AM    Anion gap 6 06/14/2018 01:05 PM    Glucose 119 (H) 08/30/2021 09:29 AM    BUN 24 08/30/2021 09:29 AM    Creatinine 1.50 (H) 08/30/2021 09:29 AM    BUN/Creatinine ratio 16 08/30/2021 09:29 AM    GFR est AA 53 (L) 08/30/2021 09:29 AM    GFR est non-AA 46 (L) 08/30/2021 09:29 AM    Calcium 9.2 08/30/2021 09:29 AM    Bilirubin, total 0.5 08/30/2021 09:29 AM    Alk. phosphatase 52 08/30/2021 09:29 AM    Protein, total 6.4 08/30/2021 09:29 AM    Albumin 4.2 08/30/2021 09:29 AM    Globulin 3.1 06/14/2018 01:05 PM    A-G Ratio 1.9 08/30/2021 09:29 AM    ALT (SGPT) 13 08/30/2021 09:29 AM         Assessment:     Assessment:        ICD-10-CM ICD-9-CM    1.  Paroxysmal atrial fibrillation (HCC)  I48.0 427.31 AMB POC EKG ROUTINE W/ 12 LEADS, INTER & REP   2. Primary hypertension  I10 401.9 AMB POC EKG ROUTINE W/ 12 LEADS, INTER & REP   3. Hypertrophic cardiomyopathy (HCC)  I42.2 425.18 AMB POC EKG ROUTINE W/ 12 LEADS, INTER & REP   4. Mixed hyperlipidemia  E78.2 272.2 AMB POC EKG ROUTINE W/ 12 LEADS, INTER & REP     Orders Placed This Encounter    AMB POC EKG ROUTINE W/ 12 LEADS, INTER & REP     Order Specific Question:   Reason for Exam:     Answer:   routine        Plan:   Charan Arias is a 70-year-old male with a history of hypertrophic cardiomyopathy normal EF 9/2018, paroxysmal atrial fibrillation/flutter  (AF/AFl ablation 2017), hypertension, hyperlipidemia. EKG normal sinus rhythm/sinus bradycardia heart rate 56/58 bpm.  Asymptomatic of bradycardia. Continue medical management for hypertrophic cardiomyopathy, paroxysmal atrial fibrillation/flutter history of ablation, hypertension. Patient prefers to remain on half a tablet of aspirin 162 mg, counseled patient on increased risk of gastric irritation and recommend changing to 81 mg enteric-coated. Thank you for allowing me to participate in Charan Arias 's care. Follow-up with electrophysiology in 1 year.   Kristopher Bonner DNP, ANP-BC

## 2022-03-30 ENCOUNTER — OFFICE VISIT (OUTPATIENT)
Dept: CARDIOLOGY CLINIC | Age: 72
End: 2022-03-30
Payer: MEDICARE

## 2022-03-30 VITALS
HEIGHT: 68 IN | BODY MASS INDEX: 31.22 KG/M2 | SYSTOLIC BLOOD PRESSURE: 108 MMHG | WEIGHT: 206 LBS | RESPIRATION RATE: 16 BRPM | DIASTOLIC BLOOD PRESSURE: 78 MMHG | HEART RATE: 60 BPM | OXYGEN SATURATION: 96 %

## 2022-03-30 DIAGNOSIS — E78.2 MIXED HYPERLIPIDEMIA: ICD-10-CM

## 2022-03-30 DIAGNOSIS — I48.0 PAROXYSMAL ATRIAL FIBRILLATION (HCC): Primary | ICD-10-CM

## 2022-03-30 DIAGNOSIS — I10 PRIMARY HYPERTENSION: ICD-10-CM

## 2022-03-30 DIAGNOSIS — I42.2 HYPERTROPHIC CARDIOMYOPATHY (HCC): ICD-10-CM

## 2022-03-30 PROCEDURE — 93010 ELECTROCARDIOGRAM REPORT: CPT | Performed by: NURSE PRACTITIONER

## 2022-03-30 PROCEDURE — G8417 CALC BMI ABV UP PARAM F/U: HCPCS | Performed by: NURSE PRACTITIONER

## 2022-03-30 PROCEDURE — 93005 ELECTROCARDIOGRAM TRACING: CPT | Performed by: NURSE PRACTITIONER

## 2022-03-30 PROCEDURE — 99214 OFFICE O/P EST MOD 30 MIN: CPT | Performed by: NURSE PRACTITIONER

## 2022-03-30 PROCEDURE — 3017F COLORECTAL CA SCREEN DOC REV: CPT | Performed by: NURSE PRACTITIONER

## 2022-03-30 PROCEDURE — 1101F PT FALLS ASSESS-DOCD LE1/YR: CPT | Performed by: NURSE PRACTITIONER

## 2022-03-30 PROCEDURE — G8427 DOCREV CUR MEDS BY ELIG CLIN: HCPCS | Performed by: NURSE PRACTITIONER

## 2022-03-30 PROCEDURE — G8752 SYS BP LESS 140: HCPCS | Performed by: NURSE PRACTITIONER

## 2022-03-30 PROCEDURE — G8536 NO DOC ELDER MAL SCRN: HCPCS | Performed by: NURSE PRACTITIONER

## 2022-03-30 PROCEDURE — G8754 DIAS BP LESS 90: HCPCS | Performed by: NURSE PRACTITIONER

## 2022-03-30 PROCEDURE — G8432 DEP SCR NOT DOC, RNG: HCPCS | Performed by: NURSE PRACTITIONER

## 2022-03-30 PROCEDURE — G0463 HOSPITAL OUTPT CLINIC VISIT: HCPCS | Performed by: NURSE PRACTITIONER

## 2022-03-30 NOTE — LETTER
3/30/2022    Patient: Jam Edmnods   YOB: 1950   Date of Visit: 3/30/2022     Mylene Tucker, [address]   [address] [facility] [address] [address]  Via Fax: [phone]    Dear Mylene Tucker MD,      Thank you for referring Mr. Waldemar Helton to [address] [place], [place] [address]anali for evaluation. My notes for this consultation are attached. If you have questions, please do not hesitate to call me. I look forward to following your patient along with you.       Sincerely,    Mik Frye NP

## 2022-03-30 NOTE — PROGRESS NOTES
1. Have you been to the ER, urgent care clinic since your last visit? Hospitalized since your last visit? No.    2. Have you seen or consulted any other health care providers outside of the [address] [place] [address] since your last visit? Include any pap smears or colon screening.    No.        Chief Complaint   Patient presents with    Irregular Heart Beat    Annual Exam     pt denies any cardiac symptoms

## 2022-04-04 RX ORDER — LOVASTATIN 40 MG/1
TABLET ORAL
Qty: 90 TABLET | Refills: 3 | Status: SHIPPED | OUTPATIENT
Start: 2022-04-04

## 2022-04-04 RX ORDER — AMLODIPINE BESYLATE 2.5 MG/1
TABLET ORAL
Qty: 90 TABLET | Refills: 3 | Status: SHIPPED | OUTPATIENT
Start: 2022-04-04

## 2022-06-24 ENCOUNTER — HOSPITAL ENCOUNTER (EMERGENCY)
Age: 72
Discharge: LEFT BEFORE DISCHARGE | End: 2022-06-24

## 2022-06-24 VITALS
RESPIRATION RATE: 18 BRPM | TEMPERATURE: 98.3 F | OXYGEN SATURATION: 98 % | HEIGHT: 68 IN | BODY MASS INDEX: 30.31 KG/M2 | HEART RATE: 51 BPM | WEIGHT: 200 LBS | SYSTOLIC BLOOD PRESSURE: 145 MMHG | DIASTOLIC BLOOD PRESSURE: 86 MMHG

## 2022-06-25 ENCOUNTER — TRANSCRIBE ORDER (OUTPATIENT)
Dept: SCHEDULING | Age: 72
End: 2022-06-25

## 2022-06-25 DIAGNOSIS — Z87.898 HISTORY OF SOLITARY PULMONARY NODULE: ICD-10-CM

## 2022-06-25 DIAGNOSIS — R04.2 HEMOPTYSIS: Primary | ICD-10-CM

## 2022-06-27 ENCOUNTER — HOSPITAL ENCOUNTER (OUTPATIENT)
Dept: CT IMAGING | Age: 72
Discharge: HOME | End: 2022-06-27
Attending: SPECIALIST
Payer: MEDICARE

## 2022-06-27 DIAGNOSIS — Z87.898 HISTORY OF SOLITARY PULMONARY NODULE: ICD-10-CM

## 2022-06-27 DIAGNOSIS — R04.2 HEMOPTYSIS: ICD-10-CM

## 2022-06-27 PROCEDURE — 71250 CT THORAX DX C-: CPT

## 2023-05-10 ENCOUNTER — TELEPHONE (OUTPATIENT)
Age: 73
End: 2023-05-10

## 2023-05-11 ENCOUNTER — HOSPITAL ENCOUNTER (OUTPATIENT)
Facility: HOSPITAL | Age: 73
Discharge: HOME | End: 2023-05-13
Payer: MEDICARE

## 2023-05-11 VITALS
DIASTOLIC BLOOD PRESSURE: 71 MMHG | BODY MASS INDEX: 30.41 KG/M2 | OXYGEN SATURATION: 91 % | SYSTOLIC BLOOD PRESSURE: 96 MMHG | HEART RATE: 61 BPM | RESPIRATION RATE: 17 BRPM | WEIGHT: 200 LBS

## 2023-05-11 DIAGNOSIS — I48.91 ATRIAL FIBRILLATION (HCC): ICD-10-CM

## 2023-05-11 PROCEDURE — 2580000003 HC RX 258: Performed by: STUDENT IN AN ORGANIZED HEALTH CARE EDUCATION/TRAINING PROGRAM

## 2023-05-11 PROCEDURE — 99153 MOD SED SAME PHYS/QHP EA: CPT

## 2023-05-11 PROCEDURE — 6370000000 HC RX 637 (ALT 250 FOR IP): Performed by: STUDENT IN AN ORGANIZED HEALTH CARE EDUCATION/TRAINING PROGRAM

## 2023-05-11 PROCEDURE — 93312 ECHO TRANSESOPHAGEAL: CPT

## 2023-05-11 PROCEDURE — 99152 MOD SED SAME PHYS/QHP 5/>YRS: CPT

## 2023-05-11 PROCEDURE — 6360000002 HC RX W HCPCS: Performed by: STUDENT IN AN ORGANIZED HEALTH CARE EDUCATION/TRAINING PROGRAM

## 2023-05-11 RX ORDER — MIDAZOLAM HYDROCHLORIDE 1 MG/ML
INJECTION INTRAMUSCULAR; INTRAVENOUS PRN
Status: COMPLETED | OUTPATIENT
Start: 2023-05-11 | End: 2023-05-11

## 2023-05-11 RX ORDER — DILTIAZEM HYDROCHLORIDE 240 MG/1
240 CAPSULE, EXTENDED RELEASE ORAL DAILY
COMMUNITY

## 2023-05-11 RX ORDER — FENTANYL CITRATE 50 UG/ML
INJECTION, SOLUTION INTRAMUSCULAR; INTRAVENOUS PRN
Status: COMPLETED | OUTPATIENT
Start: 2023-05-11 | End: 2023-05-11

## 2023-05-11 RX ORDER — 0.9 % SODIUM CHLORIDE 0.9 %
500 INTRAVENOUS SOLUTION INTRAVENOUS ONCE
Status: COMPLETED | OUTPATIENT
Start: 2023-05-11 | End: 2023-05-11

## 2023-05-11 RX ORDER — LIDOCAINE HYDROCHLORIDE 20 MG/ML
SOLUTION OROPHARYNGEAL PRN
Status: COMPLETED | OUTPATIENT
Start: 2023-05-11 | End: 2023-05-11

## 2023-05-11 RX ADMIN — MIDAZOLAM HYDROCHLORIDE 2 MG: 1 INJECTION, SOLUTION INTRAMUSCULAR; INTRAVENOUS at 12:19

## 2023-05-11 RX ADMIN — MIDAZOLAM HYDROCHLORIDE 2 MG: 1 INJECTION, SOLUTION INTRAMUSCULAR; INTRAVENOUS at 11:48

## 2023-05-11 RX ADMIN — MIDAZOLAM HYDROCHLORIDE 1 MG: 1 INJECTION, SOLUTION INTRAMUSCULAR; INTRAVENOUS at 11:49

## 2023-05-11 RX ADMIN — LIDOCAINE HYDROCHLORIDE 15 ML: 20 SOLUTION ORAL at 11:27

## 2023-05-11 RX ADMIN — MIDAZOLAM HYDROCHLORIDE 1 MG: 1 INJECTION, SOLUTION INTRAMUSCULAR; INTRAVENOUS at 12:22

## 2023-05-11 RX ADMIN — BENZOCAINE, BUTAMBEN, AND TETRACAINE HYDROCHLORIDE 3 SPRAY: .028; .004; .004 AEROSOL, SPRAY TOPICAL at 11:27

## 2023-05-11 RX ADMIN — FENTANYL CITRATE 50 MCG: 50 INJECTION, SOLUTION INTRAMUSCULAR; INTRAVENOUS at 11:47

## 2023-05-11 RX ADMIN — SODIUM CHLORIDE 500 ML: 9 INJECTION, SOLUTION INTRAVENOUS at 12:45

## 2023-05-11 RX ADMIN — FENTANYL CITRATE 25 MCG: 50 INJECTION, SOLUTION INTRAMUSCULAR; INTRAVENOUS at 11:54

## 2023-05-11 RX ADMIN — FENTANYL CITRATE 25 MCG: 50 INJECTION, SOLUTION INTRAMUSCULAR; INTRAVENOUS at 12:19

## 2023-05-11 RX ADMIN — MIDAZOLAM HYDROCHLORIDE 1 MG: 1 INJECTION, SOLUTION INTRAMUSCULAR; INTRAVENOUS at 11:53

## 2023-05-11 NOTE — PROGRESS NOTES
Pt sedated with 7mg Versed and 100mcg Fentanyl (monitored sedation from 1147 to 1240)  -Unable to perform JOHN as probe could not be passed. Will be rescheduled with anesthesia assistance.

## 2023-05-11 NOTE — DISCHARGE INSTRUCTIONS
DISCHARGE SUMMARY     The following personal items collected during your admission are returned to you:   Dental Appliance:    Vision:    Hearing Aid:    Jewelry:    Clothing:      PATIENT INSTRUCTIONS: Continue taking all the same medications. You need to see a Gastroenterology doctor to determine if you have esophageal narrowing from your cancer. This is likely why the cardiologist here could not pass the JOHN probe into your esophagus. If you have a narrowing, it will need to be addressed before another JOHN can be attempted. Regarding your xarelto and the cost, you need to make a follow-up appointment with Dr Marcy Cain or Kevin 49 you run out of Xarelto so that you can discuss what other option(s) there might be for you such as Coumadin. You will also need to ask the cardiology office to schedule you for another attempt at today's procedure. Dr David Gomez says you can either reschedule for a JOHN/DCC with anesthesia or just a [facility] when you've been on Xarelto for [address]. If you need a consult for a GI doctor, ask your cardiologist if they can provide this. If they cannot, you may need to obtain a referral from your primary care doctor. What to do at Home:    At 245pm you can start trying to [address]. No driving today    The discharge information has been reviewed with the PATIENT . The PATIENT  verbalized understanding.

## 2023-05-11 NOTE — PROGRESS NOTES
Patient arrived to Non-Invasive Cardiology Lab for Out Patient JOHN/DCC Procedure. Staff introduced to patient. Patient identifiers verified with Name and Date of Birth. Procedure verified with patient. Consent forms reviewed and signed by patient or authorized representative and verified. Allergies verified. Patient informed of procedure and plan of care. Questions answered with review. Patient on cardiac monitor, non-invasive blood pressure, SPO2 monitor. On RA. Patient is A&Ox3. Patient reports no complaints. Patient on stretcher, in low position, with side rails up. Patient instructed to call for assistance as needed. Family in waiting room.

## 2023-05-16 ENCOUNTER — TELEPHONE (OUTPATIENT)
Age: 73
End: 2023-05-16

## 2023-05-16 NOTE — TELEPHONE ENCOUNTER
I called patient contact Susan Page. She states the patient has bad telephone reception in the area he lives. She will text him and ask him to call me back. Patient needs office visit with Dr. Glen Stevens.   Referral from Dr. Mariann De León.

## 2023-05-24 ENCOUNTER — TELEPHONE (OUTPATIENT)
Age: 73
End: 2023-05-24

## 2023-05-24 ENCOUNTER — INITIAL CONSULT (OUTPATIENT)
Age: 73
End: 2023-05-24
Payer: MEDICARE

## 2023-05-24 VITALS
DIASTOLIC BLOOD PRESSURE: 74 MMHG | HEART RATE: 110 BPM | RESPIRATION RATE: 14 BRPM | OXYGEN SATURATION: 94 % | SYSTOLIC BLOOD PRESSURE: 146 MMHG

## 2023-05-24 DIAGNOSIS — I48.19 PERSISTENT ATRIAL FIBRILLATION (HCC): Primary | ICD-10-CM

## 2023-05-24 PROCEDURE — 4004F PT TOBACCO SCREEN RCVD TLK: CPT | Performed by: NURSE PRACTITIONER

## 2023-05-24 PROCEDURE — G8417 CALC BMI ABV UP PARAM F/U: HCPCS | Performed by: NURSE PRACTITIONER

## 2023-05-24 PROCEDURE — 3078F DIAST BP <80 MM HG: CPT | Performed by: THORACIC SURGERY (CARDIOTHORACIC VASCULAR SURGERY)

## 2023-05-24 PROCEDURE — 1123F ACP DISCUSS/DSCN MKR DOCD: CPT | Performed by: NURSE PRACTITIONER

## 2023-05-24 PROCEDURE — 3017F COLORECTAL CA SCREEN DOC REV: CPT | Performed by: THORACIC SURGERY (CARDIOTHORACIC VASCULAR SURGERY)

## 2023-05-24 PROCEDURE — 99204 OFFICE O/P NEW MOD 45 MIN: CPT | Performed by: NURSE PRACTITIONER

## 2023-05-24 PROCEDURE — 3077F SYST BP >= 140 MM HG: CPT | Performed by: NURSE PRACTITIONER

## 2023-05-24 PROCEDURE — G8427 DOCREV CUR MEDS BY ELIG CLIN: HCPCS | Performed by: NURSE PRACTITIONER

## 2023-05-24 NOTE — PROGRESS NOTES
CSS   History and Physical    Subjective:      John Patel is a 68 y.o. male who was referred for cardiac evaluation for persistent atrial fibrillation by Dr. Yola Law. Pt has a significant PMH of afib, HTN, HLD, cardiomyopathy, carotid atherosclerosis, throat cancer s/p radiation, esophageal stricture s/p esophageal dilation 2013, hypothyroid. Previous ablations 2017. Pt denies SINDHU. Taking xarelto for [facility] and denies h/o GIB. Denies previous surgeries/trauma to chest. MR is noted as trace. Denies being on testosterone or other hormonal replacement therapy. Pt consumes occasional tea caffeinated beverages. He complains of palpitations. He denies SOB, CP, fatigue, feeling lightheaded. Cardioversion 5/11/23 procedure aborted due to inability to pass the JOHN probe past upper esophagus despite direct visualization with Glidescope (with the help of anesthesiologist). Mr. Claudia Marshall reports a history of throat cancer and difficulty swallowing with regurgitation of swallowed food materials. He has received radiation to throat for cancer. He had a barium swallow yesterday and is following with GI. He is awaiting results of barium swallow to schedule the next steps. Pt lives alone. 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 He is a nonsmoker, drinks none/day, and denies illicit drug use. Pt has a significant family history of afib. He's had the J&J COVID-19 vaccine.     Cardiac Testing    Cardiac catheterization: none    ECHO:      Past Medical History:   Diagnosis Date    Arrhythmia     a fib    Atrial fibrillation ([address]     Cancer ([address]     skin, throat cancer    Congestive heart failure ([address] 11/2020    hosp at [facility]    G tube feedings ([address]     PEG    High cholesterol     Hypertension     Hypertrophic cardiomyopathy ([address]     per cardiology notes 6/2013    Long term current use of anticoagulant therapy     Status post chemoradiation     completed

## 2023-05-30 ENCOUNTER — TELEPHONE (OUTPATIENT)
Age: 73
End: 2023-05-30

## 2023-05-31 ENCOUNTER — TELEPHONE (OUTPATIENT)
Age: 73
End: 2023-05-31

## 2023-05-31 NOTE — TELEPHONE ENCOUNTER
Patient called to let us know that he heard from [facility] [place]. He states that she will contact our office to coordinate timing of procedure. I informed patient I would let Kindra Ly know.

## 2023-06-05 ENCOUNTER — OFFICE VISIT (OUTPATIENT)
Age: 73
End: 2023-06-05
Payer: MEDICARE

## 2023-06-05 VITALS
HEART RATE: 104 BPM | DIASTOLIC BLOOD PRESSURE: 87 MMHG | BODY MASS INDEX: 31.34 KG/M2 | HEIGHT: 68 IN | TEMPERATURE: 97.5 F | SYSTOLIC BLOOD PRESSURE: 128 MMHG | WEIGHT: 206.8 LBS | RESPIRATION RATE: 18 BRPM | OXYGEN SATURATION: 95 %

## 2023-06-05 DIAGNOSIS — I10 PRIMARY HYPERTENSION: ICD-10-CM

## 2023-06-05 DIAGNOSIS — I87.2 VENOUS INSUFFICIENCY OF BOTH LOWER EXTREMITIES: ICD-10-CM

## 2023-06-05 DIAGNOSIS — M25.511 RIGHT SHOULDER PAIN, UNSPECIFIED CHRONICITY: Primary | ICD-10-CM

## 2023-06-05 DIAGNOSIS — R73.9 BORDERLINE HYPERGLYCEMIA: ICD-10-CM

## 2023-06-05 DIAGNOSIS — I48.19 PERSISTENT ATRIAL FIBRILLATION (HCC): ICD-10-CM

## 2023-06-05 DIAGNOSIS — I42.2 HYPERTROPHIC CARDIOMYOPATHY (HCC): ICD-10-CM

## 2023-06-05 DIAGNOSIS — E78.2 MIXED HYPERLIPIDEMIA: ICD-10-CM

## 2023-06-05 PROCEDURE — G8417 CALC BMI ABV UP PARAM F/U: HCPCS | Performed by: INTERNAL MEDICINE

## 2023-06-05 PROCEDURE — G8427 DOCREV CUR MEDS BY ELIG CLIN: HCPCS | Performed by: INTERNAL MEDICINE

## 2023-06-05 PROCEDURE — 3074F SYST BP LT 130 MM HG: CPT | Performed by: INTERNAL MEDICINE

## 2023-06-05 PROCEDURE — 3079F DIAST BP 80-89 MM HG: CPT | Performed by: INTERNAL MEDICINE

## 2023-06-05 PROCEDURE — 99204 OFFICE O/P NEW MOD 45 MIN: CPT | Performed by: INTERNAL MEDICINE

## 2023-06-05 PROCEDURE — 3017F COLORECTAL CA SCREEN DOC REV: CPT | Performed by: INTERNAL MEDICINE

## 2023-06-05 PROCEDURE — 1036F TOBACCO NON-USER: CPT | Performed by: INTERNAL MEDICINE

## 2023-06-05 PROCEDURE — 1123F ACP DISCUSS/DSCN MKR DOCD: CPT | Performed by: INTERNAL MEDICINE

## 2023-06-05 SDOH — ECONOMIC STABILITY: FOOD INSECURITY: WITHIN THE PAST 12 MONTHS, THE FOOD YOU BOUGHT JUST DIDN'T LAST AND YOU DIDN'T HAVE MONEY TO GET MORE.: NEVER TRUE

## 2023-06-05 SDOH — ECONOMIC STABILITY: FOOD INSECURITY: WITHIN THE PAST 12 MONTHS, YOU WORRIED THAT YOUR FOOD WOULD RUN OUT BEFORE YOU GOT MONEY TO BUY MORE.: NEVER TRUE

## 2023-06-05 SDOH — ECONOMIC STABILITY: HOUSING INSECURITY
IN THE LAST 12 MONTHS, WAS THERE A TIME WHEN YOU DID NOT HAVE A STEADY PLACE TO SLEEP OR SLEPT IN A SHELTER (INCLUDING NOW)?: NO

## 2023-06-05 SDOH — ECONOMIC STABILITY: INCOME INSECURITY: HOW HARD IS IT FOR YOU TO PAY FOR THE VERY BASICS LIKE FOOD, HOUSING, MEDICAL CARE, AND HEATING?: NOT HARD AT ALL

## 2023-06-05 ASSESSMENT — ANXIETY QUESTIONNAIRES
6. BECOMING EASILY ANNOYED OR IRRITABLE: 0
4. TROUBLE RELAXING: 0
IF YOU CHECKED OFF ANY PROBLEMS ON THIS QUESTIONNAIRE, HOW DIFFICULT HAVE THESE PROBLEMS MADE IT FOR YOU TO DO YOUR WORK, TAKE CARE OF THINGS AT HOME, OR GET ALONG WITH OTHER PEOPLE: NOT DIFFICULT AT ALL
GAD7 TOTAL SCORE: 0
1. FEELING NERVOUS, ANXIOUS, OR ON EDGE: 0
3. WORRYING TOO MUCH ABOUT DIFFERENT THINGS: 0
7. FEELING AFRAID AS IF SOMETHING AWFUL MIGHT HAPPEN: 0
5. BEING SO RESTLESS THAT IT IS HARD TO SIT STILL: 0
2. NOT BEING ABLE TO STOP OR CONTROL WORRYING: 0

## 2023-06-05 ASSESSMENT — PATIENT HEALTH QUESTIONNAIRE - PHQ9
SUM OF ALL RESPONSES TO PHQ9 QUESTIONS 1 & 2: 0
SUM OF ALL RESPONSES TO PHQ QUESTIONS 1-9: 0
2. FEELING DOWN, DEPRESSED OR HOPELESS: 0
1. LITTLE INTEREST OR PLEASURE IN DOING THINGS: 0
SUM OF ALL RESPONSES TO PHQ QUESTIONS 1-9: 0

## 2023-06-05 NOTE — PROGRESS NOTES
1. \"Have you been to the ER, urgent care clinic since your last visit? Hospitalized since your last visit? \" No    2. \"Have you seen or consulted any other health care providers outside of the [address] [place] [address] since your last visit? \" No     3. For patients aged 39-70: Has the patient had a colonoscopy / FIT/ Cologuard?  Yes - no Care Gap present
tobacco use about 10 years ago. He reports current alcohol use of about 1.0 standard drink per week. He reports that he does not use drugs. ROS: See above; Complete ROS otherwise negative. OBJECTIVE:   Vitals: /87 (Site: Left Upper Arm, Position: Sitting, Cuff Size: Medium Adult)   Pulse (!) 104   Temp 97.5 °F (36.4 °C) (Temporal)   Resp 18   Ht 5' 8\" (1.727 m)   Wt 206 lb 12.8 oz (93.8 kg)   SpO2 95%   BMI 31.44 kg/m²    Gen: Pleasant 68 y.o.  male in NAD. HEENT: PERRLA. EOMI. OP moist and pink. Neck: Supple. No LAD. HEART: RRR, No M/G/R.    LUNGS: CTAB No W/R. ABDOMEN: S, NT, ND, BS+. EXTREMITIES: Warm. No C/C/E.  MUSCULOSKELETAL: Slight limitation of ROM--elevation of R shoulder, muscle strength 5/5 all groups. NEURO: Alert and oriented x 3. Cranial nerves grossly intact. No focal sensory or motor deficits noted. SKIN: Warm. Dry. No rashes or other lesions noted.     Lab Results   Component Value Date/Time     08/30/2021 09:29 AM    K 4.9 08/30/2021 09:29 AM     08/30/2021 09:29 AM    CO2 27 08/30/2021 09:29 AM    BUN 24 08/30/2021 09:29 AM    GFRAA 53 08/30/2021 09:29 AM    ALT 13 08/30/2021 09:29 AM       Lab Results   Component Value Date/Time    CHOL 151 08/30/2021 09:29 AM    HDL 46 08/30/2021 09:29 AM        No results found for: HBA1C    No results found for: WBC, HGBPOC, HGB, HGBP, HCTPOC, HCT, PHCT, PLT, MCV

## 2023-06-23 ENCOUNTER — PREP FOR PROCEDURE (OUTPATIENT)
Age: 73
End: 2023-06-23

## 2023-06-23 DIAGNOSIS — I48.19 PERSISTENT ATRIAL FIBRILLATION (HCC): Primary | ICD-10-CM

## 2023-06-23 RX ORDER — SODIUM CHLORIDE 0.9 % (FLUSH) 0.9 %
5-40 SYRINGE (ML) INJECTION EVERY 12 HOURS SCHEDULED
OUTPATIENT
Start: 2023-06-23

## 2023-06-23 RX ORDER — SODIUM CHLORIDE 9 MG/ML
INJECTION, SOLUTION INTRAVENOUS PRN
OUTPATIENT
Start: 2023-06-23

## 2023-06-23 RX ORDER — SODIUM CHLORIDE 0.9 % (FLUSH) 0.9 %
5-40 SYRINGE (ML) INJECTION PRN
OUTPATIENT
Start: 2023-06-23

## 2023-08-01 NOTE — TELEPHONE ENCOUNTER
?--from Dr Edie Sawyer office callling to let  know pt's left knee replacement went well today    FYI Patient calling because he is having issues with his blood pressure. Pt stated that he called yesterday and that no one called him back. Patient has also requested to speak to Dr. Prieto French regarding being referred to a cardiac rehab. Please callback and advise. Callback : [phone].     Thanks,  Robert Wells

## 2023-08-17 NOTE — TELEPHONE ENCOUNTER
90 day supply    Caller requests Refill of:  levothyroxine (SYNTHROID) 50 MCG tablet      Please send to:  [facility] [address] [phone] Jatinder Briscoe [phone]          Visit / Appointment History:  Future Appointments:  Future Appointments   Date Time Provider Department Center   8/23/2023 12:45 PM Terry Romberg, MD [facility] BS AMB   8/23/2023  1:00 PM \[facility] [place] [facility]\"" PAT ROOM P1 \[facility] [place] [facility]\"" PAT RI OR Pre As   8/28/2023  7:45 AM Terry Romberg, MD [facility] BS AMB   12/5/2023 10:30 AM Rebekah Zamorano MD [facility] BS AMB         Last Appointment With Me:  6/5/2023         Caller confirmed instructions and dosages as correct. Caller was advised that Meds will be refilled as soon as possible, however there can be a 48-72 business hour turn around on refill requests.

## 2023-08-18 RX ORDER — LEVOTHYROXINE SODIUM 0.05 MG/1
50 TABLET ORAL
Qty: 30 TABLET | Refills: 11 | Status: SHIPPED | OUTPATIENT
Start: 2023-08-18

## 2023-08-23 ENCOUNTER — PREP FOR PROCEDURE (OUTPATIENT)
Age: 73
End: 2023-08-23

## 2023-08-23 ENCOUNTER — ANESTHESIA EVENT (OUTPATIENT)
Facility: HOSPITAL | Age: 73
DRG: 273 | End: 2023-08-23
Payer: MEDICARE

## 2023-08-23 ENCOUNTER — HOSPITAL ENCOUNTER (OUTPATIENT)
Facility: HOSPITAL | Age: 73
Discharge: HOME | End: 2023-08-26
Payer: MEDICARE

## 2023-08-23 VITALS
HEIGHT: 68 IN | BODY MASS INDEX: 31.17 KG/M2 | DIASTOLIC BLOOD PRESSURE: 100 MMHG | OXYGEN SATURATION: 95 % | SYSTOLIC BLOOD PRESSURE: 132 MMHG | HEART RATE: 76 BPM | RESPIRATION RATE: 18 BRPM | WEIGHT: 205.69 LBS | TEMPERATURE: 98.2 F

## 2023-08-23 DIAGNOSIS — I48.19 PERSISTENT ATRIAL FIBRILLATION (HCC): Primary | ICD-10-CM

## 2023-08-23 DIAGNOSIS — I48.91 ATRIAL FIBRILLATION, UNSPECIFIED TYPE (HCC): Primary | ICD-10-CM

## 2023-08-23 DIAGNOSIS — I48.19 PERSISTENT ATRIAL FIBRILLATION (HCC): ICD-10-CM

## 2023-08-23 LAB
ALBUMIN SERPL-MCNC: 3.9 G/DL (ref 3.5–5)
ALBUMIN/GLOB SERPL: 1.2 (ref 1.1–2.2)
ALP SERPL-CCNC: 53 U/L (ref 45–117)
ALT SERPL-CCNC: 22 U/L (ref 12–78)
ANION GAP SERPL CALC-SCNC: 4 MMOL/L (ref 5–15)
APPEARANCE UR: CLEAR
APTT PPP: 41.8 SEC (ref 22.1–31)
ARTERIAL PATENCY WRIST A: YES
AST SERPL-CCNC: 10 U/L (ref 15–37)
BACTERIA URNS QL MICRO: NEGATIVE /HPF
BASE EXCESS BLDA CALC-SCNC: 2.6 MMOL/L
BASOPHILS # BLD: 0 K/UL (ref 0–0.1)
BASOPHILS NFR BLD: 0 % (ref 0–1)
BDY SITE: ABNORMAL
BILIRUB SERPL-MCNC: 0.5 MG/DL (ref 0.2–1)
BILIRUB UR QL: NEGATIVE
BUN SERPL-MCNC: 26 MG/DL (ref 6–20)
BUN/CREAT SERPL: 18 (ref 12–20)
CALCIUM SERPL-MCNC: 9 MG/DL (ref 8.5–10.1)
CHLORIDE SERPL-SCNC: 106 MMOL/L (ref 97–108)
CO2 SERPL-SCNC: 28 MMOL/L (ref 21–32)
COLOR UR: NORMAL
CREAT SERPL-MCNC: 1.41 MG/DL (ref 0.7–1.3)
DIFFERENTIAL METHOD BLD: ABNORMAL
EOSINOPHIL # BLD: 0 K/UL (ref 0–0.4)
EOSINOPHIL NFR BLD: 0 % (ref 0–7)
EPITH CASTS URNS QL MICRO: NORMAL /LPF
ERYTHROCYTE [DISTWIDTH] IN BLOOD BY AUTOMATED COUNT: 12.9 % (ref 11.5–14.5)
EST. AVERAGE GLUCOSE BLD GHB EST-MCNC: 111 MG/DL
GLOBULIN SER CALC-MCNC: 3.2 G/DL (ref 2–4)
GLUCOSE SERPL-MCNC: 107 MG/DL (ref 65–100)
GLUCOSE UR STRIP.AUTO-MCNC: NEGATIVE MG/DL
HBA1C MFR BLD: 5.5 % (ref 4–5.6)
HCO3 BLDA-SCNC: 28 MMOL/L (ref 22–26)
HCT VFR BLD AUTO: 43.4 % (ref 36.6–50.3)
HGB BLD-MCNC: 14.7 G/DL (ref 12.1–17)
HGB UR QL STRIP: NEGATIVE
HISTORY CHECK: NORMAL
HYALINE CASTS URNS QL MICRO: NORMAL /LPF (ref 0–2)
IMM GRANULOCYTES # BLD AUTO: 0 K/UL (ref 0–0.04)
IMM GRANULOCYTES NFR BLD AUTO: 0 % (ref 0–0.5)
INR PPP: 1.5 (ref 0.9–1.1)
KETONES UR QL STRIP.AUTO: NEGATIVE MG/DL
LEUKOCYTE ESTERASE UR QL STRIP.AUTO: NEGATIVE
LYMPHOCYTES # BLD: 1.1 K/UL (ref 0.8–3.5)
LYMPHOCYTES NFR BLD: 14 % (ref 12–49)
MAGNESIUM SERPL-MCNC: 2.5 MG/DL (ref 1.6–2.4)
MCH RBC QN AUTO: 33.5 PG (ref 26–34)
MCHC RBC AUTO-ENTMCNC: 33.9 G/DL (ref 30–36.5)
MCV RBC AUTO: 98.9 FL (ref 80–99)
MONOCYTES # BLD: 0.6 K/UL (ref 0–1)
MONOCYTES NFR BLD: 8 % (ref 5–13)
NEUTS SEG # BLD: 5.7 K/UL (ref 1.8–8)
NEUTS SEG NFR BLD: 78 % (ref 32–75)
NITRITE UR QL STRIP.AUTO: NEGATIVE
NRBC # BLD: 0 K/UL (ref 0–0.01)
NRBC BLD-RTO: 0 PER 100 WBC
NT PRO BNP: 4754 PG/ML
PCO2 BLDA: 46 MMHG (ref 35–45)
PH BLDA: 7.4 (ref 7.35–7.45)
PH UR STRIP: 5 (ref 5–8)
PLATELET # BLD AUTO: 269 K/UL (ref 150–400)
PMV BLD AUTO: 10.4 FL (ref 8.9–12.9)
PO2 BLDA: 73 MMHG (ref 80–100)
POTASSIUM SERPL-SCNC: 4.1 MMOL/L (ref 3.5–5.1)
PROT SERPL-MCNC: 7.1 G/DL (ref 6.4–8.2)
PROT UR STRIP-MCNC: NEGATIVE MG/DL
PROTHROMBIN TIME: 15.3 SEC (ref 9–11.1)
RBC # BLD AUTO: 4.39 M/UL (ref 4.1–5.7)
RBC #/AREA URNS HPF: NORMAL /HPF (ref 0–5)
SAO2 % BLD: 95 % (ref 92–97)
SAO2% DEVICE SAO2% SENSOR NAME: ABNORMAL
SODIUM SERPL-SCNC: 138 MMOL/L (ref 136–145)
SP GR UR REFRACTOMETRY: 1.01
SPECIMEN SITE: ABNORMAL
THERAPEUTIC RANGE: ABNORMAL SECS (ref 58–77)
TSH SERPL DL<=0.05 MIU/L-ACNC: 3.33 UIU/ML (ref 0.36–3.74)
URINE CULTURE IF INDICATED: NORMAL
UROBILINOGEN UR QL STRIP.AUTO: 0.2 EU/DL (ref 0.2–1)
WBC # BLD AUTO: 7.5 K/UL (ref 4.1–11.1)
WBC URNS QL MICRO: NORMAL /HPF (ref 0–4)

## 2023-08-23 PROCEDURE — 81001 URINALYSIS AUTO W/SCOPE: CPT

## 2023-08-23 PROCEDURE — 84443 ASSAY THYROID STIM HORMONE: CPT

## 2023-08-23 PROCEDURE — 71046 X-RAY EXAM CHEST 2 VIEWS: CPT

## 2023-08-23 PROCEDURE — 87635 SARS-COV-2 COVID-19 AMP PRB: CPT

## 2023-08-23 PROCEDURE — 86850 RBC ANTIBODY SCREEN: CPT

## 2023-08-23 PROCEDURE — 85025 COMPLETE CBC W/AUTO DIFF WBC: CPT

## 2023-08-23 PROCEDURE — 36415 COLL VENOUS BLD VENIPUNCTURE: CPT

## 2023-08-23 PROCEDURE — 80053 COMPREHEN METABOLIC PANEL: CPT

## 2023-08-23 PROCEDURE — 83880 ASSAY OF NATRIURETIC PEPTIDE: CPT

## 2023-08-23 PROCEDURE — 85730 THROMBOPLASTIN TIME PARTIAL: CPT

## 2023-08-23 PROCEDURE — 86900 BLOOD TYPING SEROLOGIC ABO: CPT

## 2023-08-23 PROCEDURE — 85610 PROTHROMBIN TIME: CPT

## 2023-08-23 PROCEDURE — 83036 HEMOGLOBIN GLYCOSYLATED A1C: CPT

## 2023-08-23 PROCEDURE — 86901 BLOOD TYPING SEROLOGIC RH(D): CPT

## 2023-08-23 PROCEDURE — 86923 COMPATIBILITY TEST ELECTRIC: CPT

## 2023-08-23 PROCEDURE — 83735 ASSAY OF MAGNESIUM: CPT

## 2023-08-23 PROCEDURE — 82803 BLOOD GASES ANY COMBINATION: CPT

## 2023-08-23 PROCEDURE — 36600 WITHDRAWAL OF ARTERIAL BLOOD: CPT

## 2023-08-23 RX ORDER — SODIUM CHLORIDE 9 MG/ML
INJECTION, SOLUTION INTRAVENOUS PRN
Status: CANCELLED | OUTPATIENT
Start: 2023-08-23

## 2023-08-23 RX ORDER — ENOXAPARIN SODIUM 100 MG/ML
1 INJECTION SUBCUTANEOUS 2 TIMES DAILY
Qty: 2 ML | Refills: 0 | Status: SHIPPED | OUTPATIENT
Start: 2023-08-26 | End: 2023-08-27

## 2023-08-23 RX ORDER — SODIUM CHLORIDE 0.9 % (FLUSH) 0.9 %
5-40 SYRINGE (ML) INJECTION EVERY 12 HOURS SCHEDULED
Status: CANCELLED | OUTPATIENT
Start: 2023-08-23

## 2023-08-23 RX ORDER — CHLORHEXIDINE GLUCONATE 0.12 MG/ML
15 RINSE ORAL 2 TIMES DAILY
Qty: 60 ML | Refills: 0 | Status: SHIPPED | OUTPATIENT
Start: 2023-08-26 | End: 2023-08-24 | Stop reason: SDUPTHER

## 2023-08-23 RX ORDER — SODIUM CHLORIDE 0.9 % (FLUSH) 0.9 %
5-40 SYRINGE (ML) INJECTION PRN
Status: CANCELLED | OUTPATIENT
Start: 2023-08-23

## 2023-08-23 NOTE — CONSENT
Informed Consent for Blood Component Transfusion Note    I have discussed with the patient the rationale for blood component transfusion; its benefits in treating or preventing fatigue, organ damage, or death; and its risk which includes mild transfusion reactions, rare risk of blood borne infection, or more serious but rare reactions. I have discussed the alternatives to transfusion, including the risk and consequences of not receiving transfusion. The patient had an opportunity to ask questions and had agreed to proceed with transfusion of blood components.     Electronically signed by JUAN Blanco on 8/23/23 at 1:54 PM EDT

## 2023-08-23 NOTE — PERIOP NOTE

## 2023-08-23 NOTE — H&P
CSS   History and Physical    Subjective:    ** Information obtained from previous visit with updates added:  Derick Clifton is a 68 y.o. male who was referred for cardiac evaluation for persistent atrial fibrillation by Dr. Robson Tavarez. Pt has a significant PMH of afib, HTN, HLD, cardiomyopathy, carotid atherosclerosis, throat cancer s/p radiation, esophageal stricture s/p esophageal dilation 2013, hypothyroid. Previous ablations 2017. Pt denies SINDHU. Taking xarelto for [facility] and denies h/o GIB. Denies previous surgeries/trauma to chest. MR is noted as trace. Denies being on testosterone or other hormonal replacement therapy. Pt consumes occasional tea caffeinated beverages. He complains of palpitations. He denies SOB, CP, fatigue, feeling lightheaded. Cardioversion 5/11/23 procedure aborted due to inability to pass the JOHN probe past upper esophagus despite direct visualization with Glidescope (with the help of anesthesiologist). Mr. Thompson Post reports a history of throat cancer and difficulty swallowing with regurgitation of swallowed food materials. He has received radiation to throat for cancer. He had a barium swallow yesterday and is following with GI. He is awaiting results of barium swallow to schedule the next steps. Pt lives alone. 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 He is a nonsmoker, drinks none/day, and denies illicit drug use. Pt has a significant family history of afib. He's had the J&J COVID-19 vaccine.     Cardiac Testing  Cardiac catheterization: none    ECHO:      Past Medical History:   Diagnosis Date    Arrhythmia     a fib    Atrial fibrillation ([address])     Cancer ([address])     skin, throat cancer    Congestive heart failure ([address]) 11/2020    hosp at [facility]    G tube feedings ([address])     PEG    High cholesterol     Hypertension     Hypertrophic cardiomyopathy ([address])     per cardiology notes 6/2013    Long term current use of anticoagulant therapy     Status post chemoradiation     completed 12/2013    Throat cancer [facility])      Past Surgical History:   Procedure Laterality Date    CARDIAC CATHETERIZATION  2007    COLONOSCOPY N/A 1/17/2017    COLONOSCOPY performed by Greer Lefort, MD at hospitals ENDOSCOPY    EGD BALLOON DILATION ESOPHAGUS <30 MM DIAM  11/8/2013         ORTHOPEDIC SURGERY      Rt. hand surgery    OTHER SURGICAL HISTORY      exc. skin cancer    OTHER SURGICAL HISTORY      peg tube inserted    TONSILLECTOMY        Social History     Tobacco Use    Smoking status: Never    Smokeless tobacco: Former     Quit date: 3/30/2013   Substance Use Topics    Alcohol use: Yes     Alcohol/week: 1.0 standard drink     Comment: occassional drink      Family History   Problem Relation Age of Onset    Heart Attack Mother     Cancer Father     Atrial Fibrillation Sister     Atrial Fibrillation Brother      Prior to Admission medications    Medication Sig Start Date End Date Taking?  Authorizing Provider   Radha Root (RADHA PO) Take 3,200 mg by mouth daily    Historical Provider, MD Jen Bettencourt PO Take by mouth Daily    Historical Provider, MD   levothyroxine (SYNTHROID) 50 MCG tablet Take 1 tablet by mouth every morning (before breakfast) 8/18/23   Eli Hathaway MD   rivaroxaban (XARELTO) 20 MG TABS tablet Take 1 tablet by mouth daily    Historical Provider, MD   dilTIAZem (DILACOR XR) 240 MG extended release capsule Take 1 capsule by mouth daily    Historical Provider, MD   carvedilol (COREG) 6.25 MG tablet Take by mouth 2 times daily (with meals) 6/22/21   Ar Automatic Reconciliation   furosemide (LASIX) 20 MG tablet Take by mouth daily 12/15/21   Ar Automatic Reconciliation   isosorbide mononitrate (IMDUR) 60 MG extended release tablet Take by mouth daily 9/24/21   Ar Automatic Reconciliation   losartan (COZAAR) 50 MG tablet 1 tablet daily 3/7/22   Ar Automatic Reconciliation   lovastatin (MEVACOR) 40 MG tablet TAKE 1 TABLET DAILY 4/4/22

## 2023-08-23 NOTE — PERIOP NOTE
Vanessa Guerrero, NP notified of BP readings during PAT appt. Called and notified Vanessa Guerrero.  NP of lab results: BNP, PT/INR, and p02 from [facility]

## 2023-08-23 NOTE — PROGRESS NOTES
Excess, Arterial 2.6 mmol/L    O2 Method ROOM AIR      Source ARTERIAL      Site RIGHT RADIAL      Aiden Test YES           Assessment:       Active Problems:    * No active hospital problems. *  Resolved Problems:    * No resolved hospital problems. *      ASA4    Plan/Recommendations/Medical Decision Making:       Anesthetic Plan: GETA with invasive monitoring, post op ventilation and JOHN monitoring  Risks and benefits of the anesthetic plan discussed and agreed upon by the patient. Patient has a history of radiation to the neck and esophageal stricture. Prior aborted JOHN in May earlier this year as anesthesiologist was unable to pass the probe despite using glidescope. Plan is to have GI assist with EGD and dilatation prior to attempting to pass JOHN probe.   Signed By: Evon Delong MD  Date:           8/23/2023  Time:          3:24 PM

## 2023-08-24 DIAGNOSIS — I48.91 ATRIAL FIBRILLATION, UNSPECIFIED TYPE (HCC): ICD-10-CM

## 2023-08-24 LAB
EKG ATRIAL RATE: 394 BPM
EKG DIAGNOSIS: NORMAL
EKG Q-T INTERVAL: 388 MS
EKG QRS DURATION: 86 MS
EKG QTC CALCULATION (BAZETT): 439 MS
EKG R AXIS: -15 DEGREES
EKG T AXIS: 147 DEGREES
EKG VENTRICULAR RATE: 77 BPM
SARS-COV-2 RNA RESP QL NAA+PROBE: NOT DETECTED
SOURCE: NORMAL

## 2023-08-24 RX ORDER — CHLORHEXIDINE GLUCONATE 0.12 MG/ML
15 RINSE ORAL 2 TIMES DAILY
Qty: 60 ML | Refills: 0 | Status: SHIPPED | OUTPATIENT
Start: 2023-08-26 | End: 2023-08-28

## 2023-08-27 LAB
ABO + RH BLD: NORMAL
BLD PROD TYP BPU: NORMAL
BLOOD BANK DISPENSE STATUS: NORMAL
BLOOD GROUP ANTIBODIES SERPL: NORMAL
BPU ID: NORMAL
CROSSMATCH RESULT: NORMAL
SPECIMEN EXP DATE BLD: NORMAL
UNIT DIVISION: 0

## 2023-08-28 ENCOUNTER — HOSPITAL ENCOUNTER (INPATIENT)
Facility: HOSPITAL | Age: 73
LOS: 3 days | Discharge: HOME | DRG: 273 | End: 2023-08-31
Attending: THORACIC SURGERY (CARDIOTHORACIC VASCULAR SURGERY) | Admitting: THORACIC SURGERY (CARDIOTHORACIC VASCULAR SURGERY)
Payer: MEDICARE

## 2023-08-28 ENCOUNTER — APPOINTMENT (OUTPATIENT)
Facility: HOSPITAL | Age: 73
DRG: 273 | End: 2023-08-28
Attending: THORACIC SURGERY (CARDIOTHORACIC VASCULAR SURGERY)
Payer: MEDICARE

## 2023-08-28 ENCOUNTER — ANESTHESIA (OUTPATIENT)
Facility: HOSPITAL | Age: 73
DRG: 273 | End: 2023-08-28
Payer: MEDICARE

## 2023-08-28 DIAGNOSIS — I48.91 ATRIAL FIBRILLATION (HCC): ICD-10-CM

## 2023-08-28 DIAGNOSIS — Z98.890 S/P LEFT ATRIAL APPENDAGE LIGATION: ICD-10-CM

## 2023-08-28 DIAGNOSIS — I48.21 PERMANENT ATRIAL FIBRILLATION (HCC): Primary | ICD-10-CM

## 2023-08-28 DIAGNOSIS — N17.9 AKI (ACUTE KIDNEY INJURY) (HCC): ICD-10-CM

## 2023-08-28 PROBLEM — Z86.79 S/P ABLATION OPERATION FOR ARRHYTHMIA: Status: ACTIVE | Noted: 2023-08-28

## 2023-08-28 LAB
ACT BLD: 125 SECS (ref 79–138)
ACT BLD: 269 SECS (ref 79–138)
ACT BLD: 269 SECS (ref 79–138)
ACT BLD: 287 SECS (ref 79–138)
ALBUMIN SERPL-MCNC: 3.5 G/DL (ref 3.5–5)
ALBUMIN/GLOB SERPL: 1.1 (ref 1.1–2.2)
ALP SERPL-CCNC: 47 U/L (ref 45–117)
ALT SERPL-CCNC: 21 U/L (ref 12–78)
ANION GAP SERPL CALC-SCNC: 5 MMOL/L (ref 5–15)
APTT PPP: 30.8 SEC (ref 22.1–31)
AST SERPL-CCNC: 23 U/L (ref 15–37)
BILIRUB SERPL-MCNC: 0.5 MG/DL (ref 0.2–1)
BUN SERPL-MCNC: 26 MG/DL (ref 6–20)
BUN/CREAT SERPL: 20 (ref 12–20)
CALCIUM SERPL-MCNC: 8.5 MG/DL (ref 8.5–10.1)
CHLORIDE SERPL-SCNC: 110 MMOL/L (ref 97–108)
CO2 SERPL-SCNC: 25 MMOL/L (ref 21–32)
CREAT SERPL-MCNC: 1.31 MG/DL (ref 0.7–1.3)
ECHO BSA: 2.1 M2
ERYTHROCYTE [DISTWIDTH] IN BLOOD BY AUTOMATED COUNT: 13.1 % (ref 11.5–14.5)
GLOBULIN SER CALC-MCNC: 3.1 G/DL (ref 2–4)
GLUCOSE BLD STRIP.AUTO-MCNC: 144 MG/DL (ref 65–117)
GLUCOSE BLD STRIP.AUTO-MCNC: 152 MG/DL (ref 65–117)
GLUCOSE SERPL-MCNC: 171 MG/DL (ref 65–100)
HCT VFR BLD AUTO: 43.1 % (ref 36.6–50.3)
HGB BLD-MCNC: 14.3 G/DL (ref 12.1–17)
INR PPP: 1 (ref 0.9–1.1)
INR PPP: 1.1 (ref 0.9–1.1)
MAGNESIUM SERPL-MCNC: 2.3 MG/DL (ref 1.6–2.4)
MCH RBC QN AUTO: 33.1 PG (ref 26–34)
MCHC RBC AUTO-ENTMCNC: 33.2 G/DL (ref 30–36.5)
MCV RBC AUTO: 99.8 FL (ref 80–99)
NRBC # BLD: 0 K/UL (ref 0–0.01)
NRBC BLD-RTO: 0 PER 100 WBC
PLATELET # BLD AUTO: 229 K/UL (ref 150–400)
PMV BLD AUTO: 10.6 FL (ref 8.9–12.9)
POTASSIUM SERPL-SCNC: 4.6 MMOL/L (ref 3.5–5.1)
PROT SERPL-MCNC: 6.6 G/DL (ref 6.4–8.2)
PROTHROMBIN TIME: 10.7 SEC (ref 9–11.1)
PROTHROMBIN TIME: 11.3 SEC (ref 9–11.1)
RBC # BLD AUTO: 4.32 M/UL (ref 4.1–5.7)
SERVICE CMNT-IMP: ABNORMAL
SERVICE CMNT-IMP: ABNORMAL
SODIUM SERPL-SCNC: 140 MMOL/L (ref 136–145)
THERAPEUTIC RANGE: NORMAL SECS (ref 58–77)
WBC # BLD AUTO: 13.4 K/UL (ref 4.1–11.1)

## 2023-08-28 PROCEDURE — 4A0234Z MEASUREMENT OF CARDIAC ELECTRICAL ACTIVITY, PERCUTANEOUS APPROACH: ICD-10-PCS | Performed by: INTERNAL MEDICINE

## 2023-08-28 PROCEDURE — 6370000000 HC RX 637 (ALT 250 FOR IP): Performed by: PHYSICIAN ASSISTANT

## 2023-08-28 PROCEDURE — 2580000003 HC RX 258: Performed by: PHYSICIAN ASSISTANT

## 2023-08-28 PROCEDURE — 4A023FZ MEASUREMENT OF CARDIAC RHYTHM, PERCUTANEOUS APPROACH: ICD-10-PCS | Performed by: INTERNAL MEDICINE

## 2023-08-28 PROCEDURE — 33265 ABLATE ATRIA LMTD ENDO: CPT | Performed by: THORACIC SURGERY (CARDIOTHORACIC VASCULAR SURGERY)

## 2023-08-28 PROCEDURE — 6360000002 HC RX W HCPCS: Performed by: NURSE ANESTHETIST, CERTIFIED REGISTERED

## 2023-08-28 PROCEDURE — 6360000002 HC RX W HCPCS: Performed by: ANESTHESIOLOGY

## 2023-08-28 PROCEDURE — C1893 INTRO/SHEATH, FIXED,NON-PEEL: HCPCS | Performed by: INTERNAL MEDICINE

## 2023-08-28 PROCEDURE — 6360000002 HC RX W HCPCS: Performed by: THORACIC SURGERY (CARDIOTHORACIC VASCULAR SURGERY)

## 2023-08-28 PROCEDURE — 02584ZZ DESTRUCTION OF CONDUCTION MECHANISM, PERCUTANEOUS ENDOSCOPIC APPROACH: ICD-10-PCS | Performed by: THORACIC SURGERY (CARDIOTHORACIC VASCULAR SURGERY)

## 2023-08-28 PROCEDURE — 6370000000 HC RX 637 (ALT 250 FOR IP): Performed by: THORACIC SURGERY (CARDIOTHORACIC VASCULAR SURGERY)

## 2023-08-28 PROCEDURE — 6370000000 HC RX 637 (ALT 250 FOR IP): Performed by: INTERNAL MEDICINE

## 2023-08-28 PROCEDURE — 93656 COMPRE EP EVAL ABLTJ ATR FIB: CPT | Performed by: INTERNAL MEDICINE

## 2023-08-28 PROCEDURE — B24BZZ4 ULTRASONOGRAPHY OF HEART WITH AORTA, TRANSESOPHAGEAL: ICD-10-PCS | Performed by: ANESTHESIOLOGY

## 2023-08-28 PROCEDURE — 85730 THROMBOPLASTIN TIME PARTIAL: CPT

## 2023-08-28 PROCEDURE — C1766 INTRO/SHEATH,STRBLE,NON-PEEL: HCPCS | Performed by: INTERNAL MEDICINE

## 2023-08-28 PROCEDURE — 85347 COAGULATION TIME ACTIVATED: CPT

## 2023-08-28 PROCEDURE — 82962 GLUCOSE BLOOD TEST: CPT

## 2023-08-28 PROCEDURE — 2500000003 HC RX 250 WO HCPCS: Performed by: PHYSICIAN ASSISTANT

## 2023-08-28 PROCEDURE — 3700000001 HC ADD 15 MINUTES (ANESTHESIA): Performed by: THORACIC SURGERY (CARDIOTHORACIC VASCULAR SURGERY)

## 2023-08-28 PROCEDURE — 02583ZZ DESTRUCTION OF CONDUCTION MECHANISM, PERCUTANEOUS APPROACH: ICD-10-PCS | Performed by: THORACIC SURGERY (CARDIOTHORACIC VASCULAR SURGERY)

## 2023-08-28 PROCEDURE — 3600000017 HC SURGERY HYBRID ADDL 15MIN: Performed by: THORACIC SURGERY (CARDIOTHORACIC VASCULAR SURGERY)

## 2023-08-28 PROCEDURE — 2100000000 HC CCU R&B

## 2023-08-28 PROCEDURE — 76942 ECHO GUIDE FOR BIOPSY: CPT | Performed by: ANESTHESIOLOGY

## 2023-08-28 PROCEDURE — A4216 STERILE WATER/SALINE, 10 ML: HCPCS | Performed by: PHYSICIAN ASSISTANT

## 2023-08-28 PROCEDURE — C1889 IMPLANT/INSERT DEVICE, NOC: HCPCS | Performed by: THORACIC SURGERY (CARDIOTHORACIC VASCULAR SURGERY)

## 2023-08-28 PROCEDURE — 2500000003 HC RX 250 WO HCPCS: Performed by: NURSE ANESTHETIST, CERTIFIED REGISTERED

## 2023-08-28 PROCEDURE — 0DJ08ZZ INSPECTION OF UPPER INTESTINAL TRACT, VIA NATURAL OR ARTIFICIAL OPENING ENDOSCOPIC: ICD-10-PCS | Performed by: SPECIALIST

## 2023-08-28 PROCEDURE — 83735 ASSAY OF MAGNESIUM: CPT

## 2023-08-28 PROCEDURE — C1894 INTRO/SHEATH, NON-LASER: HCPCS | Performed by: INTERNAL MEDICINE

## 2023-08-28 PROCEDURE — 85027 COMPLETE CBC AUTOMATED: CPT

## 2023-08-28 PROCEDURE — 2709999900 HC NON-CHARGEABLE SUPPLY: Performed by: THORACIC SURGERY (CARDIOTHORACIC VASCULAR SURGERY)

## 2023-08-28 PROCEDURE — C1731 CATH, EP, 20 OR MORE ELEC: HCPCS | Performed by: INTERNAL MEDICINE

## 2023-08-28 PROCEDURE — 3600000007 HC SURGERY HYBRID BASE: Performed by: THORACIC SURGERY (CARDIOTHORACIC VASCULAR SURGERY)

## 2023-08-28 PROCEDURE — 02K83ZZ MAP CONDUCTION MECHANISM, PERCUTANEOUS APPROACH: ICD-10-PCS | Performed by: INTERNAL MEDICINE

## 2023-08-28 PROCEDURE — 02L74CK OCCLUSION OF LEFT ATRIAL APPENDAGE WITH EXTRALUMINAL DEVICE, PERCUTANEOUS ENDOSCOPIC APPROACH: ICD-10-PCS | Performed by: THORACIC SURGERY (CARDIOTHORACIC VASCULAR SURGERY)

## 2023-08-28 PROCEDURE — 2580000003 HC RX 258: Performed by: NURSE ANESTHETIST, CERTIFIED REGISTERED

## 2023-08-28 PROCEDURE — 2500000003 HC RX 250 WO HCPCS: Performed by: INTERNAL MEDICINE

## 2023-08-28 PROCEDURE — 36415 COLL VENOUS BLD VENIPUNCTURE: CPT

## 2023-08-28 PROCEDURE — C1732 CATH, EP, DIAG/ABL, 3D/VECT: HCPCS | Performed by: INTERNAL MEDICINE

## 2023-08-28 PROCEDURE — 2720000010 HC SURG SUPPLY STERILE: Performed by: INTERNAL MEDICINE

## 2023-08-28 PROCEDURE — 6360000002 HC RX W HCPCS: Performed by: NURSE PRACTITIONER

## 2023-08-28 PROCEDURE — 80053 COMPREHEN METABOLIC PANEL: CPT

## 2023-08-28 PROCEDURE — 6360000002 HC RX W HCPCS: Performed by: PHYSICIAN ASSISTANT

## 2023-08-28 PROCEDURE — C1729 CATH, DRAINAGE: HCPCS | Performed by: THORACIC SURGERY (CARDIOTHORACIC VASCULAR SURGERY)

## 2023-08-28 PROCEDURE — 2720000010 HC SURG SUPPLY STERILE: Performed by: THORACIC SURGERY (CARDIOTHORACIC VASCULAR SURGERY)

## 2023-08-28 PROCEDURE — 2580000003 HC RX 258: Performed by: STUDENT IN AN ORGANIZED HEALTH CARE EDUCATION/TRAINING PROGRAM

## 2023-08-28 PROCEDURE — 2709999900 HC NON-CHARGEABLE SUPPLY: Performed by: INTERNAL MEDICINE

## 2023-08-28 PROCEDURE — 85610 PROTHROMBIN TIME: CPT

## 2023-08-28 PROCEDURE — 3700000000 HC ANESTHESIA ATTENDED CARE: Performed by: THORACIC SURGERY (CARDIOTHORACIC VASCULAR SURGERY)

## 2023-08-28 PROCEDURE — 2580000003 HC RX 258: Performed by: INTERNAL MEDICINE

## 2023-08-28 PROCEDURE — 02583ZZ DESTRUCTION OF CONDUCTION MECHANISM, PERCUTANEOUS APPROACH: ICD-10-PCS | Performed by: INTERNAL MEDICINE

## 2023-08-28 PROCEDURE — 71045 X-RAY EXAM CHEST 1 VIEW: CPT

## 2023-08-28 PROCEDURE — 33265 ABLATE ATRIA LMTD ENDO: CPT | Performed by: PHYSICIAN ASSISTANT

## 2023-08-28 DEVICE — DEVICE OCCL CLP L50MM PRELD FOR THORACOSCOPIC PROC GILLINOV: Type: IMPLANTABLE DEVICE | Site: HEART | Status: FUNCTIONAL

## 2023-08-28 RX ORDER — LIDOCAINE HYDROCHLORIDE 20 MG/ML
INJECTION, SOLUTION EPIDURAL; INFILTRATION; INTRACAUDAL; PERINEURAL PRN
Status: DISCONTINUED | OUTPATIENT
Start: 2023-08-28 | End: 2023-08-28 | Stop reason: SDUPTHER

## 2023-08-28 RX ORDER — OXYCODONE HYDROCHLORIDE 5 MG/1
5 TABLET ORAL EVERY 4 HOURS PRN
Status: DISCONTINUED | OUTPATIENT
Start: 2023-08-28 | End: 2023-08-31 | Stop reason: HOSPADM

## 2023-08-28 RX ORDER — SODIUM CHLORIDE 0.9 % (FLUSH) 0.9 %
5-40 SYRINGE (ML) INJECTION PRN
Status: DISCONTINUED | OUTPATIENT
Start: 2023-08-28 | End: 2023-08-31 | Stop reason: HOSPADM

## 2023-08-28 RX ORDER — SODIUM CHLORIDE, SODIUM LACTATE, POTASSIUM CHLORIDE, CALCIUM CHLORIDE 600; 310; 30; 20 MG/100ML; MG/100ML; MG/100ML; MG/100ML
INJECTION, SOLUTION INTRAVENOUS CONTINUOUS
Status: DISCONTINUED | OUTPATIENT
Start: 2023-08-28 | End: 2023-08-28

## 2023-08-28 RX ORDER — INSULIN LISPRO 100 [IU]/ML
0-6 INJECTION, SOLUTION INTRAVENOUS; SUBCUTANEOUS NIGHTLY
Status: DISCONTINUED | OUTPATIENT
Start: 2023-08-28 | End: 2023-08-31 | Stop reason: HOSPADM

## 2023-08-28 RX ORDER — LANOLIN ALCOHOL/MO/W.PET/CERES
400 CREAM (GRAM) TOPICAL 2 TIMES DAILY
Status: DISCONTINUED | OUTPATIENT
Start: 2023-08-29 | End: 2023-08-31 | Stop reason: HOSPADM

## 2023-08-28 RX ORDER — ROCURONIUM BROMIDE 10 MG/ML
INJECTION, SOLUTION INTRAVENOUS PRN
Status: DISCONTINUED | OUTPATIENT
Start: 2023-08-28 | End: 2023-08-28 | Stop reason: SDUPTHER

## 2023-08-28 RX ORDER — INSULIN LISPRO 100 [IU]/ML
0-12 INJECTION, SOLUTION INTRAVENOUS; SUBCUTANEOUS
Status: DISCONTINUED | OUTPATIENT
Start: 2023-08-28 | End: 2023-08-31 | Stop reason: HOSPADM

## 2023-08-28 RX ORDER — DILTIAZEM HYDROCHLORIDE 240 MG/1
240 CAPSULE, COATED, EXTENDED RELEASE ORAL DAILY
Status: DISCONTINUED | OUTPATIENT
Start: 2023-08-29 | End: 2023-08-31 | Stop reason: HOSPADM

## 2023-08-28 RX ORDER — GLYCOPYRROLATE 0.2 MG/ML
INJECTION INTRAMUSCULAR; INTRAVENOUS PRN
Status: DISCONTINUED | OUTPATIENT
Start: 2023-08-28 | End: 2023-08-28 | Stop reason: SDUPTHER

## 2023-08-28 RX ORDER — MAGNESIUM SULFATE IN WATER 40 MG/ML
2000 INJECTION, SOLUTION INTRAVENOUS PRN
Status: DISCONTINUED | OUTPATIENT
Start: 2023-08-28 | End: 2023-08-31 | Stop reason: HOSPADM

## 2023-08-28 RX ORDER — ONDANSETRON 2 MG/ML
INJECTION INTRAMUSCULAR; INTRAVENOUS PRN
Status: DISCONTINUED | OUTPATIENT
Start: 2023-08-28 | End: 2023-08-28 | Stop reason: SDUPTHER

## 2023-08-28 RX ORDER — SODIUM CHLORIDE 9 MG/ML
INJECTION, SOLUTION INTRAVENOUS CONTINUOUS
Status: DISCONTINUED | OUTPATIENT
Start: 2023-08-28 | End: 2023-08-30

## 2023-08-28 RX ORDER — POLYETHYLENE GLYCOL 3350 17 G/17G
17 POWDER, FOR SOLUTION ORAL DAILY
Status: DISCONTINUED | OUTPATIENT
Start: 2023-08-28 | End: 2023-08-31 | Stop reason: HOSPADM

## 2023-08-28 RX ORDER — HEPARIN SODIUM 1000 [USP'U]/ML
INJECTION, SOLUTION INTRAVENOUS; SUBCUTANEOUS PRN
Status: DISCONTINUED | OUTPATIENT
Start: 2023-08-28 | End: 2023-08-28 | Stop reason: SDUPTHER

## 2023-08-28 RX ORDER — SUCCINYLCHOLINE/SOD CL,ISO/PF 200MG/10ML
SYRINGE (ML) INTRAVENOUS PRN
Status: DISCONTINUED | OUTPATIENT
Start: 2023-08-28 | End: 2023-08-28 | Stop reason: SDUPTHER

## 2023-08-28 RX ORDER — NEOSTIGMINE METHYLSULFATE 1 MG/ML
INJECTION, SOLUTION INTRAVENOUS PRN
Status: DISCONTINUED | OUTPATIENT
Start: 2023-08-28 | End: 2023-08-28 | Stop reason: SDUPTHER

## 2023-08-28 RX ORDER — HYDRALAZINE HYDROCHLORIDE 20 MG/ML
10 INJECTION INTRAMUSCULAR; INTRAVENOUS ONCE
Status: COMPLETED | OUTPATIENT
Start: 2023-08-28 | End: 2023-08-28

## 2023-08-28 RX ORDER — HYDRALAZINE HYDROCHLORIDE 20 MG/ML
10 INJECTION INTRAMUSCULAR; INTRAVENOUS EVERY 6 HOURS PRN
Status: DISCONTINUED | OUTPATIENT
Start: 2023-08-28 | End: 2023-08-28

## 2023-08-28 RX ORDER — SODIUM CHLORIDE 9 MG/ML
INJECTION, SOLUTION INTRAVENOUS PRN
Status: DISCONTINUED | OUTPATIENT
Start: 2023-08-28 | End: 2023-08-28 | Stop reason: HOSPADM

## 2023-08-28 RX ORDER — SODIUM CHLORIDE 0.9 % (FLUSH) 0.9 %
5-40 SYRINGE (ML) INJECTION PRN
Status: DISCONTINUED | OUTPATIENT
Start: 2023-08-28 | End: 2023-08-28 | Stop reason: HOSPADM

## 2023-08-28 RX ORDER — BUPIVACAINE HYDROCHLORIDE 2.5 MG/ML
INJECTION, SOLUTION EPIDURAL; INFILTRATION; INTRACAUDAL PRN
Status: DISCONTINUED | OUTPATIENT
Start: 2023-08-28 | End: 2023-08-28 | Stop reason: ALTCHOICE

## 2023-08-28 RX ORDER — ACETAMINOPHEN 500 MG
1000 TABLET ORAL EVERY 6 HOURS PRN
Status: DISCONTINUED | OUTPATIENT
Start: 2023-08-28 | End: 2023-08-31 | Stop reason: HOSPADM

## 2023-08-28 RX ORDER — ONDANSETRON 2 MG/ML
4 INJECTION INTRAMUSCULAR; INTRAVENOUS EVERY 4 HOURS PRN
Status: DISCONTINUED | OUTPATIENT
Start: 2023-08-28 | End: 2023-08-31 | Stop reason: HOSPADM

## 2023-08-28 RX ORDER — LABETALOL HYDROCHLORIDE 5 MG/ML
INJECTION, SOLUTION INTRAVENOUS PRN
Status: DISCONTINUED | OUTPATIENT
Start: 2023-08-28 | End: 2023-08-28 | Stop reason: SDUPTHER

## 2023-08-28 RX ORDER — SODIUM CHLORIDE 0.9 % (FLUSH) 0.9 %
5-40 SYRINGE (ML) INJECTION EVERY 12 HOURS SCHEDULED
Status: DISCONTINUED | OUTPATIENT
Start: 2023-08-28 | End: 2023-08-31 | Stop reason: HOSPADM

## 2023-08-28 RX ORDER — CARVEDILOL 3.12 MG/1
6.25 TABLET ORAL 2 TIMES DAILY WITH MEALS
Status: DISCONTINUED | OUTPATIENT
Start: 2023-08-28 | End: 2023-08-31 | Stop reason: HOSPADM

## 2023-08-28 RX ORDER — DIPHENHYDRAMINE HCL 25 MG
25 CAPSULE ORAL NIGHTLY PRN
Status: DISCONTINUED | OUTPATIENT
Start: 2023-08-29 | End: 2023-08-31

## 2023-08-28 RX ORDER — DEXTROSE MONOHYDRATE 100 MG/ML
INJECTION, SOLUTION INTRAVENOUS CONTINUOUS PRN
Status: DISCONTINUED | OUTPATIENT
Start: 2023-08-28 | End: 2023-08-31 | Stop reason: HOSPADM

## 2023-08-28 RX ORDER — LEVOTHYROXINE SODIUM 0.03 MG/1
50 TABLET ORAL
Status: DISCONTINUED | OUTPATIENT
Start: 2023-08-29 | End: 2023-08-31 | Stop reason: HOSPADM

## 2023-08-28 RX ORDER — EPHEDRINE SULFATE/0.9% NACL/PF 50 MG/5 ML
SYRINGE (ML) INTRAVENOUS PRN
Status: DISCONTINUED | OUTPATIENT
Start: 2023-08-28 | End: 2023-08-28 | Stop reason: SDUPTHER

## 2023-08-28 RX ORDER — AMIODARONE HYDROCHLORIDE 200 MG/1
200 TABLET ORAL 2 TIMES DAILY
Status: DISCONTINUED | OUTPATIENT
Start: 2023-08-28 | End: 2023-08-31 | Stop reason: HOSPADM

## 2023-08-28 RX ORDER — SODIUM CHLORIDE 0.9 % (FLUSH) 0.9 %
5-40 SYRINGE (ML) INJECTION EVERY 12 HOURS SCHEDULED
Status: DISCONTINUED | OUTPATIENT
Start: 2023-08-28 | End: 2023-08-28 | Stop reason: HOSPADM

## 2023-08-28 RX ORDER — BISACODYL 10 MG
10 SUPPOSITORY, RECTAL RECTAL DAILY PRN
Status: DISCONTINUED | OUTPATIENT
Start: 2023-08-28 | End: 2023-08-29

## 2023-08-28 RX ORDER — DEXAMETHASONE SODIUM PHOSPHATE 4 MG/ML
INJECTION, SOLUTION INTRA-ARTICULAR; INTRALESIONAL; INTRAMUSCULAR; INTRAVENOUS; SOFT TISSUE PRN
Status: DISCONTINUED | OUTPATIENT
Start: 2023-08-28 | End: 2023-08-28 | Stop reason: SDUPTHER

## 2023-08-28 RX ORDER — LIDOCAINE 4 G/G
2 PATCH TOPICAL DAILY
Status: DISCONTINUED | OUTPATIENT
Start: 2023-08-28 | End: 2023-08-31 | Stop reason: HOSPADM

## 2023-08-28 RX ORDER — PHENYLEPHRINE HYDROCHLORIDE 10 MG/ML
INJECTION INTRAVENOUS PRN
Status: DISCONTINUED | OUTPATIENT
Start: 2023-08-28 | End: 2023-08-28 | Stop reason: SDUPTHER

## 2023-08-28 RX ORDER — CHLORHEXIDINE GLUCONATE ORAL RINSE 1.2 MG/ML
15 SOLUTION DENTAL 2 TIMES DAILY
Status: DISCONTINUED | OUTPATIENT
Start: 2023-08-28 | End: 2023-08-31 | Stop reason: HOSPADM

## 2023-08-28 RX ORDER — PROTAMINE SULFATE 10 MG/ML
INJECTION, SOLUTION INTRAVENOUS PRN
Status: DISCONTINUED | OUTPATIENT
Start: 2023-08-28 | End: 2023-08-28 | Stop reason: SDUPTHER

## 2023-08-28 RX ORDER — ALBUTEROL SULFATE 2.5 MG/3ML
2.5 SOLUTION RESPIRATORY (INHALATION) EVERY 4 HOURS PRN
Status: DISCONTINUED | OUTPATIENT
Start: 2023-08-28 | End: 2023-08-31 | Stop reason: HOSPADM

## 2023-08-28 RX ORDER — PRAVASTATIN SODIUM 40 MG
40 TABLET ORAL DAILY
Status: DISCONTINUED | OUTPATIENT
Start: 2023-08-28 | End: 2023-08-31 | Stop reason: HOSPADM

## 2023-08-28 RX ORDER — HYDRALAZINE HYDROCHLORIDE 20 MG/ML
15 INJECTION INTRAMUSCULAR; INTRAVENOUS
Status: DISCONTINUED | OUTPATIENT
Start: 2023-08-28 | End: 2023-08-31

## 2023-08-28 RX ORDER — POTASSIUM CHLORIDE 29.8 MG/ML
20 INJECTION INTRAVENOUS PRN
Status: DISCONTINUED | OUTPATIENT
Start: 2023-08-28 | End: 2023-08-31

## 2023-08-28 RX ORDER — CEFAZOLIN SODIUM/WATER 2 G/20 ML
SYRINGE (ML) INTRAVENOUS PRN
Status: DISCONTINUED | OUTPATIENT
Start: 2023-08-28 | End: 2023-08-28 | Stop reason: SDUPTHER

## 2023-08-28 RX ORDER — OXYCODONE HYDROCHLORIDE 5 MG/1
10 TABLET ORAL EVERY 4 HOURS PRN
Status: DISCONTINUED | OUTPATIENT
Start: 2023-08-28 | End: 2023-08-31 | Stop reason: HOSPADM

## 2023-08-28 RX ORDER — FENTANYL CITRATE 50 UG/ML
INJECTION, SOLUTION INTRAMUSCULAR; INTRAVENOUS PRN
Status: DISCONTINUED | OUTPATIENT
Start: 2023-08-28 | End: 2023-08-28 | Stop reason: SDUPTHER

## 2023-08-28 RX ORDER — SPIRONOLACTONE 25 MG/1
25 TABLET ORAL DAILY
Status: DISCONTINUED | OUTPATIENT
Start: 2023-08-29 | End: 2023-08-31

## 2023-08-28 RX ORDER — BUPIVACAINE HYDROCHLORIDE 2.5 MG/ML
INJECTION, SOLUTION EPIDURAL; INFILTRATION; INTRACAUDAL
Status: COMPLETED | OUTPATIENT
Start: 2023-08-28 | End: 2023-08-28

## 2023-08-28 RX ORDER — ATROPINE SULFATE 0.4 MG/ML
INJECTION, SOLUTION INTRAMUSCULAR; INTRAVENOUS; SUBCUTANEOUS PRN
Status: DISCONTINUED | OUTPATIENT
Start: 2023-08-28 | End: 2023-08-28 | Stop reason: SDUPTHER

## 2023-08-28 RX ORDER — FAMOTIDINE 20 MG/1
20 TABLET, FILM COATED ORAL 2 TIMES DAILY
Status: COMPLETED | OUTPATIENT
Start: 2023-08-28 | End: 2023-08-28

## 2023-08-28 RX ORDER — SENNA AND DOCUSATE SODIUM 50; 8.6 MG/1; MG/1
1 TABLET, FILM COATED ORAL 2 TIMES DAILY
Status: DISCONTINUED | OUTPATIENT
Start: 2023-08-28 | End: 2023-08-31 | Stop reason: HOSPADM

## 2023-08-28 RX ORDER — SODIUM CHLORIDE 450 MG/100ML
INJECTION, SOLUTION INTRAVENOUS CONTINUOUS
Status: DISCONTINUED | OUTPATIENT
Start: 2023-08-28 | End: 2023-08-30

## 2023-08-28 RX ORDER — METHYLPREDNISOLONE SODIUM SUCCINATE 125 MG/2ML
125 INJECTION, POWDER, LYOPHILIZED, FOR SOLUTION INTRAMUSCULAR; INTRAVENOUS EVERY 12 HOURS
Status: DISCONTINUED | OUTPATIENT
Start: 2023-08-28 | End: 2023-08-28 | Stop reason: HOSPADM

## 2023-08-28 RX ADMIN — PHENYLEPHRINE HYDROCHLORIDE 20 MCG/MIN: 10 INJECTION INTRAVENOUS at 11:26

## 2023-08-28 RX ADMIN — FAMOTIDINE 20 MG: 20 TABLET, FILM COATED ORAL at 19:58

## 2023-08-28 RX ADMIN — MUPIROCIN: 20 OINTMENT TOPICAL at 20:10

## 2023-08-28 RX ADMIN — BUPIVACAINE HYDROCHLORIDE 40 ML: 2.5 INJECTION, SOLUTION EPIDURAL; INFILTRATION; INTRACAUDAL; PERINEURAL at 11:56

## 2023-08-28 RX ADMIN — ROCURONIUM BROMIDE 10 MG: 10 INJECTION INTRAVENOUS at 11:57

## 2023-08-28 RX ADMIN — FENTANYL CITRATE 50 MCG: 50 INJECTION INTRAMUSCULAR; INTRAVENOUS at 11:46

## 2023-08-28 RX ADMIN — PHENYLEPHRINE HYDROCHLORIDE 80 MCG: 10 INJECTION INTRAVENOUS at 08:35

## 2023-08-28 RX ADMIN — PROPOFOL 160 MG: 10 INJECTION, EMULSION INTRAVENOUS at 08:08

## 2023-08-28 RX ADMIN — WATER 2000 MG: 1 INJECTION INTRAMUSCULAR; INTRAVENOUS; SUBCUTANEOUS at 22:26

## 2023-08-28 RX ADMIN — FENTANYL CITRATE 50 MCG: 50 INJECTION INTRAMUSCULAR; INTRAVENOUS at 08:08

## 2023-08-28 RX ADMIN — ROCURONIUM BROMIDE 10 MG: 10 INJECTION INTRAVENOUS at 09:03

## 2023-08-28 RX ADMIN — ROCURONIUM BROMIDE 5 MG: 10 INJECTION INTRAVENOUS at 08:08

## 2023-08-28 RX ADMIN — FENTANYL CITRATE 100 MCG: 50 INJECTION INTRAMUSCULAR; INTRAVENOUS at 09:47

## 2023-08-28 RX ADMIN — Medication 10 ML: at 14:22

## 2023-08-28 RX ADMIN — LABETALOL HYDROCHLORIDE 5 MG: 5 INJECTION, SOLUTION INTRAVENOUS at 13:21

## 2023-08-28 RX ADMIN — MUPIROCIN: 20 OINTMENT TOPICAL at 15:51

## 2023-08-28 RX ADMIN — ONDANSETRON HYDROCHLORIDE 4 MG: 2 INJECTION, SOLUTION INTRAMUSCULAR; INTRAVENOUS at 13:18

## 2023-08-28 RX ADMIN — FENTANYL CITRATE 50 MCG: 50 INJECTION INTRAMUSCULAR; INTRAVENOUS at 11:14

## 2023-08-28 RX ADMIN — 0.12% CHLORHEXIDINE GLUCONATE 15 ML: 1.2 RINSE ORAL at 20:10

## 2023-08-28 RX ADMIN — HYDRALAZINE HYDROCHLORIDE 15 MG: 20 INJECTION INTRAMUSCULAR; INTRAVENOUS at 16:27

## 2023-08-28 RX ADMIN — SODIUM CHLORIDE, POTASSIUM CHLORIDE, SODIUM LACTATE AND CALCIUM CHLORIDE: 600; 310; 30; 20 INJECTION, SOLUTION INTRAVENOUS at 06:56

## 2023-08-28 RX ADMIN — GLYCOPYRROLATE 0.4 MG: 0.2 INJECTION, SOLUTION INTRAMUSCULAR; INTRAVENOUS at 13:18

## 2023-08-28 RX ADMIN — ROCURONIUM BROMIDE 30 MG: 10 INJECTION INTRAVENOUS at 11:05

## 2023-08-28 RX ADMIN — PRAVASTATIN SODIUM 40 MG: 40 TABLET ORAL at 17:14

## 2023-08-28 RX ADMIN — FAMOTIDINE 20 MG: 10 INJECTION INTRAVENOUS at 14:24

## 2023-08-28 RX ADMIN — 0.12% CHLORHEXIDINE GLUCONATE 15 ML: 1.2 RINSE ORAL at 15:51

## 2023-08-28 RX ADMIN — ROCURONIUM BROMIDE 20 MG: 10 INJECTION INTRAVENOUS at 10:29

## 2023-08-28 RX ADMIN — HEPARIN SODIUM 5000 UNITS: 1000 INJECTION, SOLUTION INTRAVENOUS; SUBCUTANEOUS at 12:50

## 2023-08-28 RX ADMIN — SENNOSIDES AND DOCUSATE SODIUM 1 TABLET: 50; 8.6 TABLET ORAL at 14:39

## 2023-08-28 RX ADMIN — Medication 160 MG: at 08:09

## 2023-08-28 RX ADMIN — PROTAMINE SULFATE 100 MG: 10 INJECTION, SOLUTION INTRAVENOUS at 13:15

## 2023-08-28 RX ADMIN — ROCURONIUM BROMIDE 30 MG: 10 INJECTION INTRAVENOUS at 09:47

## 2023-08-28 RX ADMIN — APIXABAN 2.5 MG: 2.5 TABLET, FILM COATED ORAL at 22:02

## 2023-08-28 RX ADMIN — OXYCODONE HYDROCHLORIDE 10 MG: 5 TABLET ORAL at 14:25

## 2023-08-28 RX ADMIN — SODIUM CHLORIDE, POTASSIUM CHLORIDE, SODIUM LACTATE AND CALCIUM CHLORIDE: 600; 310; 30; 20 INJECTION, SOLUTION INTRAVENOUS at 13:05

## 2023-08-28 RX ADMIN — HEPARIN SODIUM 15000 UNITS: 1000 INJECTION, SOLUTION INTRAVENOUS; SUBCUTANEOUS at 12:35

## 2023-08-28 RX ADMIN — DEXAMETHASONE SODIUM PHOSPHATE 8 MG: 4 INJECTION INTRA-ARTICULAR; INTRALESIONAL; INTRAMUSCULAR; INTRAVENOUS; SOFT TISSUE at 08:31

## 2023-08-28 RX ADMIN — Medication 10 MG: at 10:46

## 2023-08-28 RX ADMIN — SODIUM CHLORIDE, PRESERVATIVE FREE 10 ML: 5 INJECTION INTRAVENOUS at 20:18

## 2023-08-28 RX ADMIN — Medication 3 MG: at 13:18

## 2023-08-28 RX ADMIN — AMIODARONE HYDROCHLORIDE 200 MG: 200 TABLET ORAL at 14:39

## 2023-08-28 RX ADMIN — METHYLPREDNISOLONE SODIUM SUCCINATE 125 MG: 125 INJECTION, POWDER, FOR SOLUTION INTRAMUSCULAR; INTRAVENOUS at 21:59

## 2023-08-28 RX ADMIN — LABETALOL HYDROCHLORIDE 5 MG: 5 INJECTION, SOLUTION INTRAVENOUS at 13:44

## 2023-08-28 RX ADMIN — HYDRALAZINE HYDROCHLORIDE 10 MG: 20 INJECTION INTRAMUSCULAR; INTRAVENOUS at 14:20

## 2023-08-28 RX ADMIN — PROPOFOL 30 MG: 10 INJECTION, EMULSION INTRAVENOUS at 12:06

## 2023-08-28 RX ADMIN — HYDROMORPHONE HYDROCHLORIDE 0.5 MG: 1 INJECTION, SOLUTION INTRAMUSCULAR; INTRAVENOUS; SUBCUTANEOUS at 14:39

## 2023-08-28 RX ADMIN — Medication 1 UNITS: at 22:27

## 2023-08-28 RX ADMIN — PROPOFOL 20 MG: 10 INJECTION, EMULSION INTRAVENOUS at 12:08

## 2023-08-28 RX ADMIN — WATER 2000 MG: 1 INJECTION INTRAMUSCULAR; INTRAVENOUS; SUBCUTANEOUS at 14:29

## 2023-08-28 RX ADMIN — PHENYLEPHRINE HYDROCHLORIDE 80 MCG: 10 INJECTION INTRAVENOUS at 08:45

## 2023-08-28 RX ADMIN — Medication 2000 MG: at 08:50

## 2023-08-28 RX ADMIN — Medication 3 AMPULE: at 06:56

## 2023-08-28 RX ADMIN — LIDOCAINE HYDROCHLORIDE 100 MG: 20 INJECTION, SOLUTION EPIDURAL; INFILTRATION; INTRACAUDAL; PERINEURAL at 08:08

## 2023-08-28 RX ADMIN — SENNOSIDES AND DOCUSATE SODIUM 1 TABLET: 50; 8.6 TABLET ORAL at 19:58

## 2023-08-28 RX ADMIN — AMIODARONE HYDROCHLORIDE 200 MG: 200 TABLET ORAL at 19:58

## 2023-08-28 RX ADMIN — ROCURONIUM BROMIDE 40 MG: 10 INJECTION INTRAVENOUS at 08:25

## 2023-08-28 RX ADMIN — ATROPINE SULFATE 0.2 MG: 0.4 INJECTION, SOLUTION INTRAMUSCULAR; INTRAVENOUS; SUBCUTANEOUS at 10:47

## 2023-08-28 RX ADMIN — HYDRALAZINE HYDROCHLORIDE 10 MG: 20 INJECTION INTRAMUSCULAR; INTRAVENOUS at 15:05

## 2023-08-28 RX ADMIN — CARVEDILOL 6.25 MG: 3.12 TABLET, FILM COATED ORAL at 17:02

## 2023-08-28 RX ADMIN — HYDRALAZINE HYDROCHLORIDE 15 MG: 20 INJECTION INTRAMUSCULAR; INTRAVENOUS at 19:53

## 2023-08-28 RX ADMIN — PROPOFOL 50 MG: 10 INJECTION, EMULSION INTRAVENOUS at 09:47

## 2023-08-28 RX ADMIN — ACETAMINOPHEN 1000 MG: 500 TABLET ORAL at 14:39

## 2023-08-28 RX ADMIN — PROPOFOL 50 MG: 10 INJECTION, EMULSION INTRAVENOUS at 08:25

## 2023-08-28 RX ADMIN — POLYETHYLENE GLYCOL 3350 17 G: 17 POWDER, FOR SOLUTION ORAL at 15:51

## 2023-08-28 RX ADMIN — HEPARIN SODIUM 7000 UNITS: 1000 INJECTION, SOLUTION INTRAVENOUS; SUBCUTANEOUS at 13:07

## 2023-08-28 ASSESSMENT — PAIN SCALES - GENERAL
PAINLEVEL_OUTOF10: 0
PAINLEVEL_OUTOF10: 8
PAINLEVEL_OUTOF10: 0
PAINLEVEL_OUTOF10: 8
PAINLEVEL_OUTOF10: 0
PAINLEVEL_OUTOF10: 1
PAINLEVEL_OUTOF10: 2
PAINLEVEL_OUTOF10: 0
PAINLEVEL_OUTOF10: 2

## 2023-08-28 ASSESSMENT — PAIN DESCRIPTION - LOCATION
LOCATION: CHEST

## 2023-08-28 ASSESSMENT — PAIN DESCRIPTION - DESCRIPTORS: DESCRIPTORS: SORE

## 2023-08-28 ASSESSMENT — PAIN DESCRIPTION - ORIENTATION
ORIENTATION: LEFT
ORIENTATION: ANTERIOR
ORIENTATION: ANTERIOR

## 2023-08-28 NOTE — PERIOP NOTE
2227: Lab stated PT/INR was hemolyzed.     7658: PT/INR re-drawn and sent to lab    [phone]: Patients belongings given to patients steff Zelaya

## 2023-08-28 NOTE — CONSULTS
Pulmonary and Critical Care  Consult Note    Requesting Provider: Dr. Abdi Palm    Reason for Consult: Post operative medical management    HPI: The patient is a 73/M who we are seeing in consultation at the request of Dr. Abdi Palm for post operative medical management. Briefly, per notes \" Lois Singh is a 68 y.o. male who was referred for cardiac evaluation for persistent atrial fibrillation by Dr. Rachana Bryant. Pt has a significant PMH of afib, HTN, HLD, cardiomyopathy, carotid atherosclerosis, throat cancer s/p radiation, esophageal stricture s/p esophageal dilation 2013, hypothyroid. Previous ablations 2017. Pt denies SINDHU. Taking xarelto for [facility] and denies h/o GIB. Denies previous surgeries/trauma to chest. MR is noted as trace. Denies being on testosterone or other hormonal replacement therapy. Pt consumes occasional tea caffeinated beverages. \"    Patient went for \"transpericardial hybrid ablation, left atrial appendage clip vial left VATS\". Post operatively, patient is lying on the bed. Review of Systems: All other systems have been reviewed and are negative except per HPI    Past Medical History:  Past Medical History:   Diagnosis Date    Arrhythmia     a fib    Atrial fibrillation ([address])     Cancer ([address])     skin, throat cancer    Congestive heart failure ([address]) 11/2020    hosp at [facility]    G tube feedings ([address])     PEG    High cholesterol     Hypertension     Hypertrophic cardiomyopathy ([address])     per cardiology notes 6/2013    Long term current use of anticoagulant therapy     MI (myocardial infarction) ([address])     per pt, was told he had MI over 10 years    Status post chemoradiation     completed 12/2013    Throat cancer ([address])     Viral meningitis        Social History:   reports that he has never smoked. He quit smokeless tobacco use about 10 years ago. He reports current alcohol use of about 1.0 standard drink per week. He reports that he does not use drugs.     Family History:  Family History Creatinine 1.31 (H) 0.70 - 1.30 MG/DL    Bun/Cre Ratio 20 12 - 20      Est, Glom Filt Rate 57 (L) >60 ml/min/1.73m2    Calcium 8.5 8.5 - 10.1 MG/DL    Total Bilirubin 0.5 0.2 - 1.0 MG/DL    ALT 21 12 - 78 U/L    AST 23 15 - 37 U/L    Alk Phosphatase 47 45 - 117 U/L    Total Protein 6.6 6.4 - 8.2 g/dL    Albumin 3.5 3.5 - 5.0 g/dL    Globulin 3.1 2.0 - 4.0 g/dL    Albumin/Globulin Ratio 1.1 1.1 - 2.2     CBC    Collection Time: 08/28/23  2:11 PM   Result Value Ref Range    WBC 13.4 (H) 4.1 - 11.1 K/uL    RBC 4.32 4.10 - 5.70 M/uL    Hemoglobin 14.3 12.1 - 17.0 g/dL    Hematocrit 43.1 36.6 - 50.3 %    MCV 99.8 (H) 80.0 - 99.0 FL    MCH 33.1 26.0 - 34.0 PG    MCHC 33.2 30.0 - 36.5 g/dL    RDW 13.1 11.5 - 14.5 %    Platelets 660 976 - 189 K/uL    MPV 10.6 8.9 - 12.9 FL    Nucleated RBCs 0.0 0  WBC    nRBC 0.00 0.00 - 0.01 K/uL   Protime-INR    Collection Time: 08/28/23  2:11 PM   Result Value Ref Range    INR 1.1 0.9 - 1.1      Protime 11.3 (H) 9.0 - 11.1 sec   APTT    Collection Time: 08/28/23  2:11 PM   Result Value Ref Range    PTT 30.8 22.1 - 31.0 sec    Therapeutic Range   58.0 - 77.0 SECS   Magnesium    Collection Time: 08/28/23  2:11 PM   Result Value Ref Range    Magnesium 2.3 1.6 - 2.4 mg/dL   EKG 12 lead    Collection Time: 08/28/23  2:13 PM   Result Value Ref Range    Ventricular Rate 79 BPM    Atrial Rate 79 BPM    P-R Interval 196 ms    QRS Duration 136 ms    Q-T Interval 416 ms    QTc Calculation (Bazett) 477 ms    P Axis 58 degrees    R Axis -1 degrees    T Axis -22 degrees    Diagnosis       Normal sinus rhythm  Right bundle branch block  Minimal voltage criteria for LVH, may be normal variant  T wave abnormality, consider lateral ischemia  Abnormal ECG  When compared with ECG of 23-AUG-2023 13:08,  Sinus rhythm has replaced Atrial fibrillation  Right bundle branch block is now present               Imaging:    XR CHEST PORTABLE   Final Result      Probable pulmonary edema. Cardiomegaly.

## 2023-08-28 NOTE — ANESTHESIA PRE PROCEDURE
Department of Anesthesiology  Preprocedure Note       Name:  Javed Alvarez   Age:  68 y.o.  :  1950                                          MRN:  [id number]         Date:  2023      Surgeon: Scott Sherman):  MD Montana Cook MD    Procedure: Procedure(s):  TRANSPERICARDIAL HYBRID ABLATION WITH FLUORO, LEFT ATRIAL APPENDAGE CLIP VIA LEFT VATS (REQ HYBRID)  EGD DILATION BALLOON    Medications prior to admission:   Prior to Admission medications    Medication Sig Start Date End Date Taking?  Authorizing Provider   chlorhexidine (PERIDEX) 0.12 % solution Swish and spit 15 mLs 2 times daily for 2 days 23  JUAN PABLO Bean NP   mupirocin (BACTROBAN) 2 % ointment Apply topically 2 times daily for 2 days Apply to both nostrils twice daily for 2 days 23  JUAN PABLO Bean NP   Radha Root (RADHA PO) Take 3,200 mg by mouth daily    Historical Provider, MD Rob Ortiz PO Take by mouth Daily    Historical Provider, MD   levothyroxine (SYNTHROID) 50 MCG tablet Take 1 tablet by mouth every morning (before breakfast) 23   Denys Yap MD   rivaroxaban (XARELTO) 20 MG TABS tablet Take 1 tablet by mouth daily    Historical Provider, MD   dilTIAZem (DILACOR XR) 240 MG extended release capsule Take 1 capsule by mouth daily    Historical Provider, MD   carvedilol (COREG) 6.25 MG tablet Take by mouth 2 times daily (with meals) 21   Ar Automatic Reconciliation   furosemide (LASIX) 20 MG tablet Take by mouth daily 12/15/21   Ar Automatic Reconciliation   isosorbide mononitrate (IMDUR) 60 MG extended release tablet Take by mouth daily 21   Ar Automatic Reconciliation   losartan (COZAAR) 50 MG tablet 1 tablet daily 3/7/22   Ar Automatic Reconciliation   lovastatin (MEVACOR) 40 MG tablet TAKE 1 TABLET DAILY 22   Ar Automatic Reconciliation   spironolactone (ALDACTONE) 25 MG tablet 1 tablet daily 21   Ar Automatic Reconciliation       Current

## 2023-08-28 NOTE — OP NOTE
Esophagogastroduodenoscopy Procedure Note      Mala Sanford  1950  [id number]    Indication:  Dysphagia with upper esophageal web/radiation stricture       Endoscopist: Lyssa Aleman MD    Referring Provider:  Girish Spears MD; Dr. Rogerio Myers: Cardiac Surgery    Sedation: General anesthesia    Procedure Details:  After infomed consent was obtained for the procedure, with all risks and benefits of procedure explained the patient was taken to the endoscopy suite and placed in the left lateral decubitus position. Following sequential administration of sedation as per above, the endoscope was inserted into the mouth and advanced under direct vision to second portion of the duodenum. A careful inspection was made as the gastroscope was withdrawn, including a retroflexed view of the proximal stomach; findings and interventions are described below. Findings:   Procedure was performed with general anesthesia. Dr. Elise Barnes from anesthesia was present during EGD. Standard upper endoscope was able to traverse the UES where a focal concentric web like narrowing was seen secondary to radiation but scope able to traverse without dilation. The esophageal lumen seen was mildly dilated but no focal rings seen  GEJ located at 45 cm from incisors. We saw normal stomach mucosa and normal duodenum to second portion. We did not dilate esophagus due to risk for trauma and need to advance JOHN probe by anesthesia. Scope then removed and procedure terminated. Therapies:  none    Specimen: none            Complications:   None were encountered during the procedure. EBL:  None.           Recommendations:   -proceed with planned cardiac surgery    Lyssa Aleman MD  8/28/2023  8:34 AM

## 2023-08-28 NOTE — ANESTHESIA PROCEDURE NOTES
Peripheral Block    Patient location during procedure: OR  Reason for block: post-op pain management and at surgeon's request  Start time: 8/28/2023 11:56 AM  End time: 8/28/2023 12:01 PM  Staffing  Performed: anesthesiologist   Preanesthetic Checklist  Completed: patient identified, IV checked, site marked, risks and benefits discussed, surgical/procedural consents, equipment checked, pre-op evaluation, timeout performed, anesthesia consent given, oxygen available, monitors applied/VS acknowledged, fire risk safety assessment completed and verbalized and blood product R/B/A discussed and consented  Peripheral Block   Patient position: right lateral decubitus  Prep: ChloraPrep  Provider prep: mask and sterile gloves  Patient monitoring: cardiac monitor, continuous pulse ox, frequent blood pressure checks, IV access and oxygen  Block type: Erector spinae  Laterality: left  Injection technique: single-shot  Guidance: ultrasound guided    Needle   Needle type: insulated echogenic nerve stimulator needle   Needle localization: ultrasound guidance  Needle length: 10 cm  Assessment   Injection assessment: negative aspiration for heme, no paresthesia on injection, local visualized surrounding nerve on ultrasound and no intravascular symptoms  Paresthesia pain: none  Slow fractionated injection: yes  Hemodynamics: stable  Real-time US image taken/store: yes  Outcomes: uncomplicated    Additional Notes  2 injections- 1st at T5, 2nd at T10, 20 ml 0.25% bupivacaine at each site for 40ml total  Medications Administered  bupivacaine (MARCAINE) PF injection 0.25% - Perineural   40 mL - 8/28/2023 11:56:00 AM

## 2023-08-28 NOTE — ANESTHESIA PROCEDURE NOTES
Procedure Performed: JOHN       Start Time:  8/28/2023 8:25 AM       End Time:   8/28/2023 10:14 AM    Preanesthesia Checklist:  Patient identified, IV assessed, risks and benefits discussed, monitors and equipment assessed, procedure being performed at surgeon's request and anesthesia consent obtained. General Procedure Information  Diagnostic Indications for Echo:  assessment of surgical repair  Location performed:  OR  Intubated  Bite block placed  Heart visualized  Probe Insertion:  Easy  Probe Type:  Mulitplane  Modalities:  2D, color flow mapping and pulse wave Doppler    Echocardiographic and Doppler Measurements    Ventricles    Right Ventricle:  Cavity size normal.  Hypertrophy not present. Thrombus not present. Left Ventricle:  Cavity size normal.  Hypertrophy present. Thrombus not present. Global Function normal.  Ejection Fraction 55%. Ventricular Regional Function:  1- Basal Anteroseptal:  normal  2- Basal Anterior:  normal  3- Basal Anterolateral:  normal  4- Basal Inferolateral:  normal  5- Basal Inferior:  normal  6- Basal Inferoseptal:  normal  7- Mid Anteroseptal:  normal  8- Mid Anterior:  normal  9- Mid Anterolateral:  normal  10- Mid Inferolateral:  normal  11- Mid Inferior:  normal  12- Mid Inferoseptal:  normal  13- Apical Anterior:  normal  14- Apical Lateral:  normal  15- Apical Inferior:  normal  16- Apical Septal:  normal  17- [place]:  normal      Valves    Aortic Valve: Annulus normal.  Stenosis not present. Regurgitation mild. Leaflets normal.  Leaflet motions normal.      Mitral Valve: Annulus normal.  Stenosis not present. Regurgitation mild. Leaflets normal.  Leaflet motions normal.      Tricuspid Valve: Annulus normal.  Stenosis not present. Regurgitation mild. Leaflets normal.  Leaflet motions normal.    Pulmonic Valve: Annulus normal.  Stenosis not present. Regurgitation none. Aorta    Ascending Aorta:  Size normal.  Dissection not present.     Aortic

## 2023-08-28 NOTE — PERIOP NOTE
1141:  21 burn cycles    1148:  TRANSFER - OUT REPORT:    Verbal report given to [address] on Lois Singh  being transferred to CVICU for routine post-op       Report consisted of patient's Situation, Background, Assessment and   Recommendations(SBAR). Information from the following report(s) Intake/Output, MAR, and Recent Results was reviewed with the receiving nurse. Lines:   CVC Triple Lumen 08/28/23 (Active)       Peripheral IV 08/28/23 Right Forearm (Active)   Site Assessment Clean, dry & intact 08/28/23 0612   Line Status Blood return noted 08/28/23 0612   Line Care Connections checked and tightened 08/28/23 0612   Phlebitis Assessment No symptoms 08/28/23 0612   Infiltration Assessment 0 08/28/23 0612   Dressing Status New dressing applied 08/28/23 0612   Dressing Intervention New 08/28/23 0612       Arterial Line 08/28/23 Radial (Active)       Introducer 08/28/23 (Active)        Opportunity for questions and clarification was provided.       Patient transported with:  Monitor, O2 @ 10lpm, Registered Nurse, and Tech  CRNA

## 2023-08-28 NOTE — ANESTHESIA PROCEDURE NOTES
Central Venous Line:    A central venous line was placed using ultrasound guidance, in the OR for the following indication(s): central venous access and CVP monitoring. 8/28/2023 8:30 AM8/28/2023 8:40 AM    Sterility preparation included the following: hand hygiene performed prior to procedure, maximum sterile barriers used and sterile technique used to drape from head to toe. The patient was placed in Trendelenburg position. The right internal jugular vein was prepped. The site was prepped with Chloraprep. A 7 Fr (size), 16 (length), introducer triple lumen was placed. During the procedure, the following specific steps were taken: target vein identified, needle advanced into vein and blood aspirated and guidewire advanced into vein. Intravenous verification was obtained by ultrasound and venous blood return. Post insertion care included: all ports aspirated, all ports flushed easily, guidewire removed intact, line sutured in place and dressing applied. During the procedure the patient experienced: patient tolerated procedure well with no complications.       Outcomes: uncomplicated  Real-time US image taken/store: yes  Anesthesia type: local..No  Staffing  Performed: Anesthesiologist   Preanesthetic Checklist  Completed: patient identified, IV checked, site marked, risks and benefits discussed, surgical/procedural consents, equipment checked, pre-op evaluation, timeout performed, anesthesia consent given, oxygen available, monitors applied/VS acknowledged, fire risk safety assessment completed and verbalized and blood product R/B/A discussed and consented

## 2023-08-28 NOTE — BRIEF OP NOTE
BRIEF OP NOTE  Pre-Op Diagnosis: Atrial fibrillation, unspecified type ([address]) [I48.91]    Post-Op Diagnosis: AF    Procedure:   TRANSPERICARDIAL HYBRID ABLATION  LEFT ATRIAL APPENDAGE CLIP VIA LEFT VATS  ESOPHAGOGASTRODUDENOSCOPY    Surgeon: Nae Singh MD    Assistant(s): Debora Diehl PA-C    Anesthesia: General     Infusions: Precedex    Estimated Blood Loss:  100ml    Drains: 2 aidan drains    Complications: None    Findings: AF    Implants:   Implant Name Type Inv.  Item Serial No.  Lot No. LRB No. Used Action   DEVICE OCCL CLP L50MM PRELD FOR THORACOSCOPIC PROC GILLINOV - SNA  DEVICE OCCL CLP L50MM PRELD FOR THORACOSCOPIC PROC GILLINOV NA ATRICURE [facility] [id number] N/A 1 Implanted

## 2023-08-28 NOTE — H&P
Update History & Physical    The patient's History and Physical of August 28, 2023 was reviewed with the patient and I examined the patient. There was no change. Plan: The risks, benefits, expected outcome, and alternative to the recommended procedure have been discussed with the patient. Patient understands and wants to proceed with the procedure.      Electronically signed by JUAN Blanco on 8/28/2023 at 7:27 AM

## 2023-08-28 NOTE — PROGRESS NOTES
1600 JUAN Phoenix in unit, updated w/ pat BP's. Coreg given early per PA request.     1622 Dr. Radha Crespo rounding on pat. D/C nicardipine and use hydralazine PRN.

## 2023-08-28 NOTE — ANESTHESIA PROCEDURE NOTES
Arterial Line:    An arterial line was placed using ultrasound guidance, in the pre-op for the following indication(s): continuous blood pressure monitoring and blood sampling needed. A 20 gauge (size), 1 and 3/8 inch (length), Arrow (type) catheter was placed, Seldinger technique used, into the right radial artery, secured by tape and Tegaderm. Anesthesia type: Local  Local infiltration: Injection    Events:  patient tolerated procedure well with no complications. 8/28/2023 7:10 AM8/28/2023 7:15 AM  Performed: Anesthesiologist   Preanesthetic Checklist  Completed: patient identified, IV checked, site marked, risks and benefits discussed, surgical/procedural consents, equipment checked, pre-op evaluation, timeout performed, anesthesia consent given, oxygen available and monitors applied/VS acknowledged

## 2023-08-29 ENCOUNTER — APPOINTMENT (OUTPATIENT)
Facility: HOSPITAL | Age: 73
DRG: 273 | End: 2023-08-29
Attending: THORACIC SURGERY (CARDIOTHORACIC VASCULAR SURGERY)
Payer: MEDICARE

## 2023-08-29 LAB
ANION GAP SERPL CALC-SCNC: 6 MMOL/L (ref 5–15)
BUN SERPL-MCNC: 36 MG/DL (ref 6–20)
BUN/CREAT SERPL: 21 (ref 12–20)
CALCIUM SERPL-MCNC: 8.6 MG/DL (ref 8.5–10.1)
CHLORIDE SERPL-SCNC: 106 MMOL/L (ref 97–108)
CO2 SERPL-SCNC: 24 MMOL/L (ref 21–32)
CREAT SERPL-MCNC: 1.71 MG/DL (ref 0.7–1.3)
ERYTHROCYTE [DISTWIDTH] IN BLOOD BY AUTOMATED COUNT: 13.7 % (ref 11.5–14.5)
GLUCOSE BLD STRIP.AUTO-MCNC: 180 MG/DL (ref 65–117)
GLUCOSE BLD STRIP.AUTO-MCNC: 187 MG/DL (ref 65–117)
GLUCOSE BLD STRIP.AUTO-MCNC: 206 MG/DL (ref 65–117)
GLUCOSE SERPL-MCNC: 190 MG/DL (ref 65–100)
HCT VFR BLD AUTO: 45.8 % (ref 36.6–50.3)
HGB BLD-MCNC: 15.3 G/DL (ref 12.1–17)
MAGNESIUM SERPL-MCNC: 2.3 MG/DL (ref 1.6–2.4)
MCH RBC QN AUTO: 33.7 PG (ref 26–34)
MCHC RBC AUTO-ENTMCNC: 33.4 G/DL (ref 30–36.5)
MCV RBC AUTO: 100.9 FL (ref 80–99)
NRBC # BLD: 0 K/UL (ref 0–0.01)
NRBC BLD-RTO: 0 PER 100 WBC
PLATELET # BLD AUTO: 234 K/UL (ref 150–400)
PMV BLD AUTO: 10.9 FL (ref 8.9–12.9)
POTASSIUM SERPL-SCNC: 4.3 MMOL/L (ref 3.5–5.1)
RBC # BLD AUTO: 4.54 M/UL (ref 4.1–5.7)
SERVICE CMNT-IMP: ABNORMAL
SODIUM SERPL-SCNC: 136 MMOL/L (ref 136–145)
WBC # BLD AUTO: 13.9 K/UL (ref 4.1–11.1)

## 2023-08-29 PROCEDURE — 82962 GLUCOSE BLOOD TEST: CPT

## 2023-08-29 PROCEDURE — 71045 X-RAY EXAM CHEST 1 VIEW: CPT

## 2023-08-29 PROCEDURE — 6370000000 HC RX 637 (ALT 250 FOR IP): Performed by: PHYSICIAN ASSISTANT

## 2023-08-29 PROCEDURE — 83735 ASSAY OF MAGNESIUM: CPT

## 2023-08-29 PROCEDURE — 6360000002 HC RX W HCPCS: Performed by: NURSE PRACTITIONER

## 2023-08-29 PROCEDURE — 2700000000 HC OXYGEN THERAPY PER DAY

## 2023-08-29 PROCEDURE — 2060000000 HC ICU INTERMEDIATE R&B

## 2023-08-29 PROCEDURE — 6370000000 HC RX 637 (ALT 250 FOR IP): Performed by: INTERNAL MEDICINE

## 2023-08-29 PROCEDURE — 36415 COLL VENOUS BLD VENIPUNCTURE: CPT

## 2023-08-29 PROCEDURE — 6370000000 HC RX 637 (ALT 250 FOR IP): Performed by: NURSE PRACTITIONER

## 2023-08-29 PROCEDURE — 85027 COMPLETE CBC AUTOMATED: CPT

## 2023-08-29 PROCEDURE — 6360000002 HC RX W HCPCS: Performed by: PHYSICIAN ASSISTANT

## 2023-08-29 PROCEDURE — 6360000002 HC RX W HCPCS: Performed by: THORACIC SURGERY (CARDIOTHORACIC VASCULAR SURGERY)

## 2023-08-29 PROCEDURE — 80048 BASIC METABOLIC PNL TOTAL CA: CPT

## 2023-08-29 PROCEDURE — 2580000003 HC RX 258: Performed by: PHYSICIAN ASSISTANT

## 2023-08-29 RX ORDER — ISOSORBIDE MONONITRATE 30 MG/1
60 TABLET, EXTENDED RELEASE ORAL DAILY
Status: DISCONTINUED | OUTPATIENT
Start: 2023-08-29 | End: 2023-08-31 | Stop reason: HOSPADM

## 2023-08-29 RX ORDER — HYDRALAZINE HYDROCHLORIDE 20 MG/ML
15 INJECTION INTRAMUSCULAR; INTRAVENOUS 2 TIMES DAILY PRN
Status: CANCELLED | OUTPATIENT
Start: 2023-08-29

## 2023-08-29 RX ORDER — BISACODYL 10 MG
10 SUPPOSITORY, RECTAL RECTAL DAILY PRN
Status: DISCONTINUED | OUTPATIENT
Start: 2023-08-29 | End: 2023-08-31 | Stop reason: HOSPADM

## 2023-08-29 RX ADMIN — SENNOSIDES AND DOCUSATE SODIUM 1 TABLET: 50; 8.6 TABLET ORAL at 08:25

## 2023-08-29 RX ADMIN — OXYCODONE HYDROCHLORIDE 10 MG: 5 TABLET ORAL at 02:34

## 2023-08-29 RX ADMIN — SODIUM CHLORIDE, PRESERVATIVE FREE 10 ML: 5 INJECTION INTRAVENOUS at 21:12

## 2023-08-29 RX ADMIN — ISOSORBIDE MONONITRATE 60 MG: 30 TABLET, EXTENDED RELEASE ORAL at 08:35

## 2023-08-29 RX ADMIN — SPIRONOLACTONE 25 MG: 25 TABLET ORAL at 08:24

## 2023-08-29 RX ADMIN — WATER 2000 MG: 1 INJECTION INTRAMUSCULAR; INTRAVENOUS; SUBCUTANEOUS at 21:09

## 2023-08-29 RX ADMIN — Medication 2 UNITS: at 14:00

## 2023-08-29 RX ADMIN — 0.12% CHLORHEXIDINE GLUCONATE 15 ML: 1.2 RINSE ORAL at 08:25

## 2023-08-29 RX ADMIN — Medication 4 UNITS: at 17:40

## 2023-08-29 RX ADMIN — AMIODARONE HYDROCHLORIDE 200 MG: 200 TABLET ORAL at 08:25

## 2023-08-29 RX ADMIN — 0.12% CHLORHEXIDINE GLUCONATE 15 ML: 1.2 RINSE ORAL at 21:02

## 2023-08-29 RX ADMIN — SENNOSIDES AND DOCUSATE SODIUM 1 TABLET: 50; 8.6 TABLET ORAL at 21:02

## 2023-08-29 RX ADMIN — WATER 2000 MG: 1 INJECTION INTRAMUSCULAR; INTRAVENOUS; SUBCUTANEOUS at 06:32

## 2023-08-29 RX ADMIN — Medication 2 UNITS: at 08:24

## 2023-08-29 RX ADMIN — CARVEDILOL 6.25 MG: 3.12 TABLET, FILM COATED ORAL at 16:13

## 2023-08-29 RX ADMIN — CARVEDILOL 6.25 MG: 3.12 TABLET, FILM COATED ORAL at 06:41

## 2023-08-29 RX ADMIN — LEVOTHYROXINE SODIUM 50 MCG: 25 TABLET ORAL at 08:29

## 2023-08-29 RX ADMIN — Medication 1 UNITS: at 21:02

## 2023-08-29 RX ADMIN — SODIUM CHLORIDE, PRESERVATIVE FREE 10 ML: 5 INJECTION INTRAVENOUS at 08:27

## 2023-08-29 RX ADMIN — ACETAMINOPHEN 1000 MG: 500 TABLET ORAL at 08:24

## 2023-08-29 RX ADMIN — WATER 2000 MG: 1 INJECTION INTRAMUSCULAR; INTRAVENOUS; SUBCUTANEOUS at 14:03

## 2023-08-29 RX ADMIN — HYDRALAZINE HYDROCHLORIDE 15 MG: 20 INJECTION INTRAMUSCULAR; INTRAVENOUS at 04:36

## 2023-08-29 RX ADMIN — METHYLPREDNISOLONE SODIUM SUCCINATE 125 MG: 125 INJECTION, POWDER, FOR SOLUTION INTRAMUSCULAR; INTRAVENOUS at 21:02

## 2023-08-29 RX ADMIN — DILTIAZEM HYDROCHLORIDE 240 MG: 240 CAPSULE, EXTENDED RELEASE ORAL at 08:24

## 2023-08-29 RX ADMIN — MUPIROCIN: 20 OINTMENT TOPICAL at 21:02

## 2023-08-29 RX ADMIN — METHYLPREDNISOLONE SODIUM SUCCINATE 125 MG: 125 INJECTION, POWDER, FOR SOLUTION INTRAMUSCULAR; INTRAVENOUS at 08:35

## 2023-08-29 RX ADMIN — PRAVASTATIN SODIUM 40 MG: 40 TABLET ORAL at 08:24

## 2023-08-29 RX ADMIN — HYDRALAZINE HYDROCHLORIDE 15 MG: 20 INJECTION INTRAMUSCULAR; INTRAVENOUS at 00:09

## 2023-08-29 RX ADMIN — MUPIROCIN: 20 OINTMENT TOPICAL at 08:28

## 2023-08-29 RX ADMIN — Medication 400 MG: at 21:02

## 2023-08-29 RX ADMIN — AMIODARONE HYDROCHLORIDE 200 MG: 200 TABLET ORAL at 21:02

## 2023-08-29 ASSESSMENT — PAIN DESCRIPTION - DESCRIPTORS
DESCRIPTORS: ACHING
DESCRIPTORS: ACHING

## 2023-08-29 ASSESSMENT — PAIN SCALES - GENERAL
PAINLEVEL_OUTOF10: 0
PAINLEVEL_OUTOF10: 6
PAINLEVEL_OUTOF10: 2
PAINLEVEL_OUTOF10: 7
PAINLEVEL_OUTOF10: 0

## 2023-08-29 ASSESSMENT — PAIN DESCRIPTION - LOCATION
LOCATION: CHEST

## 2023-08-29 ASSESSMENT — PAIN DESCRIPTION - ORIENTATION
ORIENTATION: ANTERIOR

## 2023-08-29 NOTE — CARDIO/PULMONARY
Chart reviewed: Patient is 68 y.o. male admitted with Atrial fibrillation, unspecified type ([address]) [I48.91]  Atrial fibrillation ([address]) [I48.91]  Dysphagia, unspecified type [R13.10]  A-fib ([address]) [I48.91]    Consult for:  Procedure:   TRANSPERICARDIAL HYBRID ABLATION  LEFT ATRIAL APPENDAGE CLIP VIA LEFT VATS  ESOPHAGOGASTRODUDENOSCOPY    Per CP Rehab manager, does not qualify for CP Rehab.      Emerson Flaherty RN

## 2023-08-29 NOTE — PROGRESS NOTES
[id number]  Patient  > Hydralazine 15 mg IV PRN given > that helped, pain about 5/10, NP updated about Solu-medrol been discontinued > NP reorder it,     2150  NP updated about chest Drain about 40 ml per hour bloody, patient on Eliquis 5 mg BID, ordered to give half dose 2.5 mg of Eliquis for now,     0700  2ed PIV inserted, CVC removed, Art Line kept for BP still at upper side, Hydralazine 15 mg IV PRN given 3 times,   Bedside and Verbal shift change report given to Zac Andujar RN (oncoming nurse) by Liam Melendrez RN (offgoing nurse).  Report included the following information SBAR, Kardex, Intake/Output, MAR, Accordion, Recent Results, Med Rec Status and Cardiac Rhythm SR.

## 2023-08-29 NOTE — CARE COORDINATION
Care Management Initial Assessment       RUR: 11% \"low risk\"  Readmission? No  1st IM letter given? Yes   1st  letter given: No     Initial note: Chart reviewed for updates. CM met with pt at bedside, introduced role, confirmed demographics were up-to-date and discussed d/c planning. Pt lives alone in a 2 level home with 3 steps before getting into the front door and 13 steps going up the stairs. Pt is independent with ADL's at home and dos not use any DME. Pt denied history of IPR/SNF/HH and OP rehab. Pt uses [email or web address] or [facility] on [place], [place]. Pt's friend will provide transport at d/c. CM received a consult for [facility]. CM has sent referral to [facility] agencies pending acceptance. CM will continue to follow up with d/c planning. Complete assessment is below:     08/29/23 4175   Service Assessment   Patient Orientation Alert and Oriented   Cognition Alert   History Provided By Patient   Primary Caregiver Self   Accompanied By/Relationship N/A   Support Systems Children;Family Members   Patient's Healthcare Decision Maker is: Patient Declined (Legal Next of Kin Remains as Decision Maker)   PCP Verified by CM Yes   Last Visit to PCP Within last 6 months   Prior Functional Level Independent in ADLs/IADLs   Current Functional Level Independent in ADLs/IADLs   Can patient return to prior living arrangement Yes   Ability to make needs known: Good   Family able to assist with home care needs: Yes   Would you like for me to discuss the discharge plan with any other family members/significant others, and if so, who? Yes   Financial Resources Medicare; Other (Comment)  (BCBS)   Community Resources None   Social/Functional History   Lives With Alone   Type of [address] [place] [address] Dr One level   [address]    Ambulation Assistance Independent   Transfer Assistance Independent   Active  Yes   Mode of Transportation Car   Discharge Planning   Type of Residence House   Living Arrangements Alone   Patient expects to be discharged to: [facility]    [phone]

## 2023-08-29 NOTE — PROGRESS NOTES
1900: Bedside shift change report received from Jose De La Fuente RN (offgoing nurse). Report included the following information Nurse Handoff Report, Surgery Report, Intake/Output, MAR, and Recent Results. 2000: Shift assessment completed, see flowsheets    2153: Pt ambulated to bathroom with stand by assist, voided in toilet with no difficulty. CHG bath completed. 0000: Reassessment completed, see flowsheets    0400: Reassessment completed, see flowsheets    0645: Pt ambulated full length of hallway x3 on monitor w/ nursing on standby. No complaints of pain or SOB post ambulation. Pt returned to recliner. 0700: Bedside shift change report given to HERLINDA Odom (oncoming nurse) by RAVEN Armstrong (offgoing nurse). Report included the following information Nurse Handoff Report, Index, Surgery Report, Intake/Output, MAR, Recent Results, and Cardiac Rhythm NSR w/ PVCs .

## 2023-08-29 NOTE — PROCEDURES
Left chest tube removed without incident. Site covered with Xeroform and gauze. Taped. Mediastinal drain removed without incident. Site covered with Xeroform and gauze. Taped. Both tubes intact.

## 2023-08-30 ENCOUNTER — APPOINTMENT (OUTPATIENT)
Facility: HOSPITAL | Age: 73
DRG: 273 | End: 2023-08-30
Attending: THORACIC SURGERY (CARDIOTHORACIC VASCULAR SURGERY)
Payer: MEDICARE

## 2023-08-30 ENCOUNTER — TELEPHONE (OUTPATIENT)
Age: 73
End: 2023-08-30

## 2023-08-30 LAB
ANION GAP SERPL CALC-SCNC: 4 MMOL/L (ref 5–15)
ANION GAP SERPL CALC-SCNC: 6 MMOL/L (ref 5–15)
BASOPHILS # BLD: 0 K/UL (ref 0–0.1)
BASOPHILS NFR BLD: 0 % (ref 0–1)
BUN SERPL-MCNC: 58 MG/DL (ref 6–20)
BUN SERPL-MCNC: 60 MG/DL (ref 6–20)
BUN/CREAT SERPL: 25 (ref 12–20)
BUN/CREAT SERPL: 29 (ref 12–20)
CALCIUM SERPL-MCNC: 8.6 MG/DL (ref 8.5–10.1)
CALCIUM SERPL-MCNC: 8.7 MG/DL (ref 8.5–10.1)
CHLORIDE SERPL-SCNC: 102 MMOL/L (ref 97–108)
CHLORIDE SERPL-SCNC: 104 MMOL/L (ref 97–108)
CO2 SERPL-SCNC: 25 MMOL/L (ref 21–32)
CO2 SERPL-SCNC: 26 MMOL/L (ref 21–32)
CREAT SERPL-MCNC: 2.07 MG/DL (ref 0.7–1.3)
CREAT SERPL-MCNC: 2.29 MG/DL (ref 0.7–1.3)
DIFFERENTIAL METHOD BLD: ABNORMAL
EOSINOPHIL # BLD: 0 K/UL (ref 0–0.4)
EOSINOPHIL NFR BLD: 0 % (ref 0–7)
ERYTHROCYTE [DISTWIDTH] IN BLOOD BY AUTOMATED COUNT: 13.4 % (ref 11.5–14.5)
GLUCOSE BLD STRIP.AUTO-MCNC: 168 MG/DL (ref 65–117)
GLUCOSE BLD STRIP.AUTO-MCNC: 179 MG/DL (ref 65–117)
GLUCOSE BLD STRIP.AUTO-MCNC: 181 MG/DL (ref 65–117)
GLUCOSE BLD STRIP.AUTO-MCNC: 187 MG/DL (ref 65–117)
GLUCOSE SERPL-MCNC: 189 MG/DL (ref 65–100)
GLUCOSE SERPL-MCNC: 229 MG/DL (ref 65–100)
HCT VFR BLD AUTO: 41.5 % (ref 36.6–50.3)
HGB BLD-MCNC: 13.8 G/DL (ref 12.1–17)
IMM GRANULOCYTES # BLD AUTO: 0.2 K/UL (ref 0–0.04)
IMM GRANULOCYTES NFR BLD AUTO: 1 % (ref 0–0.5)
LYMPHOCYTES # BLD: 0.5 K/UL (ref 0.8–3.5)
LYMPHOCYTES NFR BLD: 3 % (ref 12–49)
MAGNESIUM SERPL-MCNC: 2.5 MG/DL (ref 1.6–2.4)
MCH RBC QN AUTO: 33.4 PG (ref 26–34)
MCHC RBC AUTO-ENTMCNC: 33.3 G/DL (ref 30–36.5)
MCV RBC AUTO: 100.5 FL (ref 80–99)
MONOCYTES # BLD: 1.2 K/UL (ref 0–1)
MONOCYTES NFR BLD: 7 % (ref 5–13)
NEUTS SEG # BLD: 15.6 K/UL (ref 1.8–8)
NEUTS SEG NFR BLD: 89 % (ref 32–75)
NRBC # BLD: 0 K/UL (ref 0–0.01)
NRBC BLD-RTO: 0 PER 100 WBC
PLATELET # BLD AUTO: 216 K/UL (ref 150–400)
PMV BLD AUTO: 10.6 FL (ref 8.9–12.9)
POTASSIUM SERPL-SCNC: 4 MMOL/L (ref 3.5–5.1)
POTASSIUM SERPL-SCNC: 4.4 MMOL/L (ref 3.5–5.1)
POTASSIUM SERPL-SCNC: 4.5 MMOL/L (ref 3.5–5.1)
RBC # BLD AUTO: 4.13 M/UL (ref 4.1–5.7)
RBC MORPH BLD: ABNORMAL
SERVICE CMNT-IMP: ABNORMAL
SODIUM SERPL-SCNC: 133 MMOL/L (ref 136–145)
SODIUM SERPL-SCNC: 134 MMOL/L (ref 136–145)
WBC # BLD AUTO: 17.5 K/UL (ref 4.1–11.1)

## 2023-08-30 PROCEDURE — 2700000000 HC OXYGEN THERAPY PER DAY

## 2023-08-30 PROCEDURE — 6370000000 HC RX 637 (ALT 250 FOR IP): Performed by: INTERNAL MEDICINE

## 2023-08-30 PROCEDURE — 6370000000 HC RX 637 (ALT 250 FOR IP): Performed by: PHYSICIAN ASSISTANT

## 2023-08-30 PROCEDURE — 36415 COLL VENOUS BLD VENIPUNCTURE: CPT

## 2023-08-30 PROCEDURE — 2060000000 HC ICU INTERMEDIATE R&B

## 2023-08-30 PROCEDURE — 2580000003 HC RX 258: Performed by: PHYSICIAN ASSISTANT

## 2023-08-30 PROCEDURE — 83735 ASSAY OF MAGNESIUM: CPT

## 2023-08-30 PROCEDURE — 82540 ASSAY OF CREATINE: CPT

## 2023-08-30 PROCEDURE — 82962 GLUCOSE BLOOD TEST: CPT

## 2023-08-30 PROCEDURE — 2580000003 HC RX 258: Performed by: THORACIC SURGERY (CARDIOTHORACIC VASCULAR SURGERY)

## 2023-08-30 PROCEDURE — 6360000002 HC RX W HCPCS: Performed by: NURSE PRACTITIONER

## 2023-08-30 PROCEDURE — 80048 BASIC METABOLIC PNL TOTAL CA: CPT

## 2023-08-30 PROCEDURE — 2580000003 HC RX 258: Performed by: NURSE PRACTITIONER

## 2023-08-30 PROCEDURE — 85025 COMPLETE CBC W/AUTO DIFF WBC: CPT

## 2023-08-30 PROCEDURE — 84132 ASSAY OF SERUM POTASSIUM: CPT

## 2023-08-30 PROCEDURE — 71045 X-RAY EXAM CHEST 1 VIEW: CPT

## 2023-08-30 PROCEDURE — 6370000000 HC RX 637 (ALT 250 FOR IP): Performed by: NURSE PRACTITIONER

## 2023-08-30 RX ORDER — 0.9 % SODIUM CHLORIDE 0.9 %
500 INTRAVENOUS SOLUTION INTRAVENOUS ONCE
Status: COMPLETED | OUTPATIENT
Start: 2023-08-30 | End: 2023-08-30

## 2023-08-30 RX ORDER — SODIUM CHLORIDE 9 MG/ML
INJECTION, SOLUTION INTRAVENOUS CONTINUOUS
Status: DISCONTINUED | OUTPATIENT
Start: 2023-08-30 | End: 2023-08-31

## 2023-08-30 RX ADMIN — POLYETHYLENE GLYCOL 3350 17 G: 17 POWDER, FOR SOLUTION ORAL at 08:11

## 2023-08-30 RX ADMIN — AMIODARONE HYDROCHLORIDE 200 MG: 200 TABLET ORAL at 20:57

## 2023-08-30 RX ADMIN — MUPIROCIN: 20 OINTMENT TOPICAL at 21:18

## 2023-08-30 RX ADMIN — DILTIAZEM HYDROCHLORIDE 240 MG: 240 CAPSULE, EXTENDED RELEASE ORAL at 08:14

## 2023-08-30 RX ADMIN — SODIUM CHLORIDE: 9 INJECTION, SOLUTION INTRAVENOUS at 16:40

## 2023-08-30 RX ADMIN — CARVEDILOL 6.25 MG: 3.12 TABLET, FILM COATED ORAL at 17:14

## 2023-08-30 RX ADMIN — SODIUM CHLORIDE, PRESERVATIVE FREE 10 ML: 5 INJECTION INTRAVENOUS at 20:58

## 2023-08-30 RX ADMIN — Medication 2 UNITS: at 08:19

## 2023-08-30 RX ADMIN — AMIODARONE HYDROCHLORIDE 200 MG: 200 TABLET ORAL at 08:11

## 2023-08-30 RX ADMIN — CARVEDILOL 6.25 MG: 3.12 TABLET, FILM COATED ORAL at 08:11

## 2023-08-30 RX ADMIN — Medication 400 MG: at 08:22

## 2023-08-30 RX ADMIN — SENNOSIDES AND DOCUSATE SODIUM 1 TABLET: 50; 8.6 TABLET ORAL at 20:58

## 2023-08-30 RX ADMIN — Medication 2 UNITS: at 13:03

## 2023-08-30 RX ADMIN — Medication 400 MG: at 20:58

## 2023-08-30 RX ADMIN — SODIUM CHLORIDE 500 ML: 9 INJECTION, SOLUTION INTRAVENOUS at 13:30

## 2023-08-30 RX ADMIN — SODIUM CHLORIDE 500 ML: 9 INJECTION, SOLUTION INTRAVENOUS at 08:41

## 2023-08-30 RX ADMIN — LEVOTHYROXINE SODIUM 50 MCG: 25 TABLET ORAL at 05:18

## 2023-08-30 RX ADMIN — METHYLPREDNISOLONE SODIUM SUCCINATE 125 MG: 125 INJECTION, POWDER, FOR SOLUTION INTRAMUSCULAR; INTRAVENOUS at 08:11

## 2023-08-30 RX ADMIN — METHYLPREDNISOLONE SODIUM SUCCINATE 125 MG: 125 INJECTION, POWDER, FOR SOLUTION INTRAMUSCULAR; INTRAVENOUS at 21:06

## 2023-08-30 RX ADMIN — SODIUM CHLORIDE, PRESERVATIVE FREE 10 ML: 5 INJECTION INTRAVENOUS at 08:20

## 2023-08-30 RX ADMIN — 0.12% CHLORHEXIDINE GLUCONATE 15 ML: 1.2 RINSE ORAL at 08:21

## 2023-08-30 RX ADMIN — PRAVASTATIN SODIUM 40 MG: 40 TABLET ORAL at 08:14

## 2023-08-30 RX ADMIN — RIVAROXABAN 20 MG: 20 TABLET, FILM COATED ORAL at 08:13

## 2023-08-30 RX ADMIN — MUPIROCIN: 20 OINTMENT TOPICAL at 08:21

## 2023-08-30 RX ADMIN — Medication 1 UNITS: at 21:22

## 2023-08-30 RX ADMIN — SENNOSIDES AND DOCUSATE SODIUM 1 TABLET: 50; 8.6 TABLET ORAL at 08:11

## 2023-08-30 RX ADMIN — SPIRONOLACTONE 25 MG: 25 TABLET ORAL at 10:06

## 2023-08-30 RX ADMIN — ISOSORBIDE MONONITRATE 60 MG: 30 TABLET, EXTENDED RELEASE ORAL at 08:21

## 2023-08-30 RX ADMIN — 0.12% CHLORHEXIDINE GLUCONATE 15 ML: 1.2 RINSE ORAL at 21:27

## 2023-08-30 RX ADMIN — Medication 2 UNITS: at 17:14

## 2023-08-30 ASSESSMENT — PAIN SCALES - GENERAL
PAINLEVEL_OUTOF10: 0
PAINLEVEL_OUTOF10: 3
PAINLEVEL_OUTOF10: 0

## 2023-08-30 NOTE — CARE COORDINATION
/Transition of Care Plan:    RUR: 13%  Prior Level of Functioning:   Disposition: Home with [facility]    If SNF or IPR: Date FOC offered: 8/29/23  Date FOC received: 8/29/23  Accepting facility:   Date authorization started with reference number:   Date authorization received and expires: Follow up appointments: PP, Specialist  DME needed: none  Transportation at discharge: Pt to arrange  IM/IMM Medicare/ letter given: 2nd IM Letter given  Is patient a  and connected with VA? Na/   If yes, was [employer] transfer form completed and VA notified? Caregiver Contact: Carlos Diggs, [phone]  Discharge Caregiver contacted prior to discharge? If pt desires  Care Conference needed? none  Barriers to discharge: none    CM reviewed Care Port. [facility]. CM provided agency with d/c date of today.     [address]  Phone: [phone]

## 2023-08-30 NOTE — PROGRESS NOTES
0700 Bedside and Verbal shift change report given to [address] [place] [address] and Neva Benitez (oncoming nurse) by Shanta Amos (offgoing nurse). Report included the following information Nurse Handoff Report, Index, Adult Overview, Intake/Output, MAR, Recent Results, and Cardiac Rhythm NSR with RBBB . [id number] Patient's last 3 BP at rest are 162/100, 153/100 and 167/99. NS gtt stopped and cardiothoracic surgery paged     1910 Bedside and Verbal shift change report given to Jorge FERRARI (oncoming nurse) by [address] [place] [address] and [facility] (offgoing nurse). Report included the following information Nurse Handoff Report, Index, Adult Overview, Intake/Output, MAR, and Recent Results.

## 2023-08-30 NOTE — DISCHARGE INSTRUCTIONS
Cardiac Surgery Specialist    [address] [place] [address] [address] [facility]                                          [facility], [address] [facility] Marques Harvey  Office- [phone]  Fax- [phone]       Office- [phone]  Fax- [phone]  _____________________________________________________________  ATUL Moran Dr., Dr., NP, [place] Inocencio Pale, NP         Name:Young Olivaerin Wilburn Press     A-fib    Discharge Date: 8/30/2023     Discharge to: Home    INSTRUCTIONS:    Wash incision with soap and water daily. No strenuous activity or lifting more than 10 lbs for a week. No driving while taking pain medications. Your follow-up appointment with Dr Mark Lechuga will be on 9/6/23 at 11:00am. Office is located at [facility], [address] [place] [address] One, [address]. Arrive 1 hour prior to appointment to have labs drawn. Lab is located in [address] [place] [address] 2 ([facility] [address]. Please call our office at [phone] if you are unable to make this appointment. Your follow up with Dr. Melyssa Rosario will be on 9/15/23 at 1:15pm. Office number is [phone]. PLEASE bring your medication bottles to each appointment. Please sign up for My Chart. Call the office immediately for any shortness of breath or chest pressure/discomfort. CALL [phone] FOR ANY QUESTIONS OR PROBLEMS.     Signature:___________________________________________________

## 2023-08-30 NOTE — OP NOTE
[facility]  OPERATIVE REPORT    Name:  Carter Humphrey  MR#:  [id number]  :  1950  ACCOUNT #:  [de-identified]  DATE OF SERVICE:  2023    PREOPERATIVE DIAGNOSIS:  Longstanding persistent atrial fibrillation. POSTOPERATIVE DIAGNOSIS:  Longstanding persistent atrial fibrillation. PROCEDURE PERFORMED:  1. Transpericardial epicardial posterior wall ablation. 2.  Left atrial appendage ligation by VATS clip. SURGEON:  Adele Sepulveda MD    ASSISTANT:  Kuldeep Mathias PA-C. ANESTHESIA:  General endotracheal.    COMPLICATIONS:  None. SPECIMENS REMOVED:  None. IMPLANTS:  #45 AtriCure clip. ESTIMATED BLOOD LOSS:  10 mL. ANESTHESIOLOGIST:  Viki Mirza MD    PROCEDURE:  The patient is a 68-year-old gentleman who was referred by Dr. Stephie Swann, who had longstanding persistent atrial fibrillation. We proceed first with left atrial appendage ligation after passing of the JOHN probe with the assistance of a GI Dr. Karen Shafer. Without issue, we opened the pericardium longitudinally after making three port sites and then advanced the size #45 clip over the left atrial appendage, and had no issues with this. We closed the clip and we had excellent results. We then removed the clip and placed single drain in the left chest.  We then closed the port sites in multiple layers. We then reexpand the lung. We then made incision over the xyphoid process and dissected down to the inferior border of the pericardium. We opened the pericardium widely and laterally. We then inserted the trocar, through the trocar we inserted a fiberoptic camera and the ablation probe. We then performed the series of ablation from pulmonary vein to pulmonary vein. We were satisfied with all the ablation. We then left a drain in the posterior pericardial space and closed the wound in multiple layers. The patient was then transported to the EP for the endocardial portion of the procedure.     The PA was critical for all aspects of the surgery including placement of the clip, dissection down to the pericardium, opening the pericardium, performance of each ablation, and closure of the wounds.         MD CYRUS Keating/[id number]  D:  08/29/2023 14:14  T:  08/29/2023 23:55  JOB #:  [id number]

## 2023-08-30 NOTE — TELEPHONE ENCOUNTER
LVM for Savita    Patient last seen 6/5/2023  Hospital discharge summary is not completed.  Can send once it is

## 2023-08-30 NOTE — TELEPHONE ENCOUNTER
[facility] states she is calling you back. Cole Raya states she only needs the Office notes from 6/5/23. Cole Raya states she already has Hospital information. Please call back if any questions.  Thank you

## 2023-08-30 NOTE — PROGRESS NOTES
Repeat BMP from 1100 measured Cr.2.07 (2.29), K+ 4.4 (4). Patient 500 ml NS bolus was still infusing while labs were taken. Patient last urinated at 0800 this morning. Bladder scan revealed 92 ml. Blood pressure 111/79. Repeat 500 ml NS bolus with repeat Cr at 1430. Hx of HF with EF 50-55%. If Cr not improved and/or urine output remains suboptimal, patient will remain overnight for possible nephrology consult in morning.

## 2023-08-30 NOTE — TELEPHONE ENCOUNTER
[facility] stats she needs patient's Most Recent/Last Office Notes Faxed over to them. Patient Only office notes is 6/5/23 as New Patient with Dr. Rickey Gay. Please call if any questions.  Thank you      Ph# & Fax# are the same @ [phone]  ATTN: Cole Raya

## 2023-08-31 ENCOUNTER — APPOINTMENT (OUTPATIENT)
Facility: HOSPITAL | Age: 73
DRG: 273 | End: 2023-08-31
Attending: THORACIC SURGERY (CARDIOTHORACIC VASCULAR SURGERY)
Payer: MEDICARE

## 2023-08-31 VITALS
HEART RATE: 66 BPM | OXYGEN SATURATION: 94 % | BODY MASS INDEX: 31.59 KG/M2 | SYSTOLIC BLOOD PRESSURE: 134 MMHG | HEIGHT: 68 IN | TEMPERATURE: 97.7 F | WEIGHT: 208.44 LBS | RESPIRATION RATE: 19 BRPM | DIASTOLIC BLOOD PRESSURE: 77 MMHG

## 2023-08-31 PROBLEM — N17.9 AKI (ACUTE KIDNEY INJURY) (HCC): Status: RESOLVED | Noted: 2023-08-31 | Resolved: 2023-08-31

## 2023-08-31 LAB
ANION GAP SERPL CALC-SCNC: 5 MMOL/L (ref 5–15)
BASOPHILS # BLD: 0 K/UL (ref 0–0.1)
BASOPHILS NFR BLD: 0 % (ref 0–1)
BUN SERPL-MCNC: 51 MG/DL (ref 6–20)
BUN/CREAT SERPL: 33 (ref 12–20)
CALCIUM SERPL-MCNC: 8.7 MG/DL (ref 8.5–10.1)
CHLORIDE SERPL-SCNC: 108 MMOL/L (ref 97–108)
CO2 SERPL-SCNC: 23 MMOL/L (ref 21–32)
CREAT SERPL-MCNC: 1.54 MG/DL (ref 0.7–1.3)
DIFFERENTIAL METHOD BLD: ABNORMAL
EKG ATRIAL RATE: 79 BPM
EKG DIAGNOSIS: NORMAL
EKG P AXIS: 58 DEGREES
EKG P-R INTERVAL: 196 MS
EKG Q-T INTERVAL: 416 MS
EKG QRS DURATION: 136 MS
EKG QTC CALCULATION (BAZETT): 477 MS
EKG R AXIS: -1 DEGREES
EKG T AXIS: -22 DEGREES
EKG VENTRICULAR RATE: 79 BPM
EOSINOPHIL # BLD: 0 K/UL (ref 0–0.4)
EOSINOPHIL NFR BLD: 0 % (ref 0–7)
ERYTHROCYTE [DISTWIDTH] IN BLOOD BY AUTOMATED COUNT: 13.4 % (ref 11.5–14.5)
GLUCOSE BLD STRIP.AUTO-MCNC: 155 MG/DL (ref 65–117)
GLUCOSE SERPL-MCNC: 157 MG/DL (ref 65–100)
HCT VFR BLD AUTO: 40.4 % (ref 36.6–50.3)
HGB BLD-MCNC: 13.6 G/DL (ref 12.1–17)
IMM GRANULOCYTES # BLD AUTO: 0.2 K/UL (ref 0–0.04)
IMM GRANULOCYTES NFR BLD AUTO: 1 % (ref 0–0.5)
LYMPHOCYTES # BLD: 0.5 K/UL (ref 0.8–3.5)
LYMPHOCYTES NFR BLD: 3 % (ref 12–49)
MAGNESIUM SERPL-MCNC: 2.7 MG/DL (ref 1.6–2.4)
MCH RBC QN AUTO: 33.4 PG (ref 26–34)
MCHC RBC AUTO-ENTMCNC: 33.7 G/DL (ref 30–36.5)
MCV RBC AUTO: 99.3 FL (ref 80–99)
MONOCYTES # BLD: 0.8 K/UL (ref 0–1)
MONOCYTES NFR BLD: 5 % (ref 5–13)
NEUTS SEG # BLD: 13.6 K/UL (ref 1.8–8)
NEUTS SEG NFR BLD: 91 % (ref 32–75)
NRBC # BLD: 0 K/UL (ref 0–0.01)
NRBC BLD-RTO: 0 PER 100 WBC
PLATELET # BLD AUTO: 190 K/UL (ref 150–400)
PMV BLD AUTO: 10.9 FL (ref 8.9–12.9)
POTASSIUM SERPL-SCNC: 4.5 MMOL/L (ref 3.5–5.1)
RBC # BLD AUTO: 4.07 M/UL (ref 4.1–5.7)
RBC MORPH BLD: ABNORMAL
SERVICE CMNT-IMP: ABNORMAL
SODIUM SERPL-SCNC: 136 MMOL/L (ref 136–145)
WBC # BLD AUTO: 15.1 K/UL (ref 4.1–11.1)

## 2023-08-31 PROCEDURE — 83735 ASSAY OF MAGNESIUM: CPT

## 2023-08-31 PROCEDURE — 6370000000 HC RX 637 (ALT 250 FOR IP): Performed by: NURSE PRACTITIONER

## 2023-08-31 PROCEDURE — 2580000003 HC RX 258: Performed by: PHYSICIAN ASSISTANT

## 2023-08-31 PROCEDURE — 82962 GLUCOSE BLOOD TEST: CPT

## 2023-08-31 PROCEDURE — 80048 BASIC METABOLIC PNL TOTAL CA: CPT

## 2023-08-31 PROCEDURE — 71045 X-RAY EXAM CHEST 1 VIEW: CPT

## 2023-08-31 PROCEDURE — 6370000000 HC RX 637 (ALT 250 FOR IP): Performed by: PHYSICIAN ASSISTANT

## 2023-08-31 PROCEDURE — 36415 COLL VENOUS BLD VENIPUNCTURE: CPT

## 2023-08-31 PROCEDURE — 85025 COMPLETE CBC W/AUTO DIFF WBC: CPT

## 2023-08-31 PROCEDURE — 6360000002 HC RX W HCPCS: Performed by: NURSE PRACTITIONER

## 2023-08-31 PROCEDURE — 6370000000 HC RX 637 (ALT 250 FOR IP): Performed by: INTERNAL MEDICINE

## 2023-08-31 RX ORDER — SPIRONOLACTONE 25 MG/1
25 TABLET ORAL DAILY
Status: DISCONTINUED | OUTPATIENT
Start: 2023-08-31 | End: 2023-08-31 | Stop reason: HOSPADM

## 2023-08-31 RX ORDER — AMIODARONE HYDROCHLORIDE 200 MG/1
200 TABLET ORAL 2 TIMES DAILY
Qty: 23 TABLET | Refills: 0 | Status: ON HOLD | OUTPATIENT
Start: 2023-08-31 | End: 2023-09-22 | Stop reason: HOSPADM

## 2023-08-31 RX ORDER — POLYETHYLENE GLYCOL 3350 17 G/17G
17 POWDER, FOR SOLUTION ORAL DAILY PRN
Qty: 3 PACKET | Refills: 0 | Status: SHIPPED | OUTPATIENT
Start: 2023-08-31

## 2023-08-31 RX ORDER — OXYCODONE HYDROCHLORIDE 5 MG/1
5 TABLET ORAL EVERY 6 HOURS PRN
Qty: 12 TABLET | Refills: 0 | Status: SHIPPED | OUTPATIENT
Start: 2023-08-31 | End: 2023-09-03

## 2023-08-31 RX ORDER — SENNA AND DOCUSATE SODIUM 50; 8.6 MG/1; MG/1
1 TABLET, FILM COATED ORAL 2 TIMES DAILY
Qty: 6 TABLET | Refills: 0 | Status: SHIPPED | OUTPATIENT
Start: 2023-08-31 | End: 2023-09-03

## 2023-08-31 RX ORDER — LOSARTAN POTASSIUM 25 MG/1
25 TABLET ORAL DAILY
Status: DISCONTINUED | OUTPATIENT
Start: 2023-08-31 | End: 2023-08-31

## 2023-08-31 RX ORDER — LOSARTAN POTASSIUM 50 MG/1
50 TABLET ORAL DAILY
Status: DISCONTINUED | OUTPATIENT
Start: 2023-08-31 | End: 2023-08-31 | Stop reason: HOSPADM

## 2023-08-31 RX ORDER — FUROSEMIDE 20 MG/1
20 TABLET ORAL DAILY
Status: DISCONTINUED | OUTPATIENT
Start: 2023-08-31 | End: 2023-08-31

## 2023-08-31 RX ORDER — METHYLPREDNISOLONE 4 MG/1
TABLET ORAL
Qty: 21 TABLET | Refills: 0 | Status: SHIPPED | OUTPATIENT
Start: 2023-09-01 | End: 2023-09-05

## 2023-08-31 RX ADMIN — Medication 400 MG: at 09:05

## 2023-08-31 RX ADMIN — AMIODARONE HYDROCHLORIDE 200 MG: 200 TABLET ORAL at 09:04

## 2023-08-31 RX ADMIN — ISOSORBIDE MONONITRATE 60 MG: 30 TABLET, EXTENDED RELEASE ORAL at 09:05

## 2023-08-31 RX ADMIN — PRAVASTATIN SODIUM 40 MG: 40 TABLET ORAL at 09:04

## 2023-08-31 RX ADMIN — CARVEDILOL 6.25 MG: 3.12 TABLET, FILM COATED ORAL at 09:05

## 2023-08-31 RX ADMIN — LOSARTAN POTASSIUM 50 MG: 50 TABLET, FILM COATED ORAL at 09:04

## 2023-08-31 RX ADMIN — RIVAROXABAN 20 MG: 20 TABLET, FILM COATED ORAL at 09:04

## 2023-08-31 RX ADMIN — SPIRONOLACTONE 25 MG: 25 TABLET ORAL at 09:05

## 2023-08-31 RX ADMIN — METHYLPREDNISOLONE SODIUM SUCCINATE 125 MG: 125 INJECTION, POWDER, FOR SOLUTION INTRAMUSCULAR; INTRAVENOUS at 09:05

## 2023-08-31 RX ADMIN — LEVOTHYROXINE SODIUM 50 MCG: 25 TABLET ORAL at 07:46

## 2023-08-31 RX ADMIN — Medication 2 UNITS: at 09:05

## 2023-08-31 RX ADMIN — DILTIAZEM HYDROCHLORIDE 240 MG: 240 CAPSULE, EXTENDED RELEASE ORAL at 09:04

## 2023-08-31 RX ADMIN — SODIUM CHLORIDE, PRESERVATIVE FREE 10 ML: 5 INJECTION INTRAVENOUS at 09:17

## 2023-08-31 RX ADMIN — 0.12% CHLORHEXIDINE GLUCONATE 15 ML: 1.2 RINSE ORAL at 09:05

## 2023-08-31 RX ADMIN — MUPIROCIN: 20 OINTMENT TOPICAL at 09:06

## 2023-08-31 NOTE — PROGRESS NOTES
0700: Bedside shift change report given to Fox Perez (oncoming nurse) by Stefano Spurling RN (offgoing nurse). Report included the following information Nurse Handoff Report. 0830: Patient up to chair. Able to stand and ambulate without requiring any assistance, steady gait. 5066: Patient had loose Bms overnight; refused Miralax and Senokot. Has no pain; refused Lidocaine patch. 0940: Discussed plan for DC today with cardiac Sx NP.    1100: AVS reviewed with patient; allowed time for questions. Removed PIV. Patient transported to exit in wheelchair with friend and belongings; discharged to home, friend driving.

## 2023-08-31 NOTE — PLAN OF CARE
Problem: Chronic Conditions and Co-morbidities  Goal: Patient's chronic conditions and co-morbidity symptoms are monitored and maintained or improved  Outcome: Completed     Problem: Pain  Goal: Verbalizes/displays adequate comfort level or baseline comfort level  Outcome: Completed     Problem: Safety - Adult  Goal: Free from fall injury  Outcome: Completed     Problem: Skin/Tissue Integrity - Adult  Goal: Skin integrity remains intact  Outcome: Completed  Flowsheets (Taken 8/31/2023 0750)  Skin Integrity Remains Intact: Monitor for areas of redness and/or skin breakdown  Goal: Incisions, wounds, or drain sites healing without S/S of infection  Outcome: Completed  Goal: Oral mucous membranes remain intact  Outcome: Completed

## 2023-08-31 NOTE — CARE COORDINATION
No further CM needs identified at this time. Transition of Care Plan:     RUR: 12% \"low risk\"  Prior Level of Functioning:   Disposition: Home with [facility]   If SNF or IPR: Date FOC offered: 8/29/23  Date FOC received: 8/29/23  Follow up appointments: To be arranged by the cardiac team  DME needed: None  Transportation at discharge: Pt's friend will provide transport  IM/IMM Medicare/ letter given: 2nd IM letter given; signed copy on chart  Is patient a [facility] and connected with VA? N/A  Caregiver Contact: Pt's sister Carlos Diggs, [phone]  Discharge Caregiver contacted prior to discharge? If pt desires  Care Conference needed? No  Barriers to discharge: None    Update 1105 am: CM met with pt at bedside to discuss d/c planning. Pt is agreeable with d/c planning. Pt was made aware that he is d/c home with [facility] . Pt notified to expect call from [facility] to discuss [place]. 2nd IM letter given; signed copy in chart. Pt's friend will provide transport at d/c. No additional questions/concerns reported by pt. No further CM needs identified at this time. Initial note: Chart reviewed. Pt's ELVER is today. [facility]. CM [facility] that [facility] is [facility] today and has requested for them to call pt and discuss SOC. CM to meet pt at bedside to discuss d/c planning and give 2nd IM letter. CM will continue to follow up with d/c planning.      08/31/23 825 [address] Discharge   Transition of Care Consult (CM Consult) Pete No   Reason Outside Agency Chosen Out of service area   [facility]   Mode of Transport at Discharge Other (see comment)  (Pt's friend will provide transport at d/c)   Confirm Follow Up Transport Friends  (Pt's friend will provide transport at d/c)   Condition of Participation: Discharge Planning   The Plan for Transition of Care is related to the following treatment goals: Pt is d/c home with [facility] and follow up appointments     ANSELMO Knox    [phone]

## 2023-08-31 NOTE — PROGRESS NOTES
No special event, walked in the unit, VSS, however, his BP up to 160s/ 90-100s with activity, on NS 50 ml/hr, KF better > Creat 1.54 from 2.07,     0700  Bedside and Verbal shift change report given to Carl Estrella RN (oncoming nurse) by Marc Valdes RN (offgoing nurse).  Report included the following information SBAR, Kardex, Intake/Output, MAR, Accordion, Recent Results, Med Rec Status and Cardiac Rhythm SR.

## 2023-09-01 ENCOUNTER — TELEPHONE (OUTPATIENT)
Facility: HOSPITAL | Age: 73
End: 2023-09-01

## 2023-09-01 NOTE — TELEPHONE ENCOUNTER
Cardiac Surgery Discharge - Follow up call placed to Bambi Gentile. Reviewed plan of care after discharge and encouraged Bambi Gentile to verbalize. Discussed precautions and reviewed medications. Patient stated he only has a couple of days of Xarelto of current prescription. [facility] called to [facility] foe Xarelto 20mg, 1 po daily, #30 x 0 refills. Encouraged continued use of the incentive spirometer, showering and walking. Confirmed follow up appts. Bambi Gentile is without questions or concerns.  Vishnu Mijares RN

## 2023-09-02 NOTE — PROGRESS NOTES
Patient: Brandi Doty   Age: 68 y.o. Patient Care Team:  Rebekah Zamorano MD as PCP - General (Internal Medicine)  Rebekah Zamorano MD as PCP - [facility]  Gregoria Carter MD as Physician  Patricio Grider MD as Physician  JUAN PABLO Paredes - NP as Nurse Practitioner (Nurse Practitioner)  Terry Romberg, MD (Cardiothoracic Surgery)    Diagnosis: The primary encounter diagnosis was S/P ablation operation for arrhythmia. A diagnosis of S/P left atrial appendage ligation was also pertinent to this visit. Problem List:   Patient Active Problem List   Diagnosis    Atrial fibrillation ([address])    Venous insufficiency of both lower extremities    Hypertrophic cardiomyopathy (HCC)    Mixed hyperlipidemia    HTN (hypertension)    Status post placement of implantable loop recorder    Leg swelling    A-fib (HCC)    S/P left atrial appendage ligation    S/P ablation operation for arrhythmia        Date of Surgery: 8/28/23     Surgery: hybrid ablation/NERY clip    HPI:  Pt is here for post op follow up. He feels well. He has been increasing his activity. He denies pain. He has no complaints. He feels that he has been in \"regular rhythm\". He has not had labs drawn yet.      Current Medications:   Current Outpatient Medications   Medication Sig Dispense Refill    amiodarone (CORDARONE) 200 MG tablet Take 1 tablet by mouth 2 times daily for 23 doses 23 tablet 0    levothyroxine (SYNTHROID) 50 MCG tablet Take 1 tablet by mouth every morning (before breakfast) 30 tablet 11    rivaroxaban (XARELTO) 20 MG TABS tablet Take 1 tablet by mouth daily      dilTIAZem (DILACOR XR) 240 MG extended release capsule Take 1 capsule by mouth daily      carvedilol (COREG) 6.25 MG tablet Take by mouth 2 times daily (with meals)      isosorbide mononitrate (IMDUR) 60 MG extended release tablet Take by mouth daily      losartan (COZAAR) 50 MG tablet 1 tablet daily      lovastatin (MEVACOR) 40 MG tablet TAKE 1 TABLET

## 2023-09-05 ENCOUNTER — TELEPHONE (OUTPATIENT)
Age: 73
End: 2023-09-05

## 2023-09-05 LAB — CREATINE SERPL-MCNC: 1.3 MG/DL (ref 0–0.7)

## 2023-09-05 NOTE — TELEPHONE ENCOUNTER
I called patient for appt reminder for tomorrow. Patient confirmed the appointment. He would like a call back regarding his medication instructions. He has a question about the dosage instructions. At the time I called, he didn't know the name of the medication he had the question about.     His call back is [phone]

## 2023-09-06 ENCOUNTER — OFFICE VISIT (OUTPATIENT)
Age: 73
End: 2023-09-06

## 2023-09-06 VITALS
HEART RATE: 58 BPM | SYSTOLIC BLOOD PRESSURE: 142 MMHG | OXYGEN SATURATION: 97 % | BODY MASS INDEX: 30.53 KG/M2 | TEMPERATURE: 97.8 F | DIASTOLIC BLOOD PRESSURE: 84 MMHG | WEIGHT: 200.8 LBS | RESPIRATION RATE: 18 BRPM

## 2023-09-06 DIAGNOSIS — Z86.79 S/P ABLATION OPERATION FOR ARRHYTHMIA: Primary | ICD-10-CM

## 2023-09-06 DIAGNOSIS — N17.9 AKI (ACUTE KIDNEY INJURY) (HCC): ICD-10-CM

## 2023-09-06 DIAGNOSIS — Z98.890 S/P LEFT ATRIAL APPENDAGE LIGATION: ICD-10-CM

## 2023-09-06 DIAGNOSIS — Z98.890 S/P ABLATION OPERATION FOR ARRHYTHMIA: Primary | ICD-10-CM

## 2023-09-06 PROCEDURE — 99024 POSTOP FOLLOW-UP VISIT: CPT | Performed by: NURSE PRACTITIONER

## 2023-09-06 ASSESSMENT — PATIENT HEALTH QUESTIONNAIRE - PHQ9
SUM OF ALL RESPONSES TO PHQ QUESTIONS 1-9: 0
1. LITTLE INTEREST OR PLEASURE IN DOING THINGS: 0
SUM OF ALL RESPONSES TO PHQ QUESTIONS 1-9: 0
SUM OF ALL RESPONSES TO PHQ9 QUESTIONS 1 & 2: 0
SUM OF ALL RESPONSES TO PHQ QUESTIONS 1-9: 0
2. FEELING DOWN, DEPRESSED OR HOPELESS: 0
SUM OF ALL RESPONSES TO PHQ QUESTIONS 1-9: 0

## 2023-09-08 ENCOUNTER — TELEPHONE (OUTPATIENT)
Age: 73
End: 2023-09-08

## 2023-09-08 LAB
BUN SERPL-MCNC: 38 MG/DL (ref 8–27)
BUN/CREAT SERPL: 22 (ref 10–24)
CALCIUM SERPL-MCNC: 8.6 MG/DL (ref 8.6–10.2)
CHLORIDE SERPL-SCNC: 99 MMOL/L (ref 96–106)
CO2 SERPL-SCNC: 26 MMOL/L (ref 20–29)
CREAT SERPL-MCNC: 1.75 MG/DL (ref 0.76–1.27)
EGFRCR SERPLBLD CKD-EPI 2021: 41 ML/MIN/1.73
GLUCOSE SERPL-MCNC: 99 MG/DL (ref 70–99)
POTASSIUM SERPL-SCNC: 4.4 MMOL/L (ref 3.5–5.2)
SODIUM SERPL-SCNC: 138 MMOL/L (ref 134–144)

## 2023-09-08 NOTE — TELEPHONE ENCOUNTER
Called pt to discuss BMP results. Cr is slightly elevated from his level at discharge. He understands and will fu with PCP.

## 2023-09-12 NOTE — PROGRESS NOTES
Physician Progress Note      PATIENT:               Randy Chiang  CSN #:                  [id number]  :                       1950  ADMIT DATE:       2023 5:29 AM  DISCH DATE:        2023 11:00 AM  RESPONDING  PROVIDER #:        Jose D Patel MD          QUERY TEXT:    Pt admitted with Afib and has a history of heart failure  treated with Coreg   and Spironolactone. After further review, please specify the following: The medical record reflects the following:  Risk Factors: 67yoM w/ HCM: EF 50-55%, HTN, HLD, elevated Creatinine,   Congestive heart failure ([address])   hosp at [facility] per H&P    Clinical Indicators: 2D ECHO LVEF estimated at 50-55%; hypertrophic   cardiomyopathy. Treatment: continue Coreg, Spironolactone and Imdur. Options provided:  -- Chronic Diastolic CHF/HFpEF  -- Left heart failure  -- Both of the above  -- Other - I will add my own diagnosis  -- Disagree - Not applicable / Not valid  -- Disagree - Clinically unable to determine / Unknown  -- Refer to Clinical Documentation Reviewer    PROVIDER RESPONSE TEXT:    This patient has chronic diastolic CHF/HFpEF. Query created by: Marian Gee on 2023 9:38 AM      QUERY TEXT:    67yoM patient admitted with Afib, noted to have elevated creatinine of 1.31 on   admit which is his baseline. Creatinine increased to 1.71 the following day   peaked at 2.29 before improving to 1.54 on discharge. Pt was given IV fluids. After study, can the condition being treated be further specified as: The medical record reflects the following:  Risk Factors: HTN, baseline Creatinine of 1.3  Clinical Indicators:    Progress notes: \" Creat @ 2.07.  500 bolus was   still infusing while labs were taken. - last urinated at 0800 this morning. Bladder scan revealed 92 ml. Blood pressure 111/79. Repeat 500 ml NS bolus with repeat Cr at 1430. Hx of HF   with EF 50-55%.   ?  If Cr not improved and/or urine output remains suboptimal,

## 2023-09-20 ENCOUNTER — TELEPHONE (OUTPATIENT)
Age: 73
End: 2023-09-20

## 2023-09-20 DIAGNOSIS — R06.02 SHORTNESS OF BREATH: ICD-10-CM

## 2023-09-20 DIAGNOSIS — Z98.890 S/P ABLATION OPERATION FOR ARRHYTHMIA: Primary | ICD-10-CM

## 2023-09-20 DIAGNOSIS — Z86.79 S/P ABLATION OPERATION FOR ARRHYTHMIA: Primary | ICD-10-CM

## 2023-09-20 NOTE — TELEPHONE ENCOUNTER
Pt called reporting SOB with activity that is significantly worse then prior to surgery. Will order CXR and TTE. Pt understands. His SOB is only with activity, not at rest. He denies pain. Did not want to go to the ER.

## 2023-09-21 ENCOUNTER — APPOINTMENT (OUTPATIENT)
Facility: HOSPITAL | Age: 73
End: 2023-09-21
Attending: INTERNAL MEDICINE
Payer: MEDICARE

## 2023-09-21 ENCOUNTER — APPOINTMENT (OUTPATIENT)
Facility: HOSPITAL | Age: 73
End: 2023-09-21
Payer: MEDICARE

## 2023-09-21 ENCOUNTER — HOSPITAL ENCOUNTER (INPATIENT)
Facility: HOSPITAL | Age: 73
LOS: 1 days | Discharge: HOME | End: 2023-09-22
Attending: EMERGENCY MEDICINE | Admitting: INTERNAL MEDICINE
Payer: MEDICARE

## 2023-09-21 DIAGNOSIS — I50.43 CHF (CONGESTIVE HEART FAILURE), NYHA CLASS I, ACUTE ON CHRONIC, COMBINED (HCC): Primary | ICD-10-CM

## 2023-09-21 DIAGNOSIS — Z98.890 S/P ABLATION OPERATION FOR ARRHYTHMIA: ICD-10-CM

## 2023-09-21 DIAGNOSIS — I16.1 HYPERTENSIVE EMERGENCY: ICD-10-CM

## 2023-09-21 DIAGNOSIS — Z86.79 S/P ABLATION OPERATION FOR ARRHYTHMIA: ICD-10-CM

## 2023-09-21 DIAGNOSIS — I50.9 ACUTE CONGESTIVE HEART FAILURE, UNSPECIFIED HEART FAILURE TYPE (HCC): ICD-10-CM

## 2023-09-21 DIAGNOSIS — J96.01 ACUTE RESPIRATORY FAILURE WITH HYPOXIA (HCC): ICD-10-CM

## 2023-09-21 LAB
ALBUMIN SERPL-MCNC: 3.5 G/DL (ref 3.5–5)
ALBUMIN/GLOB SERPL: 1 (ref 1.1–2.2)
ALP SERPL-CCNC: 78 U/L (ref 45–117)
ALT SERPL-CCNC: 37 U/L (ref 12–78)
ANION GAP SERPL CALC-SCNC: 4 MMOL/L (ref 5–15)
AST SERPL-CCNC: 16 U/L (ref 15–37)
BASOPHILS # BLD: 0 K/UL (ref 0–0.1)
BASOPHILS NFR BLD: 0 % (ref 0–1)
BILIRUB SERPL-MCNC: 1 MG/DL (ref 0.2–1)
BUN SERPL-MCNC: 19 MG/DL (ref 6–20)
BUN/CREAT SERPL: 14 (ref 12–20)
CALCIUM SERPL-MCNC: 8.9 MG/DL (ref 8.5–10.1)
CHLORIDE SERPL-SCNC: 107 MMOL/L (ref 97–108)
CO2 SERPL-SCNC: 31 MMOL/L (ref 21–32)
COMMENT:: NORMAL
CREAT SERPL-MCNC: 1.37 MG/DL (ref 0.7–1.3)
DIFFERENTIAL METHOD BLD: ABNORMAL
EOSINOPHIL # BLD: 0.1 K/UL (ref 0–0.4)
EOSINOPHIL NFR BLD: 1 % (ref 0–7)
ERYTHROCYTE [DISTWIDTH] IN BLOOD BY AUTOMATED COUNT: 13.8 % (ref 11.5–14.5)
GLOBULIN SER CALC-MCNC: 3.4 G/DL (ref 2–4)
GLUCOSE SERPL-MCNC: 120 MG/DL (ref 65–100)
HCT VFR BLD AUTO: 42 % (ref 36.6–50.3)
HGB BLD-MCNC: 13.6 G/DL (ref 12.1–17)
IMM GRANULOCYTES # BLD AUTO: 0.1 K/UL (ref 0–0.04)
IMM GRANULOCYTES NFR BLD AUTO: 1 % (ref 0–0.5)
LYMPHOCYTES # BLD: 0.8 K/UL (ref 0.8–3.5)
LYMPHOCYTES NFR BLD: 15 % (ref 12–49)
MCH RBC QN AUTO: 33 PG (ref 26–34)
MCHC RBC AUTO-ENTMCNC: 32.4 G/DL (ref 30–36.5)
MCV RBC AUTO: 101.9 FL (ref 80–99)
MONOCYTES # BLD: 0.6 K/UL (ref 0–1)
MONOCYTES NFR BLD: 10 % (ref 5–13)
NEUTS SEG # BLD: 4.1 K/UL (ref 1.8–8)
NEUTS SEG NFR BLD: 73 % (ref 32–75)
NRBC # BLD: 0 K/UL (ref 0–0.01)
NRBC BLD-RTO: 0 PER 100 WBC
NT PRO BNP: ABNORMAL PG/ML
PLATELET # BLD AUTO: 297 K/UL (ref 150–400)
PMV BLD AUTO: 9.7 FL (ref 8.9–12.9)
POTASSIUM SERPL-SCNC: 3.8 MMOL/L (ref 3.5–5.1)
PROT SERPL-MCNC: 6.9 G/DL (ref 6.4–8.2)
RBC # BLD AUTO: 4.12 M/UL (ref 4.1–5.7)
SODIUM SERPL-SCNC: 142 MMOL/L (ref 136–145)
SPECIMEN HOLD: NORMAL
TROPONIN I SERPL HS-MCNC: 126 NG/L (ref 0–76)
TROPONIN I SERPL HS-MCNC: 139 NG/L (ref 0–76)
WBC # BLD AUTO: 5.6 K/UL (ref 4.1–11.1)

## 2023-09-21 PROCEDURE — 2500000003 HC RX 250 WO HCPCS: Performed by: EMERGENCY MEDICINE

## 2023-09-21 PROCEDURE — 2580000003 HC RX 258: Performed by: EMERGENCY MEDICINE

## 2023-09-21 PROCEDURE — 85025 COMPLETE CBC W/AUTO DIFF WBC: CPT

## 2023-09-21 PROCEDURE — 93005 ELECTROCARDIOGRAM TRACING: CPT | Performed by: EMERGENCY MEDICINE

## 2023-09-21 PROCEDURE — 93306 TTE W/DOPPLER COMPLETE: CPT

## 2023-09-21 PROCEDURE — 6370000000 HC RX 637 (ALT 250 FOR IP): Performed by: INTERNAL MEDICINE

## 2023-09-21 PROCEDURE — 99285 EMERGENCY DEPT VISIT HI MDM: CPT

## 2023-09-21 PROCEDURE — 71045 X-RAY EXAM CHEST 1 VIEW: CPT

## 2023-09-21 PROCEDURE — 36415 COLL VENOUS BLD VENIPUNCTURE: CPT

## 2023-09-21 PROCEDURE — 6360000002 HC RX W HCPCS: Performed by: INTERNAL MEDICINE

## 2023-09-21 PROCEDURE — 84484 ASSAY OF TROPONIN QUANT: CPT

## 2023-09-21 PROCEDURE — 83880 ASSAY OF NATRIURETIC PEPTIDE: CPT

## 2023-09-21 PROCEDURE — 6360000002 HC RX W HCPCS: Performed by: EMERGENCY MEDICINE

## 2023-09-21 PROCEDURE — 80053 COMPREHEN METABOLIC PANEL: CPT

## 2023-09-21 PROCEDURE — 2580000003 HC RX 258: Performed by: INTERNAL MEDICINE

## 2023-09-21 PROCEDURE — 2060000000 HC ICU INTERMEDIATE R&B

## 2023-09-21 RX ORDER — ATORVASTATIN CALCIUM 10 MG/1
10 TABLET, FILM COATED ORAL DAILY
Status: DISCONTINUED | OUTPATIENT
Start: 2023-09-21 | End: 2023-09-22 | Stop reason: HOSPADM

## 2023-09-21 RX ORDER — LEVOTHYROXINE SODIUM 0.05 MG/1
50 TABLET ORAL
Status: DISCONTINUED | OUTPATIENT
Start: 2023-09-22 | End: 2023-09-22 | Stop reason: HOSPADM

## 2023-09-21 RX ORDER — ONDANSETRON 4 MG/1
4 TABLET, ORALLY DISINTEGRATING ORAL EVERY 8 HOURS PRN
Status: DISCONTINUED | OUTPATIENT
Start: 2023-09-21 | End: 2023-09-22 | Stop reason: HOSPADM

## 2023-09-21 RX ORDER — ONDANSETRON 2 MG/ML
4 INJECTION INTRAMUSCULAR; INTRAVENOUS EVERY 6 HOURS PRN
Status: DISCONTINUED | OUTPATIENT
Start: 2023-09-21 | End: 2023-09-22 | Stop reason: HOSPADM

## 2023-09-21 RX ORDER — SODIUM CHLORIDE 0.9 % (FLUSH) 0.9 %
5-40 SYRINGE (ML) INJECTION PRN
Status: DISCONTINUED | OUTPATIENT
Start: 2023-09-21 | End: 2023-09-22 | Stop reason: HOSPADM

## 2023-09-21 RX ORDER — ONDANSETRON 2 MG/ML
4 INJECTION INTRAMUSCULAR; INTRAVENOUS EVERY 4 HOURS PRN
Status: DISCONTINUED | OUTPATIENT
Start: 2023-09-21 | End: 2023-09-21

## 2023-09-21 RX ORDER — POLYETHYLENE GLYCOL 3350 17 G/17G
17 POWDER, FOR SOLUTION ORAL DAILY PRN
Status: DISCONTINUED | OUTPATIENT
Start: 2023-09-21 | End: 2023-09-22 | Stop reason: HOSPADM

## 2023-09-21 RX ORDER — FUROSEMIDE 10 MG/ML
40 INJECTION INTRAMUSCULAR; INTRAVENOUS 2 TIMES DAILY
Status: DISCONTINUED | OUTPATIENT
Start: 2023-09-21 | End: 2023-09-22 | Stop reason: HOSPADM

## 2023-09-21 RX ORDER — ACETAMINOPHEN 325 MG/1
650 TABLET ORAL EVERY 6 HOURS PRN
Status: DISCONTINUED | OUTPATIENT
Start: 2023-09-21 | End: 2023-09-22 | Stop reason: HOSPADM

## 2023-09-21 RX ORDER — SODIUM CHLORIDE 9 MG/ML
INJECTION, SOLUTION INTRAVENOUS PRN
Status: DISCONTINUED | OUTPATIENT
Start: 2023-09-21 | End: 2023-09-22 | Stop reason: HOSPADM

## 2023-09-21 RX ORDER — FUROSEMIDE 10 MG/ML
40 INJECTION INTRAMUSCULAR; INTRAVENOUS ONCE
Status: COMPLETED | OUTPATIENT
Start: 2023-09-21 | End: 2023-09-21

## 2023-09-21 RX ORDER — CARVEDILOL 6.25 MG/1
6.25 TABLET ORAL ONCE
Status: DISCONTINUED | OUTPATIENT
Start: 2023-09-21 | End: 2023-09-22 | Stop reason: HOSPADM

## 2023-09-21 RX ORDER — ACETAMINOPHEN 650 MG/1
650 SUPPOSITORY RECTAL EVERY 6 HOURS PRN
Status: DISCONTINUED | OUTPATIENT
Start: 2023-09-21 | End: 2023-09-22 | Stop reason: HOSPADM

## 2023-09-21 RX ORDER — CARVEDILOL 6.25 MG/1
6.25 TABLET ORAL 2 TIMES DAILY
Status: DISCONTINUED | OUTPATIENT
Start: 2023-09-22 | End: 2023-09-21

## 2023-09-21 RX ORDER — CARVEDILOL 6.25 MG/1
6.25 TABLET ORAL
Status: DISCONTINUED | OUTPATIENT
Start: 2023-09-21 | End: 2023-09-21

## 2023-09-21 RX ORDER — DILTIAZEM HYDROCHLORIDE 120 MG/1
120 CAPSULE, COATED, EXTENDED RELEASE ORAL DAILY
Status: DISCONTINUED | OUTPATIENT
Start: 2023-09-21 | End: 2023-09-22 | Stop reason: HOSPADM

## 2023-09-21 RX ORDER — SODIUM CHLORIDE 0.9 % (FLUSH) 0.9 %
5-40 SYRINGE (ML) INJECTION EVERY 12 HOURS SCHEDULED
Status: DISCONTINUED | OUTPATIENT
Start: 2023-09-21 | End: 2023-09-22 | Stop reason: HOSPADM

## 2023-09-21 RX ORDER — CARVEDILOL 6.25 MG/1
6.25 TABLET ORAL 2 TIMES DAILY
Status: DISCONTINUED | OUTPATIENT
Start: 2023-09-22 | End: 2023-09-22 | Stop reason: HOSPADM

## 2023-09-21 RX ORDER — ISOSORBIDE MONONITRATE 30 MG/1
60 TABLET, EXTENDED RELEASE ORAL DAILY
Status: DISCONTINUED | OUTPATIENT
Start: 2023-09-21 | End: 2023-09-22 | Stop reason: HOSPADM

## 2023-09-21 RX ORDER — ACETAMINOPHEN 325 MG/1
650 TABLET ORAL EVERY 4 HOURS PRN
Status: DISCONTINUED | OUTPATIENT
Start: 2023-09-21 | End: 2023-09-21

## 2023-09-21 RX ORDER — CARVEDILOL 6.25 MG/1
6.25 TABLET ORAL 2 TIMES DAILY WITH MEALS
Status: DISCONTINUED | OUTPATIENT
Start: 2023-09-21 | End: 2023-09-21

## 2023-09-21 RX ADMIN — SODIUM CHLORIDE 5 MG/HR: 9 INJECTION, SOLUTION INTRAVENOUS at 16:23

## 2023-09-21 RX ADMIN — ATORVASTATIN CALCIUM 10 MG: 10 TABLET, FILM COATED ORAL at 16:51

## 2023-09-21 RX ADMIN — SODIUM CHLORIDE, PRESERVATIVE FREE 10 ML: 5 INJECTION INTRAVENOUS at 23:09

## 2023-09-21 RX ADMIN — FUROSEMIDE 40 MG: 10 INJECTION, SOLUTION INTRAMUSCULAR; INTRAVENOUS at 12:25

## 2023-09-21 RX ADMIN — ISOSORBIDE MONONITRATE 60 MG: 30 TABLET, EXTENDED RELEASE ORAL at 15:46

## 2023-09-21 RX ADMIN — SODIUM CHLORIDE 5 MG/HR: 9 INJECTION, SOLUTION INTRAVENOUS at 12:26

## 2023-09-21 RX ADMIN — DILTIAZEM HYDROCHLORIDE 120 MG: 120 CAPSULE, COATED, EXTENDED RELEASE ORAL at 16:51

## 2023-09-21 RX ADMIN — FUROSEMIDE 40 MG: 10 INJECTION, SOLUTION INTRAMUSCULAR; INTRAVENOUS at 18:07

## 2023-09-21 RX ADMIN — CARVEDILOL 6.25 MG: 6.25 TABLET, FILM COATED ORAL at 15:46

## 2023-09-21 ASSESSMENT — PAIN - FUNCTIONAL ASSESSMENT: PAIN_FUNCTIONAL_ASSESSMENT: NONE - DENIES PAIN

## 2023-09-21 NOTE — CONSULTS
Cardiac Surgery Consult    Subjective:      Paula Carmona is a 68 y.o. male who had a recent hybrid ablation/NERY clip on 8/28/23 by Dr. Jan Calderon. He has a history of PMH of afib, HTN, HLD, cardiomyopathy, carotid atherosclerosis, throat cancer s/p radiation, esophageal stricture s/p esophageal dilation 2013, hypothyroid. Previous ablations 2017. He came to the ER with worsening SOB and HTN. He has had worsening SOB over the past couple of weeks. He is normally able to go up and down his stairs many times a day but yesterday he had to rest after going up the stairs once. This morning he had SOB at rest, was unable to lay flat, and noticed worsening LE edema. He also had significant HTN and called his cardiologist who told him to go to the ER. He has received IV lasix and is sitting up on the side of the bed eating lunch. He still feels SOB. He denies CP. Pt lives alone. 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 He is a nonsmoker, drinks none/day, and denies illicit drug use. Pt has a significant family history of afib. He's had the [employer] COVID-19 vaccine.     Cardiac Testing    Cardiac catheterization: none    ECHO: pending    Past Medical History:   Diagnosis Date    ABRAM (acute kidney injury) ([address]) 8/31/2023    Arrhythmia     a fib    Atrial fibrillation (HCC)     Cancer ([address])     skin, throat cancer    Congestive heart failure ([address]) 11/2020    hosp at [facility]    G tube feedings ([address])     PEG    High cholesterol     Hypertension     Hypertrophic cardiomyopathy ([address])     per cardiology notes 6/2013    Long term current use of anticoagulant therapy     MI (myocardial infarction) ([address])     per pt, was told he had MI over 10 years    Status post chemoradiation     completed 12/2013    Throat cancer ([address])     Viral meningitis      Past Surgical History:   Procedure Laterality Date    CARDIAC CATHETERIZATION  2007    CARDIAC ELECTROPHYSIOLOGY STUDY AND

## 2023-09-21 NOTE — CARE COORDINATION
Care Management Initial Assessment       RUR: 13  Readmission? Yes   1st IM letter given? Yes   1st  letter given: No       09/21/23 1948   Service Assessment   Patient Orientation Alert and Oriented   Cognition Alert   History Provided By Patient   Primary Caregiver Self   Support Systems Spouse/Significant Other;Family Members; [address] is: Legal Next of Kin   PCP Verified by CM Yes   Last Visit to PCP Within last 3 months   Prior Functional Level Independent in ADLs/IADLs   Current Functional Level Independent in ADLs/IADLs   Can patient return to prior living arrangement Yes   Ability to make needs known: Good   Family able to assist with home care needs: Yes   Financial Resources Medicare   Social/Functional History   Lives With [place] One level   ADL Assistance Independent   Ambulation Assistance Independent   Transfer Assistance Independent   Active  Yes  (Pt drove to hospital and parked his car in parking lot.  he is planing to drive back home.)   Mode of Transportation Car   Occupation Retired   Discharge Planning   Patient expects to be discharged to: [facility]  (Pt had [facility] [place] services via [employer]   Services At/After Discharge   Mode of Transport at Discharge Self       Readmission Assessment  Number of Days since last admission?: 8-30 days  Previous Disposition: Home with Barry Patrick  Who is being Interviewed: Patient  What was the patient's/caregiver's perception as to why they think they needed to return back to the hospital?: Did not realize care needs would be so extensive  Did you visit your Primary Care Physician after you left the hospital, before you returned this time?: Yes  Did you see a specialist, such as Cardiac, Pulmonary, Orthopedic Physician, etc. after you left the hospital?: Yes  Who advised the patient to return to the hospital?: Self-referral  In our efforts to provide the best possible care to you and others like you, can you think of anything that we could have done to help you after you left the hospital the first time, so that you might not have needed to return so soon?: Arrange for more help when leaving the hospital, Additional Community resources available for illness [address]  [place]    Ext [phone]

## 2023-09-21 NOTE — ED TRIAGE NOTES
Pt to er from home for c/o sob and increased swelling in his ankles the last few days. Pt states he had an ablation 3 weeks ago for afib and was feeling fine. During triage pt hypoxic in the 80's and having difficulty speaking in full sentences.

## 2023-09-21 NOTE — PROGRESS NOTES
1611: Pt arrived from ED to PCU, bedside report received in ED from Misael Varela RN. Dual skin assessment Dorothea MARTINEZ RN and Sarah Grace, RN    1900:End of Shift Note    Bedside shift change report given to Amigo (oncoming nurse) by Viet Taylor RN (offgoing nurse). Report included the following information SBAR and MAR    Shift worked:  7a-7p     Shift summary and any significant changes:     Nicardipine gtt stopped. BP WDL. See chart and aforementioned notes for additional information. Concerns for physician to address:  None at this time     Zone phone for oncoming shift:          Activity:     Number times ambulated in hallways past shift: 0  Number of times OOB to chair past shift: 0    Cardiac:   Cardiac Monitoring: Yes           Access:  Current line(s): PIV     Genitourinary:   Urinary status: voiding    Respiratory:      Chronic home O2 use?: NO  Incentive spirometer at bedside: NO       GI:     Current diet:  Diet NPO  Passing flatus: YES  Tolerating current diet: YES       Pain Management:   Patient states pain is manageable on current regimen: YES    Skin:     Interventions: increase time out of bed and PT/OT consult    Patient Safety:  Fall Score:    Interventions: bed/chair alarm, assistive device (walker, cane.  etc), gripper socks, and pt to call before getting OOB       Length of Stay:  Expected LOS: 3  Actual LOS: [address], RN

## 2023-09-21 NOTE — ED PROVIDER NOTES
[facility] [address] [facility]       Pt Name: Val Jaramillo  MRN: [id number]  [address] [facility] 1950  Date of evaluation: 9/21/2023  Provider: Elnoria Brunner, MD   PCP: Rivka Sargent MD  Note Started: 11:02 AM EDT 9/21/23     CHIEF COMPLAINT       Chief Complaint   Patient presents with    Shortness of Breath        HISTORY OF PRESENT ILLNESS: 1 or more elements      History From: patient, History limited by: none     Val Jaramillo is a 68 y.o. male who presents with a chief complaint of shortness of breath progressively worsening for the last several weeks as well as persistently elevated blood pressures. Please See MDM for Additional Details of the HPI/PMH  Nursing Notes were all reviewed and agreed with or any disagreements were addressed in the HPI. REVIEW OF SYSTEMS        Positives and Pertinent negatives as per HPI.     PAST HISTORY     Past Medical History:  Past Medical History:   Diagnosis Date    ABRAM (acute kidney injury) ([address]) 8/31/2023    Arrhythmia     a fib    Atrial fibrillation (HCC)     Cancer ([address])     skin, throat cancer    Congestive heart failure ([address]) 11/2020    hosp at [facility]    G tube feedings ([address])     PEG    High cholesterol     Hypertension     Hypertrophic cardiomyopathy ([address])     per cardiology notes 6/2013    Long term current use of anticoagulant therapy     MI (myocardial infarction) ([address])     per pt, was told he had MI over 10 years    Status post chemoradiation     completed 12/2013    Throat cancer ([address])     Viral meningitis        Past Surgical History:  Past Surgical History:   Procedure Laterality Date    CARDIAC CATHETERIZATION  2007    CARDIAC ELECTROPHYSIOLOGY STUDY AND ABLATION  2017    CARDIAC SURGERY N/A 8/28/2023    TRANSPERICARDIAL HYBRID ABLATION WITH FLUORO, LEFT ATRIAL APPENDAGE CLIP VIA LEFT VATS, JOHN BY DR YO (REQ HYBRID) performed by Comfort Espinosa MD at [facility]    COLONOSCOPY N/A 1/17/2017    COLONOSCOPY performed by 1.1 - 2.2     Troponin    Collection Time: 09/21/23 10:59 AM   Result Value Ref Range    Troponin, High Sensitivity 139 (HH) 0 - 76 ng/L   Extra Tubes Hold    Collection Time: 09/21/23 10:59 AM   Result Value Ref Range    Specimen HOld BLUE, RED     Comment:        Add-on orders for these samples will be processed based on acceptable specimen integrity and analyte stability, which may vary by analyte.    Brain Natriuretic Peptide    Collection Time: 09/21/23 10:59 AM   Result Value Ref Range    NT Pro-BNP 10,042 (H) <125 PG/ML   Troponin    Collection Time: 09/21/23  1:29 PM   Result Value Ref Range    Troponin, High Sensitivity 126 (HH) 0 - 76 ng/L   Echo (TTE) complete (with contrast/ bubble/ strain/ 3D PRN)    Collection Time: 09/21/23  3:13 PM   Result Value Ref Range    Body Surface Area 2.07 m2    IVSd 1.6 (A) 0.6 - 1.0 cm    LVIDd 4.5 4.2 - 5.9 cm    LVIDs 3.0 cm    LVOT Diameter 2.1 cm    LVPWd 1.2 (A) 0.6 - 1.0 cm    LV Ejection Fraction A4C 54 %    LV EDV A4C 101 mL    LV ESV A4C 46 mL    LVOT Peak Gradient 7 mmHg    LVOT Mean Gradient 4 mmHg    LVOT SV 85.9 ml    LVOT Peak Velocity 1.3 m/s    LVOT VTI 24.8 cm    RV Free Wall Peak S' 18 cm/s    LA Diameter 5.6 cm    LA Volume 4C 68 (A) 18 - 58 mL    LA Volume 4C 64 (A) 18 - 58 mL    AV Area by Peak Velocity 2.5 cm2    AV Area by VTI 2.9 cm2    AV Peak Gradient 13 mmHg    AV Mean Gradient 7 mmHg    AV Peak Velocity 1.8 m/s    AV Mean Velocity 1.2 m/s    AV VTI 29.2 cm    MV A Velocity 0.23 m/s    MV E Wave Deceleration Time 161.4 ms    MV E Velocity 0.79 m/s    LV E' Lateral Velocity 6 cm/s    LV E' Septal Velocity 3 cm/s    MV Area by VTI 3.8 cm2    MV Peak Gradient 3 mmHg    MV Mean Gradient 1 mmHg    MV Max Velocity 0.9 m/s    MV Mean Velocity 0.4 m/s    MV VTI 22.8 cm    TAPSE 2.0 1.7 cm    TR Peak Gradient 57 mmHg    TR Max Velocity 3.76 m/s    Ascending Aorta 3.8 cm    Fractional Shortening 2D 33 28 - 44 %    LV ESV Index A4C 23 mL/m2    LV EDV Index

## 2023-09-21 NOTE — PROGRESS NOTES
1611: 4 Eyes Skin Assessment     NAME:  [facility],[address] BIRTH:  1950  MEDICAL RECORD NUMBER:  [id number]    The patient is being assessed for  Admission    I agree that at least one RN has performed a thorough Head to Toe Skin Assessment on the patient. ALL assessment sites listed below have been assessed. Areas assessed by both nurses:    Head, Face, Ears, Shoulders, Back, Chest, Arms, Elbows, Hands, Sacrum. Buttock, Coccyx, Ischium, Legs. Feet and Heels, and Under Medical Devices         Does the Patient have a Wound?  No noted wound(s)       Sina Prevention initiated by RN: No  Wound Care Orders initiated by RN: No    Pressure Injury (Stage 3,4, Unstageable, DTI, NWPT, and Complex wounds) if present, place Wound referral order by RN under : No    New Ostomies, if present place, Ostomy referral order under : No     Nurse 1 eSignature: Electronically signed by Kimberlee Patiño RN on 9/21/23 at 4:12 PM EDT    **SHARE this note so that the co-signing nurse can place an eSignature**    Nurse 2 eSignature: Electronically signed by Stefano Paris RN on 9/21/23 at 4:15 PM EDT

## 2023-09-21 NOTE — ED NOTES
Cardene gtt started @ 5mg/hr. Pt given lunch tray. Son at bedside.         Lyndon Peralta RN  09/21/23 9768

## 2023-09-21 NOTE — H&P
Hospitalist Admission Note    NAME:   Andriy Garcia   : 1950   MRN: [id number]     Date/Time: 2023 1:33 PM    Patient PCP: Tyler Ty MD    ______________________________________________________________________  Given the patient's current clinical presentation, I have a high level of concern for decompensation if discharged from the emergency department. Complex decision making was performed, which includes reviewing the patient's available past medical records, laboratory results, and x-ray films. My assessment of this patient's clinical condition and my plan of care is as follows. Assessment / Plan:      Hypertensive emergency  Acute CHF  Rule out pericardial effusion/tamponade  Persistent A-fib  Acute hypoxic respiratory failure  HTN    -, hybrid ablation and NERY clip, trans pericardial route  -Progressive SOB/leg swelling  -CXR consistent with CHF  -Blood pressure 228/124.  80% on room air  -Received Lasix 40 IV once  -Started on nicardipine drip in the ED, continue  -Echo initially ordered as routine, discussed with dr Julián Travis, wants STAT, will notify echo department  -Lasix 40 IV twice daily, strict I's and O's  -N.p.o. for now, can advance diet if no emergent procedure needed by CTS  -Continue Coreg 6.25 mg twice daily  -Continue Cardizem PO XL, reduced dose to 125 mg daily  -Continue Imdur  -Consult cardiology    CKD stage III, creatinine appears to be at baseline    Hypothyroidism, continue levothyroxine    History of esophageal stricture, status post dilation last month    HLD, statin              Medical Decision Making:   I personally reviewed labs: cbc/bmp  I personally reviewed imaging:cxr  I personally reviewed EKG:  Toxic drug monitoring:   Discussed case with: ED provider. After discussion I am in agreement that acuity of patient's medical condition necessitates hospital stay.       Code Status: full code  DVT Prophylaxis: Holding until echo  GI

## 2023-09-22 VITALS
HEIGHT: 67 IN | RESPIRATION RATE: 15 BRPM | BODY MASS INDEX: 31.38 KG/M2 | TEMPERATURE: 98.1 F | OXYGEN SATURATION: 92 % | HEART RATE: 63 BPM | WEIGHT: 199.96 LBS | SYSTOLIC BLOOD PRESSURE: 135 MMHG | DIASTOLIC BLOOD PRESSURE: 87 MMHG

## 2023-09-22 LAB
ANION GAP SERPL CALC-SCNC: 4 MMOL/L (ref 5–15)
BUN SERPL-MCNC: 21 MG/DL (ref 6–20)
BUN/CREAT SERPL: 15 (ref 12–20)
CALCIUM SERPL-MCNC: 8.3 MG/DL (ref 8.5–10.1)
CHLORIDE SERPL-SCNC: 104 MMOL/L (ref 97–108)
CO2 SERPL-SCNC: 32 MMOL/L (ref 21–32)
CREAT SERPL-MCNC: 1.41 MG/DL (ref 0.7–1.3)
ECHO AO ASC DIAM: 3.8 CM
ECHO AO ASCENDING AORTA INDEX: 1.88 CM/M2
ECHO AV AREA PEAK VELOCITY: 2.5 CM2
ECHO AV AREA VTI: 2.9 CM2
ECHO AV AREA/BSA PEAK VELOCITY: 1.2 CM2/M2
ECHO AV AREA/BSA VTI: 1.4 CM2/M2
ECHO AV MEAN GRADIENT: 7 MMHG
ECHO AV MEAN VELOCITY: 1.2 M/S
ECHO AV PEAK GRADIENT: 13 MMHG
ECHO AV PEAK VELOCITY: 1.8 M/S
ECHO AV VELOCITY RATIO: 0.72
ECHO AV VTI: 29.2 CM
ECHO BSA: 2.07 M2
ECHO EST RA PRESSURE: 8 MMHG
ECHO LA DIAMETER INDEX: 2.77 CM/M2
ECHO LA DIAMETER: 5.6 CM
ECHO LA VOL 4C: 64 ML (ref 18–58)
ECHO LA VOL 4C: 68 ML (ref 18–58)
ECHO LV E' LATERAL VELOCITY: 6 CM/S
ECHO LV E' SEPTAL VELOCITY: 3 CM/S
ECHO LV EDV A4C: 101 ML
ECHO LV EDV INDEX A4C: 50 ML/M2
ECHO LV EJECTION FRACTION A4C: 54 %
ECHO LV ESV A4C: 46 ML
ECHO LV ESV INDEX A4C: 23 ML/M2
ECHO LV FRACTIONAL SHORTENING: 33 % (ref 28–44)
ECHO LV INTERNAL DIMENSION DIASTOLE INDEX: 2.23 CM/M2
ECHO LV INTERNAL DIMENSION DIASTOLIC: 4.5 CM (ref 4.2–5.9)
ECHO LV INTERNAL DIMENSION SYSTOLIC INDEX: 1.49 CM/M2
ECHO LV INTERNAL DIMENSION SYSTOLIC: 3 CM
ECHO LV IVSD: 1.6 CM (ref 0.6–1)
ECHO LV MASS 2D: 248.4 G (ref 88–224)
ECHO LV MASS INDEX 2D: 123 G/M2 (ref 49–115)
ECHO LV POSTERIOR WALL DIASTOLIC: 1.2 CM (ref 0.6–1)
ECHO LV RELATIVE WALL THICKNESS RATIO: 0.53
ECHO LVOT AREA: 3.5 CM2
ECHO LVOT AV VTI INDEX: 0.85
ECHO LVOT DIAM: 2.1 CM
ECHO LVOT MEAN GRADIENT: 4 MMHG
ECHO LVOT PEAK GRADIENT: 7 MMHG
ECHO LVOT PEAK VELOCITY: 1.3 M/S
ECHO LVOT STROKE VOLUME INDEX: 42.5 ML/M2
ECHO LVOT SV: 85.9 ML
ECHO LVOT VTI: 24.8 CM
ECHO MV A VELOCITY: 0.23 M/S
ECHO MV AREA VTI: 3.8 CM2
ECHO MV E DECELERATION TIME (DT): 161.4 MS
ECHO MV E VELOCITY: 0.79 M/S
ECHO MV E/A RATIO: 3.43
ECHO MV E/E' LATERAL: 13.17
ECHO MV E/E' RATIO (AVERAGED): 19.75
ECHO MV E/E' SEPTAL: 26.33
ECHO MV LVOT VTI INDEX: 0.92
ECHO MV MAX VELOCITY: 0.9 M/S
ECHO MV MEAN GRADIENT: 1 MMHG
ECHO MV MEAN VELOCITY: 0.4 M/S
ECHO MV PEAK GRADIENT: 3 MMHG
ECHO MV VTI: 22.8 CM
ECHO RIGHT VENTRICULAR SYSTOLIC PRESSURE (RVSP): 65 MMHG
ECHO RV FREE WALL PEAK S': 18 CM/S
ECHO RV TAPSE: 2 CM (ref 1.7–?)
ECHO TV REGURGITANT MAX VELOCITY: 3.76 M/S
ECHO TV REGURGITANT PEAK GRADIENT: 57 MMHG
ERYTHROCYTE [DISTWIDTH] IN BLOOD BY AUTOMATED COUNT: 13.4 % (ref 11.5–14.5)
GLUCOSE SERPL-MCNC: 101 MG/DL (ref 65–100)
HCT VFR BLD AUTO: 37.5 % (ref 36.6–50.3)
HGB BLD-MCNC: 12.2 G/DL (ref 12.1–17)
MAGNESIUM SERPL-MCNC: 2.3 MG/DL (ref 1.6–2.4)
MCH RBC QN AUTO: 33.2 PG (ref 26–34)
MCHC RBC AUTO-ENTMCNC: 32.5 G/DL (ref 30–36.5)
MCV RBC AUTO: 102.2 FL (ref 80–99)
NRBC # BLD: 0 K/UL (ref 0–0.01)
NRBC BLD-RTO: 0 PER 100 WBC
PLATELET # BLD AUTO: 242 K/UL (ref 150–400)
PMV BLD AUTO: 9.7 FL (ref 8.9–12.9)
POTASSIUM SERPL-SCNC: 3.2 MMOL/L (ref 3.5–5.1)
PROCALCITONIN SERPL-MCNC: <0.05 NG/ML
RBC # BLD AUTO: 3.67 M/UL (ref 4.1–5.7)
SODIUM SERPL-SCNC: 140 MMOL/L (ref 136–145)
WBC # BLD AUTO: 3.5 K/UL (ref 4.1–11.1)

## 2023-09-22 PROCEDURE — 84145 PROCALCITONIN (PCT): CPT

## 2023-09-22 PROCEDURE — 36415 COLL VENOUS BLD VENIPUNCTURE: CPT

## 2023-09-22 PROCEDURE — 83735 ASSAY OF MAGNESIUM: CPT

## 2023-09-22 PROCEDURE — 6370000000 HC RX 637 (ALT 250 FOR IP): Performed by: NURSE PRACTITIONER

## 2023-09-22 PROCEDURE — 6370000000 HC RX 637 (ALT 250 FOR IP): Performed by: INTERNAL MEDICINE

## 2023-09-22 PROCEDURE — 2580000003 HC RX 258: Performed by: INTERNAL MEDICINE

## 2023-09-22 PROCEDURE — 80048 BASIC METABOLIC PNL TOTAL CA: CPT

## 2023-09-22 PROCEDURE — 6360000002 HC RX W HCPCS: Performed by: INTERNAL MEDICINE

## 2023-09-22 PROCEDURE — 85027 COMPLETE CBC AUTOMATED: CPT

## 2023-09-22 RX ORDER — POTASSIUM CHLORIDE 750 MG/1
40 TABLET, FILM COATED, EXTENDED RELEASE ORAL ONCE
Status: COMPLETED | OUTPATIENT
Start: 2023-09-22 | End: 2023-09-22

## 2023-09-22 RX ORDER — FUROSEMIDE 40 MG/1
40 TABLET ORAL DAILY
Qty: 15 TABLET | Refills: 1 | Status: SHIPPED | OUTPATIENT
Start: 2023-09-22 | End: 2023-09-22 | Stop reason: SDUPTHER

## 2023-09-22 RX ORDER — FUROSEMIDE 40 MG/1
40 TABLET ORAL DAILY
Qty: 15 TABLET | Refills: 1 | Status: SHIPPED | OUTPATIENT
Start: 2023-09-22 | End: 2023-10-22

## 2023-09-22 RX ORDER — POTASSIUM CHLORIDE 750 MG/1
10 TABLET, EXTENDED RELEASE ORAL DAILY
Qty: 30 TABLET | Refills: 1 | Status: SHIPPED | OUTPATIENT
Start: 2023-09-22

## 2023-09-22 RX ADMIN — SODIUM CHLORIDE, PRESERVATIVE FREE 10 ML: 5 INJECTION INTRAVENOUS at 07:50

## 2023-09-22 RX ADMIN — FUROSEMIDE 40 MG: 10 INJECTION, SOLUTION INTRAMUSCULAR; INTRAVENOUS at 07:50

## 2023-09-22 RX ADMIN — ISOSORBIDE MONONITRATE 60 MG: 30 TABLET, EXTENDED RELEASE ORAL at 07:50

## 2023-09-22 RX ADMIN — ATORVASTATIN CALCIUM 10 MG: 10 TABLET, FILM COATED ORAL at 07:50

## 2023-09-22 RX ADMIN — DILTIAZEM HYDROCHLORIDE 120 MG: 120 CAPSULE, COATED, EXTENDED RELEASE ORAL at 07:50

## 2023-09-22 RX ADMIN — POTASSIUM CHLORIDE 40 MEQ: 750 TABLET, FILM COATED, EXTENDED RELEASE ORAL at 10:09

## 2023-09-22 RX ADMIN — CARVEDILOL 6.25 MG: 6.25 TABLET, FILM COATED ORAL at 07:49

## 2023-09-22 RX ADMIN — LEVOTHYROXINE SODIUM 50 MCG: 0.05 TABLET ORAL at 05:37

## 2023-09-22 NOTE — DISCHARGE SUMMARY
Discharge Summary    Name: Bambi Gentile  [id number]  YOB: 1950 (Age: 68 y.o.)   Date of Admission: 9/21/2023  Date of Discharge: 9/22/2023  Attending Physician: Vivien Quinteros MD    Discharge Diagnosis:     Hypertensive emergency  Acute CHF  Persistent A-fib  Acute hypoxic respiratory failure  HTN  Hypothyroidism, continue levothyroxine  History of esophageal stricture, status post dilation last month  HLD, statin    Consultations:  IP CONSULT TO HOSPITALIST  IP CONSULT TO CARDIOVASCULAR SURGERY  IP CONSULT TO CARDIOLOGY      Brief Admission History/Reason for Admission Per Valentina Gardner MD:   Romero Osuna is a 68 y.o.  male with PMHx significant for persistent A-fib, had a hybrid ablation and NERY clip on 8/28, history of HTN, HLD who presented to ED with a chief complaint of shortness of breath and hypoxia. After the procedure reports progressive shortness of breath, which is got worse in past 1 week, with leg swelling and weight gain. He checked his oxygen at home and it was 83% on room air. Denies any chest pain, fever, diarrhea. Reports compliance with his home medications  We were asked to admit for work up and evaluation of the above problems. Brief Hospital Course by Main Problems:   Patient was admitted to hospital and noted to have hypertensive emergency along with acute congestive heart failure. Patient underwent hybrid ablation and also NERY clip via pericardial route on 8/28. Patient was started on Cardene drip and a consultation was placed with cardiology for further evaluation. Patient was seen by cardiology and underwent echocardiogram which shows no evidence of recurrent effusion. Patient was placed on IV Lasix with improvement of shortness of breath and also improved her blood pressure as well.   She was seen by cardiology and felt that patient is with good symptomatic improvement and cleared the patient for discharge on Lasix [address], [place]  [facility]  [phone]    Schedule an appointment as soon as possible for a visit in 1 week(s)            Total time in minutes spent coordinating this discharge (includes going over instructions, follow-up, prescriptions, and preparing report for sign off to her PCP) :  35 minutes

## 2023-09-22 NOTE — DISCHARGE INSTRUCTIONS
Patient Discharge Instructions    Tripp Rodriguez / [id number] : 1950    Admitted 2023 Discharged: 2023         DISCHARGE DIAGNOSIS:   Hypertensive emergency  Acute CHF  Persistent A-fib  Acute hypoxic respiratory failure  HTN  Hypothyroidism, continue levothyroxine  History of esophageal stricture, status post dilation last month  HLD, statin      Take Home Medications     {Medication reconciliation information is now added to the patient's AVS automatically when it is printed. There is no need to use this SmartLink in discharge instructions. Highlight this text and delete it to clear this message}      General drug facts     If you have a very bad allergy, wear an allergy ID at all times. It is important that you take the medication exactly as they are prescribed. Keep your medication in the bottles provided by the pharmacist.  Keep a list of all your drugs (prescription, natural products, vitamins, OTC) with you. Give this list to your doctor. Do not take other medications without consulting your doctor. Do not share your drugs with others and do not take anyone else's drugs. Keep all drugs out of the reach of children and pets. Most drugs may be thrown away in household trash after mixing with coffee grounds or micheline litter and sealing in a plastic bag. Keep a list Call your doctor for help with any side effects. If in the [place] you may also call the FDA at 2-894-FDA-2752    Talk with the doctor before starting any new drug, including OTC, natural products, or vitamins. What to do at Home    1. Recommended diet: Regular     2. Recommended activity: activity as tolerated    3. If you experience any of the following symptoms then please call your primary care physician or return to the emergency room if you cannot get hold of your doctor:    4. Wound Care: None    5. Lab work: cbc and bmp in 1 week     6. Bring these papers with you to your follow up appointments.  The

## 2023-09-22 NOTE — PROGRESS NOTES
1900: Bedside shift change report given to Anurag Zelaya (oncoming nurse) by Sherif Dunne RN (offgoing nurse). Report included the following information Nurse Handoff Report, Intake/Output, MAR, Recent Results, and Cardiac Rhythm NSR . End of Shift Note    Bedside shift change report given to David Aguillon (oncoming nurse) by Janice Schwarz RN (offgoing nurse). Report included the following information SBAR, Kardex, ED Summary, Intake/Output, MAR, Recent Results, and Cardiac Rhythm NSR    Shift worked:  0828-0125     Shift summary and any significant changes:     No acute changes overnight      Concerns for physician to address:  None at this time      Zone phone for oncoming shift:          Activity:     Number times ambulated in hallways past shift: 0  Number of times OOB to chair past shift: 0    Cardiac:   Cardiac Monitoring: Yes           Access:  Current line(s): PIV     Genitourinary:   Urinary status: voiding    Respiratory:      Chronic home O2 use?: NO  Incentive spirometer at bedside: YES       GI:     Current diet:  Diet NPO  ADULT DIET;  Regular; Low Fat/Low Chol/High Fiber/KRISTINA  Passing flatus: YES  Tolerating current diet: YES       Pain Management:   Patient states pain is manageable on current regimen: YES    Skin:     Interventions: float heels and increase time out of bed    Patient Safety:  Fall Score:    Interventions: gripper socks and pt to call before getting OOB       Length of Stay:  Expected LOS: 3  Actual LOS: 0      Janice Schwarz RN

## 2023-09-22 NOTE — PLAN OF CARE
Problem: Discharge Planning  Goal: Discharge to home or other facility with appropriate resources  Outcome: Adequate for Discharge     Problem: Chronic Conditions and Co-morbidities  Goal: Patient's chronic conditions and co-morbidity symptoms are monitored and maintained or improved  Outcome: Adequate for Discharge     Problem: Safety - Adult  Goal: Free from fall injury  Outcome: Adequate for Discharge

## 2023-09-22 NOTE — CARE COORDINATION
Transition of Care Plan:    RUR: 12% (Low RUR)  Prior Level of Functioning: Independent in ADLs/IADLs  Disposition: Home with follow up appointment   If SNF or IPR: Date FOC offered: NA  Date FOC received:   Accepting facility:   Date authorization started with reference number:   Date authorization received and expires: Follow up appointments: pcp/specialist   DME needed: None  Transportation at discharge: The patient will drive self home  IM/IMM Medicare/ letter given: 09/21/2023  Is patient a  and connected with [facility]? NA   If yes, was [place] transfer form completed and VA notified? Caregiver Contact: Cassidy Galaviz [phone]  Discharge Caregiver contacted prior to discharge? Patient will call  Care Conference needed? None  Barriers to discharge: None     09/22/23 1234   Services At/After Discharge   Transition of Care Consult (CM Consult) N/A   Internal Home Health No   Services At/After Discharge None   Mode of Transport at Discharge Self   Confirm Follow Up Transport Self       The patient drove self to the hospital and plans on driving himself at discharge. He denies the need for a resumption of care with [facility] health. He lives alone in a house and agudelo snot use any DMEs, denies the need for any. Care management will continue to be available to assist as transition of care planning needs arise.       Phill Ward RN  Case Management  [phone]

## 2023-09-23 LAB
EKG ATRIAL RATE: 72 BPM
EKG DIAGNOSIS: NORMAL
EKG P AXIS: 56 DEGREES
EKG P-R INTERVAL: 174 MS
EKG Q-T INTERVAL: 428 MS
EKG QRS DURATION: 86 MS
EKG QTC CALCULATION (BAZETT): 468 MS
EKG R AXIS: -4 DEGREES
EKG T AXIS: 134 DEGREES
EKG VENTRICULAR RATE: 72 BPM

## 2023-10-03 ENCOUNTER — TELEPHONE (OUTPATIENT)
Age: 73
End: 2023-10-03

## 2023-10-03 DIAGNOSIS — I10 PRIMARY HYPERTENSION: Primary | ICD-10-CM

## 2023-10-03 DIAGNOSIS — I42.2 HYPERTROPHIC CARDIOMYOPATHY (HCC): ICD-10-CM

## 2023-10-03 NOTE — TELEPHONE ENCOUNTER
Future Appointments:  Future Appointments   Date Time Provider [address]   12/5/2023 10:30 AM Michael Ryan MD [facility] BS AMB        Last Appointment With Me:  6/5/2023     Requested Prescriptions     Pending Prescriptions Disp Refills    isosorbide mononitrate (IMDUR) 60 MG extended release tablet 90 tablet 3     Sig: Take 1 tablet by mouth daily

## 2023-10-03 NOTE — TELEPHONE ENCOUNTER
isosorbide mononitrate (IMDUR) 60 MG extended release tablet    90 day refill  Liz[facility] Bucios #[phone]

## 2023-10-04 NOTE — TELEPHONE ENCOUNTER
I called the patient. He said his primary care provider took care of refilling his medications. Nothing for us to do.

## 2023-10-05 RX ORDER — ISOSORBIDE MONONITRATE 60 MG/1
60 TABLET, EXTENDED RELEASE ORAL DAILY
Qty: 90 TABLET | Refills: 3 | Status: SHIPPED | OUTPATIENT
Start: 2023-10-05

## 2023-10-19 ENCOUNTER — TELEPHONE (OUTPATIENT)
Age: 73
End: 2023-10-19

## 2023-10-19 DIAGNOSIS — E78.2 MIXED HYPERLIPIDEMIA: ICD-10-CM

## 2023-10-19 DIAGNOSIS — I10 PRIMARY HYPERTENSION: Primary | ICD-10-CM

## 2023-10-19 NOTE — TELEPHONE ENCOUNTER
Future Appointments:  Future Appointments   Date Time Provider [address]   2023 10:30 AM Baron Barbara MD [facility] BS AMB        Last Appointment With Me:  2023     Requested Prescriptions     Pending Prescriptions Disp Refills    lovastatin (MEVACOR) 40 MG tablet 90 tablet 1     Sig: Take 1 tablet by mouth daily    levothyroxine (SYNTHROID) 50 MCG tablet 90 tablet 1     Sig: Take 1 tablet by mouth every morning (before breakfast)    losartan (COZAAR) 50 MG tablet 90 tablet 1     Si tablet daily [facility] Consult    Referring Provider: Precious Izquierdo MD   PCP: Toshia Mitchell APRN    Reason for Consultation: Coffee ground emesis     Chief complaint: Vomiting     History of present illness:    Timothy Guzman is a 47 y.o. female who is admitted with coffee ground emesis.  She describes nausea and vomiting for three days.   She has chronic right upper quadrant pain that is worsened by movement.  This is thought to be myofascial pain or pain secondary to fatty liver.  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    She was admitted last month for similar presentation and underwent an EGD on 4/7/22 that showed mild gastritis.  She had acute kidney injury at that time secondary to overdiuresis.   It was recommended her diuretic regimen be decreased from 300 mg Spironolactone daily to 100 mg daily and 6 mg Bumex daily to 2 mg daily.   She however states these were increased by her primary care physician after discharge.    Her H&H is within normal limits at 13 and 37.   A CT abdomen/pelvis is unremarkable for acute process.  No ascites present.       She states she recently had an abnormal mammogram and is awaiting outpatient follow up breast ultrasound later this month.   She expresses significant worry about this as her mother had breast cancer.      Allergies:  Contrast dye, Haloperidol, Nsaids, and Tylenol [acetaminophen]    Scheduled Meds:  bumetanide, 1.5 mg, Oral, BID  FLUoxetine, 40 mg, Oral, Daily  gabapentin, 400 mg, Oral, TID  lactulose, 30 g, Oral, TID  lubiprostone, 24 mcg, Oral, BID  magnesium oxide, 400 mg, Oral, Q PM  melatonin, 10 mg, Oral, Nightly  OLANZapine, 10 mg, Oral, Nightly  potassium chloride, 20 mEq, Oral, Daily  ranolazine, 1,000 mg, Oral, BID  riFAXIMin, 550 mg, Oral, Q12H  rosuvastatin, 20 mg, Oral, Nightly  sodium chloride, 10 mL, Intravenous, Q12H  spironolactone, 100 mg, Oral,  Daily  tamsulosin, 0.4 mg, Oral, Daily  terazosin, 1 mg, Oral, Nightly  traZODone, 100 mg, Oral, Nightly  ursodiol, 300 mg, Oral, BID         Infusions:  octreotide (SandoSTATIN) infusion, 25 mcg/hr, Last Rate: 25 mcg/hr (05/09/22 0827)  pantoprazole, 8 mg/hr, Last Rate: 8 mg/hr (05/09/22 0332)        PRN Meds:  HYDROmorphone  •  ondansetron  •  polyethylene glycol  •  potassium chloride **OR** potassium chloride **OR** potassium chloride  •  sodium chloride  •  sodium chloride    Home Meds:  Medications Prior to Admission   Medication Sig Dispense Refill Last Dose   • bumetanide (BUMEX) 0.5 MG tablet Take 3 tablets by mouth 2 (Two) Times a Day for 30 days. (Patient taking differently: Take 2 mg by mouth 3 (Three) Times a Day.) 180 tablet 0 Patient Taking Differently at Unknown time   • aspirin 81 MG EC tablet Take 81 mg by mouth Daily.      • B Complex Vitamins (vitamin b complex) capsule capsule Take 1 capsule by mouth Daily.      • bisacodyl (DULCOLAX) 10 MG suppository Insert 1 suppository into the rectum Daily As Needed for Constipation. 30 suppository 0    • clopidogrel (PLAVIX) 75 MG tablet Take 1 tablet by mouth Daily. 90 tablet 3    • docusate sodium (Colace) 100 MG capsule Take 1 capsule by mouth 2 (Two) Times a Day. 60 capsule 0    • FLUoxetine (PROzac) 40 MG capsule Take 1 capsule by mouth Daily for 30 days. 30 capsule 0    • gabapentin (NEURONTIN) 400 MG capsule Take 1 capsule by mouth 3 (Three) Times a Day. 90 capsule 5    • lactulose (CHRONULAC) 10 GM/15ML solution Take 45 mL by mouth 3 (Three) Times a Day. 1892 mL 0    • lubiprostone (AMITIZA) 24 MCG capsule Take 1 capsule by mouth 2 (Two) Times a Day.      • magnesium oxide (MAGOX) 400 (241.3 Mg) MG tablet tablet Take 400 mg by mouth Every Evening.      • Melatonin 10 MG tablet Take 1 tablet by mouth Every Night.      • Multivitamin tablet tablet Daily.      • OLANZapine (zyPREXA) 10 MG tablet Take 1 tablet by mouth Every Night.      • ondansetron  (Zofran) 4 MG tablet Take 1 tablet by mouth Every 8 (Eight) Hours As Needed for Nausea or Vomiting for up to 30 doses. 15 tablet 0    • pantoprazole (PROTONIX) 40 MG EC tablet Take 1 tablet by mouth 2 (Two) Times a Day Before Meals. 28 tablet 0    • polyethylene glycol (MIRALAX) 17 g packet Take 17 g by mouth 2 (Two) Times a Day. (Patient taking differently: Take 17 g by mouth Daily As Needed.) 60 packet 0    • potassium chloride (K-DUR,KLOR-CON) 20 MEQ CR tablet Take 1 tablet by mouth Daily.      • prazosin (MINIPRESS) 1 MG capsule Take 1 capsule by mouth Daily.      • ranolazine (RANEXA) 1000 MG 12 hr tablet Take 1 tablet by mouth 2 (Two) Times a Day for 30 days. 60 tablet 0    • rosuvastatin (CRESTOR) 20 MG tablet Take 1 tablet by mouth Every Night. 90 tablet 3    • spironolactone (ALDACTONE) 100 MG tablet Take 1 tablet by mouth Daily. (Patient taking differently: Take 100 mg by mouth 3 (Three) Times a Day.) 30 tablet 0    • tamsulosin (FLOMAX) 0.4 MG capsule 24 hr capsule Take 1 capsule by mouth Daily. 30 capsule 0    • traZODone (DESYREL) 50 MG tablet Take 1 tablet by mouth Every Night for 30 days. (Patient taking differently: Take 100 mg by mouth Every Night. Pt reports increase of trazodone to 100mg nightly) 30 tablet 0    • ursodiol (ACTIGALL) 300 MG capsule Take 300 mg by mouth 2 (Two) Times a Day.      • vitamin B-6 (PYRIDOXINE) 25 MG tablet Take 25 mg by mouth Daily.      • Xifaxan 550 MG tablet Take 1 tablet by mouth Every 12 (Twelve) Hours. 180 tablet 3        ROS: Review of Systems   Constitutional: Positive for fatigue.   HENT: Negative.    Eyes: Negative.    Respiratory: Negative.    Cardiovascular: Negative.    Gastrointestinal: Positive for abdominal pain, nausea and vomiting.   Endocrine: Negative.    Genitourinary: Negative.    Musculoskeletal: Negative.    Skin: Negative.    Allergic/Immunologic: Negative.    Neurological: Negative.    Hematological: Negative.    Psychiatric/Behavioral:  Negative.        PAST MED HX:  Past Medical History:   Diagnosis Date   • Alcoholism (HCC)     sober 2.5 years   • Anaphylaxis    • Arthritis    • Bone necrosis (HCC)    • CAD (coronary artery disease) 2021   • Cardiac arrest (HCC)    • Cirrhosis of liver (HCC) 2021   • Gastroparesis    • GERD (gastroesophageal reflux disease)    • History of esophageal varices    • History of kidney stones    • Hypertension    • Memory loss    • Neuropathy    • Pancreatitis    • Rib fractures 2021   • SVT (supraventricular tachycardia) (HCC) 2021       PAST SURG HX:  Past Surgical History:   Procedure Laterality Date   • ANKLE SURGERY Right    • APPENDECTOMY     • CARDIAC CATHETERIZATION N/A 2021    Procedure: Left Heart Cath;  Surgeon: Vikram Gonzales MD;  Location:  [facility];  Service: Cardiovascular;  Laterality: N/A;   • CARDIAC CATHETERIZATION N/A 7/15/2021    Procedure: LEFT HEART CATH;  Surgeon: Vikram Gonzales MD;  Location:  [facility];  Service: Cardiology;  Laterality: N/A;   •  SECTION      x2   • CHOLECYSTECTOMY     • COLONOSCOPY     • COLONOSCOPY N/A 3/31/2020    Procedure: COLONOSCOPY;  Surgeon: Tirso Giles MD;  Location:  [facility];  Service: Gastroenterology;  Laterality: N/A;   • COLONOSCOPY N/A 2021    Procedure: COLONOSCOPY;  Surgeon: Tyrese Rasmussen MD;  Location:  [facility];  Service: Gastroenterology;  Laterality: N/A;   • CORONARY STENT PLACEMENT     • ENDOSCOPY     • ENDOSCOPY     • ENDOSCOPY N/A 2021    Procedure: ESOPHAGOGASTRODUODENOSCOPY AT BEDSIDE;  Surgeon: Tyrese Rasmussen MD;  Location:  [facility];  Service: Gastroenterology;  Laterality: N/A;   • ENDOSCOPY N/A 2021    Procedure: ESOPHAGOGASTRODUODENOSCOPY;  Surgeon: Tyrese Rasmussen MD;  Location:  [facility];  Service: Gastroenterology;  Laterality: N/A;   • ENDOSCOPY N/A 2021    Procedure: ESOPHAGOGASTRODUODENOSCOPY;   "Surgeon: Tyrese Rasmussen MD;  Location:  [facility];  Service: Gastroenterology;  Laterality: N/A;   • ENDOSCOPY N/A 2022    Procedure: ESOPHAGOGASTRODUODENOSCOPY;  Surgeon: Tyrese Rasmussen MD;  Location:  [facility];  Service: Gastroenterology;  Laterality: N/A;   • REPLACEMENT TOTAL HIP LATERAL POSITION Left    • TOTAL HIP ARTHROPLASTY Right     x2   • TOTAL KNEE ARTHROPLASTY Left        FAM HX:  Family History   Problem Relation Age of Onset   • Diabetes type II Mother    • Breast cancer Mother    • Heart disease Father    • Liver disease Brother    • Hypertension Brother    • ADD / ADHD Child    • Autism Child    • Asperger's syndrome Child    • Colon cancer Neg Hx    • Colon polyps Neg Hx        SOC HX:  Social History     Socioeconomic History   • Marital status:    Tobacco Use   • Smoking status: Former Smoker     Packs/day: 0.50     Types: Electronic Cigarette, Cigarettes     Quit date: 2021     Years since quittin.8   • Smokeless tobacco: Never Used   Vaping Use   • Vaping Use: Former   Substance and Sexual Activity   • Alcohol use: Not Currently     Comment: SOBER 3 YEARS   • Drug use: No   • Sexual activity: Defer       PHYSICAL EXAM  BP (!) 88/53 (BP Location: Right arm, Patient Position: Lying)   Pulse 68   Temp 97.7 °F (36.5 °C) (Oral)   Resp 18   Ht 160 cm (63\")   Wt 88.9 kg (196 lb)   SpO2 94%   BMI 34.72 kg/m²   Wt Readings from Last 3 Encounters:   22 88.9 kg (196 lb)   22 102 kg (224 lb)   22 102 kg (224 lb 11.2 oz)   ,body mass index is 34.72 kg/m².  Physical Exam  Constitutional:       General: She is not in acute distress.     Appearance: She is obese. She is not ill-appearing or toxic-appearing.   HENT:      Head: Normocephalic and atraumatic.      Mouth/Throat:      Mouth: Mucous membranes are moist.   Eyes:      General: No scleral icterus.  Cardiovascular:      Rate and Rhythm: Normal rate and regular rhythm.   Pulmonary:      " Effort: Pulmonary effort is normal.      Breath sounds: Normal breath sounds.   Abdominal:      General: Bowel sounds are normal. There is no distension.      Palpations: Abdomen is soft.      Tenderness: There is abdominal tenderness in the right upper quadrant.   Musculoskeletal:      Right lower leg: No edema.      Left lower leg: No edema.   Skin:     General: Skin is warm and dry.   Neurological:      Mental Status: She is alert and oriented to person, place, and time.   Psychiatric:         Behavior: Behavior normal.       Results Review:   I reviewed the patient's new clinical results.    Lab Results   Component Value Date    WBC 6.92 05/08/2022    HGB 13.0 05/09/2022    HGB 13.4 05/09/2022    HGB 13.5 05/08/2022    HCT 37.1 05/09/2022    MCV 88.6 05/08/2022     (L) 05/08/2022       Lab Results   Component Value Date    INR 1.09 05/08/2022    INR 1.08 04/08/2022    INR 1.08 04/07/2022       Lab Results   Component Value Date    GLUCOSE 173 (H) 05/09/2022    BUN 13 05/09/2022    CREATININE 0.94 05/09/2022    EGFRIFNONA 65 02/02/2022    BCR 13.8 05/09/2022     (L) 05/09/2022    K 4.6 05/09/2022    CO2 19.0 (L) 05/09/2022    CALCIUM 8.4 (L) 05/09/2022    PROTENTOTREF 7.5 09/30/2021    ALBUMIN 4.40 05/08/2022    ALKPHOS 102 05/08/2022    BILITOT 0.5 05/08/2022    BILIDIR 0.2 06/23/2021    ALT 29 05/08/2022    AST 26 05/08/2022     CT Abdomen/Pelvis (as interpreted by radiologist)  FINDINGS:   No evidence of pneumonia or other acute process in the lung bases. No  pleural effusion. Left lower lobe subcentimeter nodule again noted, also  present on a remote CT from March 2020   No ureteral stone or hydronephrosis of either kidney. No evidence of  pancreatitis or other acute noncontrast findings of the organs  throughout the abdomen. Cholecystomy again noted.   No bowel obstruction. No evidence of colitis or appendicitis. No  diverticulitis.   No abscess or free air. No significant free fluid.   No  evidence of acute processes in the pelvis. Bladder appears within  normal limits.   No fracture or other acute osseous findings. No acute superficial soft  tissue abnormality.    IMPRESSION:   Negative CT examination as above. No evidence of acute process in the  Abdomen/pelvis.    I reviewed her pertinent previous medical records including most recent EGD on 4/7/2022 that showed mild gastritis with hemorrhage, likely contribution of portal gastropathy. Antrum biopsy was negative for H. Pylori.    I also reviewed her most recent hospitalization to our facility last month where she had acute kidney injury secondary to overdiuresis.   Her diuretic regimen was decreased to Aldactone 100 mg daily and Lasix 40 mg daily.     ASSESSMENTS/PLANS    1.  Hematemesis of coffee grounds  2.  History of mild gastritis with hemorrhage (4/7/2022)  3.  Chronic abdominal pain, likely secondary to fatty liver with recent weight gain.     4.  Alcohol cirrhosis without ascites.  MELD-NA is 14.   She has not drank alcohol in 2 -1/2 years.   5. Abnormal mammogram    Ms. Guzman is a 47 year old female known to our service for alcohol cirrhosis that presents with coffee ground emesis.  She has not drank alcohol in 2 1/2 years.  Her H&H is within normal limits and recent EGD last month showed mild gastritis.   No varices were seen.  She has chronic abdominal pain and is requesting IV pain medications multiple times in our encounter.    >>> Discontinue IV Octreotide drip  >>> Discontinue IV Protonix drip.  Change to 40 mg BID  >>> Hold diuretics as she is hypotensive this morning.  Again discussed concern of overdiuresis as she states she is still taking Spironolactone 300 mg daily and 6 mg Bumex daily.     >>> Add Acetaminophen 500 mg q6h prn.   She has Acetaminophen listed as an allergy as she states she was told previously not to take this due to her underlying liver disease.  I however informed her that it is okay to take Acetaminophen as  needed up to 2,000 mg daily in cirrhosis.  >>> Recommend discontinuing opiate pain medication   >>> We discussed weight loss of 10 % of body weight for fatty liver (~ 19lbs)  >>> Clear liquid diet.   >>> Outpatient follow up has been arranged for her abnormal mammogram.  Expressed empathy and support as patient has great worry for breast cancer.      I discussed the patient's findings and my recommendations with patient    SYED Ryan  05/09/22  08:33 EDT

## 2023-10-19 NOTE — TELEPHONE ENCOUNTER
levothyroxine (SYNTHROID) 50 MCG tablet    losartan (COZAAR) 50 MG tablet          90 day script refill to [facility] #[phone]

## 2023-10-20 DIAGNOSIS — I10 PRIMARY HYPERTENSION: ICD-10-CM

## 2023-10-20 RX ORDER — LOVASTATIN 40 MG/1
40 TABLET ORAL DAILY
Qty: 90 TABLET | Refills: 1 | Status: SHIPPED | OUTPATIENT
Start: 2023-10-20

## 2023-10-20 RX ORDER — LOSARTAN POTASSIUM 50 MG/1
TABLET ORAL
Qty: 90 TABLET | Refills: 1 | Status: SHIPPED | OUTPATIENT
Start: 2023-10-20

## 2023-10-20 RX ORDER — LEVOTHYROXINE SODIUM 0.05 MG/1
50 TABLET ORAL
Qty: 90 TABLET | Refills: 1 | Status: SHIPPED | OUTPATIENT
Start: 2023-10-20

## 2023-10-20 NOTE — TELEPHONE ENCOUNTER
Pt states the Lovastatin that was incorrectly sent to [facility] needs to go to [facility]. Pt states he specifically asked for this to go this way. He is asking why it didn't? Please call pt. Thanks.

## 2023-10-23 RX ORDER — LOSARTAN POTASSIUM 50 MG/1
TABLET ORAL
Qty: 90 TABLET | Refills: 1 | Status: SHIPPED | OUTPATIENT
Start: 2023-10-23

## 2023-11-21 RX ORDER — CARVEDILOL 6.25 MG/1
6.25 TABLET ORAL 2 TIMES DAILY
Qty: 180 TABLET | Refills: 3 | Status: SHIPPED | OUTPATIENT
Start: 2023-11-21

## 2023-11-21 NOTE — TELEPHONE ENCOUNTER
carvedilol (COREG) 6.25 MG tablet 2/day   90 day for a quantity of #180    Federico Barrios #[phone]

## 2023-11-21 NOTE — TELEPHONE ENCOUNTER
Future Appointments:  Future Appointments   Date Time Provider [address]   12/5/2023 10:30 AM Abhijit Goodrich MD [facility] BS AMB        Last Appointment With Me:  6/5/2023     Requested Prescriptions     Pending Prescriptions Disp Refills    carvedilol (COREG) 6.25 MG tablet 180 tablet 0     Sig: Take 1 tablet by mouth 2 times daily

## 2023-12-05 ENCOUNTER — OFFICE VISIT (OUTPATIENT)
Age: 73
End: 2023-12-05
Payer: MEDICARE

## 2023-12-05 VITALS
BODY MASS INDEX: 32.65 KG/M2 | WEIGHT: 208 LBS | DIASTOLIC BLOOD PRESSURE: 66 MMHG | HEIGHT: 67 IN | SYSTOLIC BLOOD PRESSURE: 117 MMHG | OXYGEN SATURATION: 95 % | HEART RATE: 56 BPM | RESPIRATION RATE: 18 BRPM | TEMPERATURE: 98 F

## 2023-12-05 DIAGNOSIS — I42.2 HYPERTROPHIC CARDIOMYOPATHY (HCC): ICD-10-CM

## 2023-12-05 DIAGNOSIS — Z11.59 ENCOUNTER FOR HEPATITIS C SCREENING TEST FOR LOW RISK PATIENT: ICD-10-CM

## 2023-12-05 DIAGNOSIS — Z00.00 ENCOUNTER FOR ANNUAL WELLNESS VISIT (AWV) IN MEDICARE PATIENT: Primary | ICD-10-CM

## 2023-12-05 DIAGNOSIS — I48.19 PERSISTENT ATRIAL FIBRILLATION (HCC): ICD-10-CM

## 2023-12-05 DIAGNOSIS — E78.2 MIXED HYPERLIPIDEMIA: ICD-10-CM

## 2023-12-05 DIAGNOSIS — R73.9 BORDERLINE HYPERGLYCEMIA: ICD-10-CM

## 2023-12-05 DIAGNOSIS — E03.2 HYPOTHYROIDISM DUE TO MEDICATION: ICD-10-CM

## 2023-12-05 DIAGNOSIS — I10 PRIMARY HYPERTENSION: ICD-10-CM

## 2023-12-05 PROCEDURE — G8427 DOCREV CUR MEDS BY ELIG CLIN: HCPCS | Performed by: INTERNAL MEDICINE

## 2023-12-05 PROCEDURE — 1036F TOBACCO NON-USER: CPT | Performed by: INTERNAL MEDICINE

## 2023-12-05 PROCEDURE — 99214 OFFICE O/P EST MOD 30 MIN: CPT | Performed by: INTERNAL MEDICINE

## 2023-12-05 PROCEDURE — G0439 PPPS, SUBSEQ VISIT: HCPCS | Performed by: INTERNAL MEDICINE

## 2023-12-05 PROCEDURE — 1123F ACP DISCUSS/DSCN MKR DOCD: CPT | Performed by: INTERNAL MEDICINE

## 2023-12-05 PROCEDURE — G8484 FLU IMMUNIZE NO ADMIN: HCPCS | Performed by: INTERNAL MEDICINE

## 2023-12-05 PROCEDURE — G8417 CALC BMI ABV UP PARAM F/U: HCPCS | Performed by: INTERNAL MEDICINE

## 2023-12-05 PROCEDURE — 3078F DIAST BP <80 MM HG: CPT | Performed by: INTERNAL MEDICINE

## 2023-12-05 PROCEDURE — 3074F SYST BP LT 130 MM HG: CPT | Performed by: INTERNAL MEDICINE

## 2023-12-05 PROCEDURE — 3017F COLORECTAL CA SCREEN DOC REV: CPT | Performed by: INTERNAL MEDICINE

## 2023-12-05 NOTE — PROGRESS NOTES
1. \"Have you been to the ER, urgent care clinic since your last visit? Hospitalized since your last visit? \" Yes ER, [facility] in [place], [place] 09/23 for congestive heart failure    2. \"Have you seen or consulted any other health care providers outside of the [address] [place] [address] since your last visit? \" Yes [facility]      3. For patients aged 43-73: Has the patient had a colonoscopy / FIT/ Cologuard? Yes - no Care Gap present      If the patient is female:    4. For patients aged 43-66: Has the patient had a mammogram within the past 2 years? NA - based on age or sex      11. For patients aged 21-65: Has the patient had a pap smear?  NA - based on age or sex
This is the Subsequent Medicare Annual Wellness Exam, performed 12 months or more after the Initial AWV or the last Subsequent AWV    I have reviewed the patient's medical history in detail and updated the computerized patient record. Assessment/Plan   Education and counseling provided:  Are appropriate based on today's review and evaluation         Depression Risk Factor Screening         9/6/2023    11:09 AM 6/5/2023    10:42 AM 3/30/2022    10:09 AM   PHQ-9    Little interest or pleasure in doing things 0 0 0   Feeling down, depressed, or hopeless 0 0 0   PHQ-2 Score 0 0 0   PHQ-9 Total Score 0 0 0         Alcohol & Drug Abuse Risk Screen    Do you average more than 1 drink per night or more than 7 drinks a week:  No    On any one occasion in the past three months have you have had more than 3 drinks containing alcohol:  No    Occasionally will have 2-3 drink. Functional Ability and Level of Safety    Hearing: The patient has hearing aids. Lives in lower level of 2 story home. He is able to use stairs with no trouble. Activities of Daily Living: The home contains: no safety equipment. Patient needs help with: None--independent for all ADL's      Ambulation: Without difficulty. Fall Risk:      6/5/2023    10:42 AM   Amb Fall Risk Assessment and TUG Test   Do you feel unsteady or are you worried about falling?  no   2 or more falls in past year? no   Fall with injury in past year? no   Timed Up and Go Test > 12 seconds?  no        Abuse Screen:  Patient is not abused       Cognitive Screening    Has your family/caregiver stated any concerns about your memory: no     Cognitive Screening: normal    Health Maintenance Due     Health Maintenance Due   Topic Date Due    Pneumococcal 65+ years Vaccine (1 - PCV) Never done    Hepatitis C screen  Never done    DTaP/Tdap/Td vaccine (1 - Tdap) Never done    Shingles vaccine (1 of 2) Never done    Respiratory Syncytial Virus (RSV) age 61 yrs+ (1
(acute kidney injury) ([address]) 8/31/2023    Arrhythmia     a fib    Atrial fibrillation ([address])     Cancer ([address])     skin, throat cancer    Congestive heart failure ([address]) 11/2020    hosp at [facility]    G tube feedings ([address])     PEG    High cholesterol     Hypertension     Hypertrophic cardiomyopathy ([address])     per cardiology notes 6/2013    Long term current use of anticoagulant therapy     MI (myocardial infarction) ([address])     per pt, was told he had MI over 10 years    Status post chemoradiation     completed 12/2013    Throat cancer ([address])     Viral meningitis        Past Surgical History:   Procedure Laterality Date    CARDIAC CATHETERIZATION  2007    CARDIAC ELECTROPHYSIOLOGY STUDY AND ABLATION  2017    CARDIAC SURGERY N/A 8/28/2023    TRANSPERICARDIAL HYBRID ABLATION WITH FLUORO, LEFT ATRIAL APPENDAGE CLIP VIA LEFT VATS, JOHN BY DR YO (REQ HYBRID) performed by Sheri Durant MD at [address] [place] [address] E N/A 1/17/2017    COLONOSCOPY performed by Jose E Hogan MD at [facility] ENDOSCOPY    EGD BALLOON DILATION ESOPHAGUS <30 MM DIAM  11/8/2013         EP DEVICE PROCEDURE N/A 8/28/2023    Ablation A-fib w complete ep study performed by Emily Albert MD at [address] [place] [address]. hand surgery    OTHER SURGICAL HISTORY      exc. skin cancer    OTHER SURGICAL HISTORY      peg tube inserted    TONSILLECTOMY      UPPER GASTROINTESTINAL ENDOSCOPY N/A 8/28/2023    ESOPHAGOGASTRODUDENOSCOPY performed by Lauro Field MD at [facility] OPEN HEART       All: He has No Known Allergies.      Current Outpatient Medications   Medication Sig    carvedilol (COREG) 6.25 MG tablet Take 1 tablet by mouth 2 times daily    losartan (COZAAR) 50 MG tablet 1 tablet daily    lovastatin (MEVACOR) 40 MG tablet Take 1 tablet by mouth daily    isosorbide mononitrate (IMDUR) 60 MG extended release tablet Take 1 tablet by mouth daily    dilTIAZem (DILACOR XR) 240 MG extended release capsule Take 1 capsule by mouth daily

## 2023-12-05 NOTE — PATIENT INSTRUCTIONS
Medicare Wellness Visit  The best way to live healthy is to have a lifestyle where you eat a well-balanced diet, exercise regularly, limit alcohol use,  and quit all forms of tobacco/nicotine, if applicable. Regular preventive services are another way to keep healthy. Preventive services (vaccines, screening tests, monitoring &  exams) can help personalize your care plan, which helps you manage your own care. Screening tests can find health  problems at the earliest stages, when they are easiest to treat. [facility] follows the current, evidence-based guidelines published by the [place]eral[place] (USPSTF) when recommending preventive services for our patients. Because we follow  these guidelines, sometimes recommendations change over time as research supports it. (For example, a prostate  screening blood test is no longer routinely recommended for men with no symptoms). Of course, you and your doctor  may decide to screen more often for some diseases, based on your risk and co-morbidities (chronic disease you are  already diagnosed with). Preventive services for you include:  - Medicare offers their members a free annual wellness visit, which is time for you and your primary care provider to  discuss and plan for your preventive service needs. Take advantage of this benefit every year!  -All adults over age 72 should receive the recommended pneumonia vaccines. Current USPSTF guidelines recommend a  series of two vaccines for the best pneumonia protection.  -All adults should have a flu vaccine yearly and tetanus vaccine every 10 years.  -All adults age 48 and older should receive the shingles vaccines (series of two vaccines).   -All adults age 44-73 who are overweight should have a diabetes screening test once every three years.  -Other screening tests & preventive services for persons with diabetes include: an eye exam to screen for diabetic  retinopathy, a

## 2023-12-07 ENCOUNTER — NURSE ONLY (OUTPATIENT)
Age: 73
End: 2023-12-07

## 2023-12-07 DIAGNOSIS — E03.2 HYPOTHYROIDISM DUE TO MEDICATION: ICD-10-CM

## 2023-12-07 DIAGNOSIS — Z11.59 ENCOUNTER FOR HEPATITIS C SCREENING TEST FOR LOW RISK PATIENT: ICD-10-CM

## 2023-12-07 DIAGNOSIS — R73.9 BORDERLINE HYPERGLYCEMIA: ICD-10-CM

## 2023-12-07 DIAGNOSIS — I10 PRIMARY HYPERTENSION: ICD-10-CM

## 2023-12-07 DIAGNOSIS — E78.2 MIXED HYPERLIPIDEMIA: ICD-10-CM

## 2023-12-07 LAB
ALBUMIN SERPL-MCNC: 3.6 G/DL (ref 3.5–5)
ALBUMIN/GLOB SERPL: 1.2 (ref 1.1–2.2)
ALP SERPL-CCNC: 67 U/L (ref 45–117)
ALT SERPL-CCNC: 18 U/L (ref 12–78)
ANION GAP SERPL CALC-SCNC: 6 MMOL/L (ref 5–15)
AST SERPL-CCNC: 16 U/L (ref 15–37)
BASOPHILS # BLD: 0 K/UL (ref 0–0.1)
BASOPHILS NFR BLD: 0 % (ref 0–1)
BILIRUB SERPL-MCNC: 0.5 MG/DL (ref 0.2–1)
BUN SERPL-MCNC: 29 MG/DL (ref 6–20)
BUN/CREAT SERPL: 20 (ref 12–20)
CALCIUM SERPL-MCNC: 8.8 MG/DL (ref 8.5–10.1)
CHLORIDE SERPL-SCNC: 109 MMOL/L (ref 97–108)
CHOLEST SERPL-MCNC: 168 MG/DL
CO2 SERPL-SCNC: 27 MMOL/L (ref 21–32)
CREAT SERPL-MCNC: 1.44 MG/DL (ref 0.7–1.3)
DIFFERENTIAL METHOD BLD: NORMAL
EOSINOPHIL # BLD: 0.2 K/UL (ref 0–0.4)
EOSINOPHIL NFR BLD: 3 % (ref 0–7)
ERYTHROCYTE [DISTWIDTH] IN BLOOD BY AUTOMATED COUNT: 12.9 % (ref 11.5–14.5)
EST. AVERAGE GLUCOSE BLD GHB EST-MCNC: 108 MG/DL
GLOBULIN SER CALC-MCNC: 3.1 G/DL (ref 2–4)
GLUCOSE SERPL-MCNC: 110 MG/DL (ref 65–100)
HBA1C MFR BLD: 5.4 % (ref 4–5.6)
HCT VFR BLD AUTO: 40.4 % (ref 36.6–50.3)
HCV AB SER IA-ACNC: 0.15 INDEX
HCV AB SERPL QL IA: NONREACTIVE
HDLC SERPL-MCNC: 52 MG/DL
HDLC SERPL: 3.2 (ref 0–5)
HGB BLD-MCNC: 12.9 G/DL (ref 12.1–17)
IMM GRANULOCYTES # BLD AUTO: 0 K/UL (ref 0–0.04)
IMM GRANULOCYTES NFR BLD AUTO: 0 % (ref 0–0.5)
LDLC SERPL CALC-MCNC: 107.4 MG/DL (ref 0–100)
LYMPHOCYTES # BLD: 1 K/UL (ref 0.8–3.5)
LYMPHOCYTES NFR BLD: 16 % (ref 12–49)
MCH RBC QN AUTO: 30.9 PG (ref 26–34)
MCHC RBC AUTO-ENTMCNC: 31.9 G/DL (ref 30–36.5)
MCV RBC AUTO: 96.7 FL (ref 80–99)
MONOCYTES # BLD: 0.7 K/UL (ref 0–1)
MONOCYTES NFR BLD: 11 % (ref 5–13)
NEUTS SEG # BLD: 4.4 K/UL (ref 1.8–8)
NEUTS SEG NFR BLD: 70 % (ref 32–75)
NRBC # BLD: 0 K/UL (ref 0–0.01)
NRBC BLD-RTO: 0 PER 100 WBC
PLATELET # BLD AUTO: 279 K/UL (ref 150–400)
PMV BLD AUTO: 10.8 FL (ref 8.9–12.9)
POTASSIUM SERPL-SCNC: 4.5 MMOL/L (ref 3.5–5.1)
PROT SERPL-MCNC: 6.7 G/DL (ref 6.4–8.2)
RBC # BLD AUTO: 4.18 M/UL (ref 4.1–5.7)
SODIUM SERPL-SCNC: 142 MMOL/L (ref 136–145)
T4 FREE SERPL-MCNC: 0.6 NG/DL (ref 0.8–1.5)
TRIGL SERPL-MCNC: 43 MG/DL
TSH SERPL DL<=0.05 MIU/L-ACNC: 16.9 UIU/ML (ref 0.36–3.74)
VLDLC SERPL CALC-MCNC: 8.6 MG/DL
WBC # BLD AUTO: 6.3 K/UL (ref 4.1–11.1)

## 2023-12-08 RX ORDER — LEVOTHYROXINE SODIUM 0.05 MG/1
50 TABLET ORAL DAILY
Qty: 90 TABLET | Refills: 1 | Status: SHIPPED | OUTPATIENT
Start: 2023-12-08

## 2023-12-15 RX ORDER — LEVOTHYROXINE SODIUM 0.05 MG/1
50 TABLET ORAL DAILY
Qty: 90 TABLET | Refills: 1 | Status: SHIPPED | OUTPATIENT
Start: 2023-12-15

## 2023-12-15 NOTE — TELEPHONE ENCOUNTER
Patient wants his levothyroxine medication sent to [facility] [address]     PCP: Seth Barclay MD    Last appt: 12/5/2023  Future Appointments   Date Time Provider [address] [place] [address]   6/5/2024 10:15 AM Seth Barclay MD [facility] BS AMB       Requested Prescriptions     Pending Prescriptions Disp Refills    levothyroxine (SYNTHROID) 50 MCG tablet 90 tablet 1     Sig: Take 1 tablet by mouth daily

## 2024-05-15 NOTE — TELEPHONE ENCOUNTER
PCP: Bg Walsh MD    Last appt: 12/5/2023  Future Appointments   Date Time Provider Department Center   6/5/2024 10:15 AM Bg Walsh MD [facility] BS AMB       Requested Prescriptions     Pending Prescriptions Disp Refills    levothyroxine (SYNTHROID) 50 MCG tablet 90 tablet 1     Sig: Take 1 tablet by mouth daily

## 2024-05-15 NOTE — TELEPHONE ENCOUNTER
PCP: Bg Walsh MD    Last appt: 12/5/2023  Future Appointments   Date Time Provider Department Center   6/5/2024 10:15 AM Bg Walsh MD [facility] BS AMB       Requested Prescriptions     Pending Prescriptions Disp Refills    levothyroxine (SYNTHROID) 50 MCG tablet 90 tablet 3     Sig: Take 1 tablet by mouth daily    carvedilol (COREG) 6.25 MG tablet 180 tablet 3     Sig: Take 1 tablet by mouth 2 times daily

## 2024-05-16 RX ORDER — CARVEDILOL 6.25 MG/1
6.25 TABLET ORAL 2 TIMES DAILY
Qty: 180 TABLET | Refills: 3 | Status: SHIPPED | OUTPATIENT
Start: 2024-05-16

## 2024-05-16 RX ORDER — LEVOTHYROXINE SODIUM 0.05 MG/1
50 TABLET ORAL DAILY
Qty: 90 TABLET | Refills: 3 | Status: SHIPPED | OUTPATIENT
Start: 2024-05-16

## 2024-05-28 ENCOUNTER — TELEPHONE (OUTPATIENT)
Age: 74
End: 2024-05-28

## 2024-05-28 NOTE — TELEPHONE ENCOUNTER
----- Message from Rasheeda Brewster MA sent at 5/28/2024  7:28 AM EDT -----  Regarding: FW: prescription refill  Contact: [phone]      ----- Message -----  From: Young Marie Jr  Sent: 5/25/2024   9:31 AM EDT  To: #  Subject: prescription refill                              I have changed prescription providers to [facility]. I need a refill on my levothyroxine. They need for you to contact them. They have reached out to your office on 5/14 and 5/24 with no response. Whoever is responsible for this is not doing their job. Please look into this and correct this problem.

## 2024-05-28 NOTE — TELEPHONE ENCOUNTER
Caller requests Refill of:  carvedilol (COREG) 6.25 MG tablet      Please send to:    [facility] - [place], [place] - [address] [place] [address] - P [phone] - F [phone] [address] [place] [id number]  Phone: [phone] Fax: [phone]         Visit / Appointment History:  Future Appointment at [facility]:  6/5/2024     Last Appointment With PCP:  12/5/2023       Caller confirmed instructions and dosages as correct.    Caller was advised that Meds will be refilled as soon as possible, however there can be a 48-72 business hour turn around on refill requests.

## 2024-05-28 NOTE — TELEPHONE ENCOUNTER
PCP: gB Walsh MD    Last appt:   12/5/2023    Future Appointments   Date Time Provider Department Center   6/5/2024 10:15 AM Bg Walsh MD [facility] BS AMB       Requested Prescriptions     Pending Prescriptions Disp Refills    levothyroxine (SYNTHROID) 50 MCG tablet 90 tablet 3     Sig: Take 1 tablet by mouth daily

## 2024-05-29 RX ORDER — LEVOTHYROXINE SODIUM 0.05 MG/1
50 TABLET ORAL DAILY
Qty: 90 TABLET | Refills: 3 | Status: SHIPPED | OUTPATIENT
Start: 2024-05-29

## 2024-06-05 ENCOUNTER — OFFICE VISIT (OUTPATIENT)
Age: 74
End: 2024-06-05
Payer: MEDICARE

## 2024-06-05 VITALS
BODY MASS INDEX: 32.36 KG/M2 | SYSTOLIC BLOOD PRESSURE: 134 MMHG | WEIGHT: 206.2 LBS | HEART RATE: 59 BPM | RESPIRATION RATE: 16 BRPM | DIASTOLIC BLOOD PRESSURE: 76 MMHG | HEIGHT: 67 IN | OXYGEN SATURATION: 95 % | TEMPERATURE: 98 F

## 2024-06-05 DIAGNOSIS — E03.2 HYPOTHYROIDISM DUE TO MEDICATION: Primary | ICD-10-CM

## 2024-06-05 DIAGNOSIS — I50.43 CHF (CONGESTIVE HEART FAILURE), NYHA CLASS I, ACUTE ON CHRONIC, COMBINED (HCC): ICD-10-CM

## 2024-06-05 DIAGNOSIS — E78.2 MIXED HYPERLIPIDEMIA: ICD-10-CM

## 2024-06-05 DIAGNOSIS — R73.9 BORDERLINE HYPERGLYCEMIA: ICD-10-CM

## 2024-06-05 DIAGNOSIS — I42.2 HYPERTROPHIC CARDIOMYOPATHY (HCC): ICD-10-CM

## 2024-06-05 DIAGNOSIS — I10 PRIMARY HYPERTENSION: ICD-10-CM

## 2024-06-05 PROBLEM — I48.91 A-FIB (HCC): Status: RESOLVED | Noted: 2023-08-28 | Resolved: 2024-06-05

## 2024-06-05 PROCEDURE — 99214 OFFICE O/P EST MOD 30 MIN: CPT | Performed by: INTERNAL MEDICINE

## 2024-06-05 PROCEDURE — 1123F ACP DISCUSS/DSCN MKR DOCD: CPT | Performed by: INTERNAL MEDICINE

## 2024-06-05 PROCEDURE — G8427 DOCREV CUR MEDS BY ELIG CLIN: HCPCS | Performed by: INTERNAL MEDICINE

## 2024-06-05 PROCEDURE — G8417 CALC BMI ABV UP PARAM F/U: HCPCS | Performed by: INTERNAL MEDICINE

## 2024-06-05 PROCEDURE — 3078F DIAST BP <80 MM HG: CPT | Performed by: INTERNAL MEDICINE

## 2024-06-05 PROCEDURE — 3017F COLORECTAL CA SCREEN DOC REV: CPT | Performed by: INTERNAL MEDICINE

## 2024-06-05 PROCEDURE — 3075F SYST BP GE 130 - 139MM HG: CPT | Performed by: INTERNAL MEDICINE

## 2024-06-05 PROCEDURE — 1036F TOBACCO NON-USER: CPT | Performed by: INTERNAL MEDICINE

## 2024-06-05 RX ORDER — LOVASTATIN 40 MG/1
40 TABLET ORAL DAILY
Qty: 90 TABLET | Refills: 3 | Status: SHIPPED | OUTPATIENT
Start: 2024-06-05

## 2024-06-05 RX ORDER — LEVOTHYROXINE SODIUM 0.05 MG/1
50 TABLET ORAL DAILY
Qty: 90 TABLET | Refills: 3 | Status: SHIPPED | OUTPATIENT
Start: 2024-06-05

## 2024-06-05 RX ORDER — LOSARTAN POTASSIUM 50 MG/1
TABLET ORAL
Qty: 90 TABLET | Refills: 3 | Status: SHIPPED | OUTPATIENT
Start: 2024-06-05

## 2024-06-05 SDOH — ECONOMIC STABILITY: INCOME INSECURITY: HOW HARD IS IT FOR YOU TO PAY FOR THE VERY BASICS LIKE FOOD, HOUSING, MEDICAL CARE, AND HEATING?: NOT HARD AT ALL

## 2024-06-05 SDOH — ECONOMIC STABILITY: FOOD INSECURITY: WITHIN THE PAST 12 MONTHS, YOU WORRIED THAT YOUR FOOD WOULD RUN OUT BEFORE YOU GOT MONEY TO BUY MORE.: NEVER TRUE

## 2024-06-05 SDOH — ECONOMIC STABILITY: FOOD INSECURITY: WITHIN THE PAST 12 MONTHS, THE FOOD YOU BOUGHT JUST DIDN'T LAST AND YOU DIDN'T HAVE MONEY TO GET MORE.: NEVER TRUE

## 2024-06-05 ASSESSMENT — PATIENT HEALTH QUESTIONNAIRE - PHQ9
SUM OF ALL RESPONSES TO PHQ QUESTIONS 1-9: 0
1. LITTLE INTEREST OR PLEASURE IN DOING THINGS: NOT AT ALL
SUM OF ALL RESPONSES TO PHQ9 QUESTIONS 1 & 2: 0
SUM OF ALL RESPONSES TO PHQ QUESTIONS 1-9: 0
SUM OF ALL RESPONSES TO PHQ QUESTIONS 1-9: 0
2. FEELING DOWN, DEPRESSED OR HOPELESS: NOT AT ALL
SUM OF ALL RESPONSES TO PHQ QUESTIONS 1-9: 0

## 2024-06-05 NOTE — PROGRESS NOTES
\"Have you been to the ER, urgent care clinic since your last visit?  Hospitalized since your last visit?\"    NO    “Have you seen or consulted any other health care providers outside of [facility] since your last visit?”    NO            Click Here for Release of Records Request      
above.    PMH:   Past Medical History:   Diagnosis Date    ABRAM (acute kidney injury) (HCC) 8/31/2023    Arrhythmia     a fib    Atrial fibrillation (HCC)     Cancer (HCC)     skin, throat cancer    Congestive heart failure (HCC) 11/2020    hosp at [facility]    G tube feedings (HCC)     PEG    High cholesterol     Hypertension     Hypertrophic cardiomyopathy (HCC)     per cardiology notes 6/2013    Long term current use of anticoagulant therapy     MI (myocardial infarction) (HCC)     per pt, was told he had MI over 10 years    Status post chemoradiation     completed 12/2013    Throat cancer (HCC)     Viral meningitis        Past Surgical History:   Procedure Laterality Date    CARDIAC CATHETERIZATION  2007    CARDIAC ELECTROPHYSIOLOGY STUDY AND ABLATION  2017    CARDIAC SURGERY N/A 8/28/2023    TRANSPERICARDIAL HYBRID ABLATION WITH FLUORO, LEFT ATRIAL APPENDAGE CLIP VIA LEFT VATS, JOHN BY DR YO (REQ HYBRID) performed by Neftali Burton MD at \[facility]\"" OPEN HEART    COLONOSCOPY N/A 1/17/2017    COLONOSCOPY performed by John Paul Baptiste MD at \[facility]\"" ENDOSCOPY    EGD BALLOON DILATION ESOPHAGUS <30 MM DIAM  11/8/2013         EP DEVICE PROCEDURE N/A 8/28/2023    Ablation A-fib w complete ep study performed by Jim Ware MD at \[facility]\"" CARDIAC CATH LAB    ORTHOPEDIC SURGERY      Rt. hand surgery    OTHER SURGICAL HISTORY      exc. skin cancer    OTHER SURGICAL HISTORY      peg tube inserted    TONSILLECTOMY      UPPER GASTROINTESTINAL ENDOSCOPY N/A 8/28/2023    ESOPHAGOGASTRODUDENOSCOPY performed by Madhav Arellano MD at \[facility]\"" OPEN HEART       All: He has No Known Allergies.     Current Outpatient Medications   Medication Sig    levothyroxine (SYNTHROID) 50 MCG tablet Take 1 tablet by mouth daily    carvedilol (COREG) 6.25 MG tablet Take 1 tablet by mouth 2 times daily    losartan (COZAAR) 50 MG tablet 1 tablet daily    lovastatin (MEVACOR) 40 MG tablet Take 1 tablet by mouth daily    isosorbide mononitrate (IMDUR) 60 MG extended

## 2024-06-07 ENCOUNTER — CARE COORDINATION (OUTPATIENT)
Dept: OTHER | Facility: CLINIC | Age: 74
End: 2024-06-07

## 2024-06-07 ENCOUNTER — NURSE ONLY (OUTPATIENT)
Age: 74
End: 2024-06-07

## 2024-06-07 DIAGNOSIS — E03.2 HYPOTHYROIDISM DUE TO MEDICATION: ICD-10-CM

## 2024-06-07 DIAGNOSIS — I10 PRIMARY HYPERTENSION: ICD-10-CM

## 2024-06-07 DIAGNOSIS — E78.2 MIXED HYPERLIPIDEMIA: ICD-10-CM

## 2024-06-07 DIAGNOSIS — R73.9 BORDERLINE HYPERGLYCEMIA: ICD-10-CM

## 2024-06-07 LAB
ALBUMIN SERPL-MCNC: 3.8 G/DL (ref 3.5–5)
ALBUMIN/GLOB SERPL: 1.2 (ref 1.1–2.2)
ALP SERPL-CCNC: 58 U/L (ref 45–117)
ALT SERPL-CCNC: 20 U/L (ref 12–78)
ANION GAP SERPL CALC-SCNC: 4 MMOL/L (ref 5–15)
AST SERPL-CCNC: 15 U/L (ref 15–37)
BASOPHILS # BLD: 0 K/UL (ref 0–0.1)
BASOPHILS NFR BLD: 1 % (ref 0–1)
BILIRUB SERPL-MCNC: 0.7 MG/DL (ref 0.2–1)
BUN SERPL-MCNC: 34 MG/DL (ref 6–20)
BUN/CREAT SERPL: 24 (ref 12–20)
CALCIUM SERPL-MCNC: 8.9 MG/DL (ref 8.5–10.1)
CHLORIDE SERPL-SCNC: 108 MMOL/L (ref 97–108)
CHOLEST SERPL-MCNC: 142 MG/DL
CO2 SERPL-SCNC: 30 MMOL/L (ref 21–32)
CREAT SERPL-MCNC: 1.42 MG/DL (ref 0.7–1.3)
DIFFERENTIAL METHOD BLD: NORMAL
EOSINOPHIL # BLD: 0.2 K/UL (ref 0–0.4)
EOSINOPHIL NFR BLD: 4 % (ref 0–7)
ERYTHROCYTE [DISTWIDTH] IN BLOOD BY AUTOMATED COUNT: 13.2 % (ref 11.5–14.5)
EST. AVERAGE GLUCOSE BLD GHB EST-MCNC: 108 MG/DL
GLOBULIN SER CALC-MCNC: 3.3 G/DL (ref 2–4)
GLUCOSE SERPL-MCNC: 101 MG/DL (ref 65–100)
HBA1C MFR BLD: 5.4 % (ref 4–5.6)
HCT VFR BLD AUTO: 42.1 % (ref 36.6–50.3)
HDLC SERPL-MCNC: 44 MG/DL
HDLC SERPL: 3.2 (ref 0–5)
HGB BLD-MCNC: 13.3 G/DL (ref 12.1–17)
IMM GRANULOCYTES # BLD AUTO: 0 K/UL (ref 0–0.04)
IMM GRANULOCYTES NFR BLD AUTO: 0 % (ref 0–0.5)
LDLC SERPL CALC-MCNC: 85.8 MG/DL (ref 0–100)
LYMPHOCYTES # BLD: 0.9 K/UL (ref 0.8–3.5)
LYMPHOCYTES NFR BLD: 18 % (ref 12–49)
MCH RBC QN AUTO: 30.1 PG (ref 26–34)
MCHC RBC AUTO-ENTMCNC: 31.6 G/DL (ref 30–36.5)
MCV RBC AUTO: 95.2 FL (ref 80–99)
MONOCYTES # BLD: 0.7 K/UL (ref 0–1)
MONOCYTES NFR BLD: 13 % (ref 5–13)
NEUTS SEG # BLD: 3.3 K/UL (ref 1.8–8)
NEUTS SEG NFR BLD: 64 % (ref 32–75)
NRBC # BLD: 0 K/UL (ref 0–0.01)
NRBC BLD-RTO: 0 PER 100 WBC
PLATELET # BLD AUTO: 217 K/UL (ref 150–400)
PMV BLD AUTO: 11.3 FL (ref 8.9–12.9)
POTASSIUM SERPL-SCNC: 4 MMOL/L (ref 3.5–5.1)
PROT SERPL-MCNC: 7.1 G/DL (ref 6.4–8.2)
RBC # BLD AUTO: 4.42 M/UL (ref 4.1–5.7)
SODIUM SERPL-SCNC: 142 MMOL/L (ref 136–145)
T4 FREE SERPL-MCNC: 0.7 NG/DL (ref 0.8–1.5)
TRIGL SERPL-MCNC: 61 MG/DL
TSH SERPL DL<=0.05 MIU/L-ACNC: 4.23 UIU/ML (ref 0.36–3.74)
VLDLC SERPL CALC-MCNC: 12.2 MG/DL
WBC # BLD AUTO: 5.2 K/UL (ref 4.1–11.1)

## 2024-06-07 RX ORDER — CARVEDILOL 6.25 MG/1
6.25 TABLET ORAL 2 TIMES DAILY
Qty: 180 TABLET | Refills: 3 | Status: SHIPPED | OUTPATIENT
Start: 2024-06-07

## 2024-06-07 NOTE — TELEPHONE ENCOUNTER
PCP: Bg Walsh MD    Last appt: 6/5/2024  Future Appointments   Date Time Provider Department Center   1/6/2025  9:15 AM Bg Walsh MD [facility] BS AMB       Requested Prescriptions     Pending Prescriptions Disp Refills    carvedilol (COREG) 6.25 MG tablet 180 tablet 3     Sig: Take 1 tablet by mouth 2 times daily

## 2024-06-07 NOTE — CARE COORDINATION
Ambulatory Care Coordination Note     2024 2:41 PM     Patient Current Location:  [place]     This patient was received as a referral from [facility] [employer] .    ACM contacted the patient by telephone. Verified name and  with patient as identifiers. Provided introduction to self, and explanation of the ACM role.   Patient declined care management services at this time.          ACM: JARRETT MENDIOLA RN     Challenges to be reviewed by the provider   Additional needs identified to be addressed with provider No  none               Method of communication with provider: none.      PCP/Specialist follow up:   Future Appointments         Provider Specialty Dept Phone    2025 9:15 AM Bg Walsh MD Internal Medicine [phone]            Follow Up:   No further Ambulatory Care Management follow-up scheduled at this time.  patient  has Ambulatory Care Manager's contact information for any further questions, concerns or needs.

## 2024-06-07 NOTE — CARE COORDINATION
Ambulatory Care Coordination Note     6/7/2024 1:42 PM     Patient Current Location:  [place]     Patient closed (patient declined) from the High Risk Care Management program on 6/7/2024.  No further Ambulatory Care Manager follow up scheduled.      ACM: KAYCE FARRIS RN     Challenges to be reviewed by the provider   Additional needs identified to be addressed with provider No  none               Method of communication with provider: none.    Care Summary Note: Call to patient to offer CM services. Patient reports no ACM needs at this. Patient is coordinated with PCP.    PCP/Specialist follow up:   Future Appointments         Provider Specialty Dept Phone    1/6/2025 9:15 AM Bg Walsh MD Internal Medicine [phone]            Follow Up:   No further Ambulatory Care Management follow-up scheduled at this time.  patient  has Ambulatory Care Manager's contact information for any further questions, concerns or needs.       Kayce Farris -[phone]

## 2024-06-13 DIAGNOSIS — E03.2 HYPOTHYROIDISM DUE TO MEDICATION: Primary | ICD-10-CM

## 2024-06-13 RX ORDER — LEVOTHYROXINE SODIUM 0.07 MG/1
75 TABLET ORAL DAILY
Qty: 90 TABLET | Refills: 1 | Status: SHIPPED | OUTPATIENT
Start: 2024-06-13

## 2024-06-13 NOTE — TELEPHONE ENCOUNTER
PCP: Bg Walsh MD    Last appt: 6/5/2024  Future Appointments   Date Time Provider Department Center   1/6/2025  9:15 AM Bg Walsh MD [facility] BS AMB       Requested Prescriptions     Pending Prescriptions Disp Refills    levothyroxine (SYNTHROID) 75 MCG tablet 30 tablet 2     Sig: Take 1 tablet by mouth Daily

## 2024-06-26 DIAGNOSIS — I10 PRIMARY HYPERTENSION: Primary | ICD-10-CM

## 2024-06-27 RX ORDER — CARVEDILOL 6.25 MG/1
6.25 TABLET ORAL 2 TIMES DAILY
Qty: 180 TABLET | Refills: 3 | Status: SHIPPED | OUTPATIENT
Start: 2024-06-27

## 2024-08-15 ENCOUNTER — TRANSCRIBE ORDERS (OUTPATIENT)
Facility: HOSPITAL | Age: 74
End: 2024-08-15

## 2024-08-15 DIAGNOSIS — R04.2 HEMOPTYSIS: Primary | ICD-10-CM

## 2024-08-19 ENCOUNTER — HOSPITAL ENCOUNTER (OUTPATIENT)
Facility: HOSPITAL | Age: 74
Discharge: HOME | End: 2024-08-22
Attending: INTERNAL MEDICINE
Payer: MEDICARE

## 2024-08-19 DIAGNOSIS — R04.2 HEMOPTYSIS: ICD-10-CM

## 2024-08-19 PROCEDURE — 6360000004 HC RX CONTRAST MEDICATION: Performed by: INTERNAL MEDICINE

## 2024-08-19 PROCEDURE — 71275 CT ANGIOGRAPHY CHEST: CPT

## 2024-08-19 RX ADMIN — IOPAMIDOL 100 ML: 755 INJECTION, SOLUTION INTRAVENOUS at 08:37

## 2024-09-09 ENCOUNTER — TELEPHONE (OUTPATIENT)
Age: 74
End: 2024-09-09

## 2024-09-09 DIAGNOSIS — E03.2 HYPOTHYROIDISM DUE TO MEDICATION: ICD-10-CM

## 2024-09-20 DIAGNOSIS — E03.2 HYPOTHYROIDISM DUE TO MEDICATION: ICD-10-CM

## 2024-09-20 RX ORDER — LEVOTHYROXINE SODIUM 75 UG/1
75 TABLET ORAL DAILY
Qty: 90 TABLET | Refills: 3 | Status: SHIPPED | OUTPATIENT
Start: 2024-09-20

## 2024-09-20 RX ORDER — LEVOTHYROXINE SODIUM 88 UG/1
88 TABLET ORAL DAILY
Qty: 90 TABLET | Refills: 3 | Status: SHIPPED | OUTPATIENT
Start: 2024-09-20 | End: 2024-09-20 | Stop reason: SDUPTHER

## 2024-11-21 ENCOUNTER — HOSPITAL ENCOUNTER (OUTPATIENT)
Facility: HOSPITAL | Age: 74
Discharge: HOME | End: 2024-11-21
Attending: INTERNAL MEDICINE
Payer: MEDICARE

## 2024-11-21 DIAGNOSIS — R04.2 HEMOPTYSIS: ICD-10-CM

## 2024-11-21 PROCEDURE — 71250 CT THORAX DX C-: CPT

## 2024-12-23 ENCOUNTER — HOSPITAL ENCOUNTER (INPATIENT)
Facility: HOSPITAL | Age: 74
LOS: 1 days | Discharge: HOME | DRG: 065 | End: 2024-12-24
Attending: EMERGENCY MEDICINE | Admitting: STUDENT IN AN ORGANIZED HEALTH CARE EDUCATION/TRAINING PROGRAM
Payer: MEDICARE

## 2024-12-23 ENCOUNTER — APPOINTMENT (OUTPATIENT)
Facility: HOSPITAL | Age: 74
DRG: 065 | End: 2024-12-23
Payer: MEDICARE

## 2024-12-23 DIAGNOSIS — I63.9 CEREBROVASCULAR ACCIDENT (CVA), UNSPECIFIED MECHANISM (HCC): ICD-10-CM

## 2024-12-23 DIAGNOSIS — I10 ESSENTIAL HYPERTENSION: ICD-10-CM

## 2024-12-23 DIAGNOSIS — H34.232 RETINAL ARTERY BRANCH OCCLUSION OF LEFT EYE: Primary | ICD-10-CM

## 2024-12-23 PROBLEM — I61.9 CVA (CEREBROVASCULAR ACCIDENT DUE TO INTRACEREBRAL HEMORRHAGE) (HCC): Status: ACTIVE | Noted: 2024-12-23

## 2024-12-23 LAB
ALBUMIN SERPL-MCNC: 3.9 G/DL (ref 3.5–5)
ALBUMIN/GLOB SERPL: 1.1 (ref 1.1–2.2)
ALP SERPL-CCNC: 66 U/L (ref 45–117)
ALT SERPL-CCNC: 22 U/L (ref 12–78)
ANION GAP SERPL CALC-SCNC: 3 MMOL/L (ref 2–12)
AST SERPL-CCNC: 19 U/L (ref 15–37)
BASOPHILS # BLD: 0 K/UL (ref 0–0.1)
BASOPHILS NFR BLD: 1 % (ref 0–1)
BILIRUB SERPL-MCNC: 0.6 MG/DL (ref 0.2–1)
BUN SERPL-MCNC: 27 MG/DL (ref 6–20)
BUN/CREAT SERPL: 18 (ref 12–20)
CALCIUM SERPL-MCNC: 9.4 MG/DL (ref 8.5–10.1)
CHLORIDE SERPL-SCNC: 106 MMOL/L (ref 97–108)
CO2 SERPL-SCNC: 31 MMOL/L (ref 21–32)
COMMENT:: NORMAL
CREAT SERPL-MCNC: 1.48 MG/DL (ref 0.7–1.3)
DIFFERENTIAL METHOD BLD: ABNORMAL
EOSINOPHIL # BLD: 0.2 K/UL (ref 0–0.4)
EOSINOPHIL NFR BLD: 4 % (ref 0–7)
ERYTHROCYTE [DISTWIDTH] IN BLOOD BY AUTOMATED COUNT: 13 % (ref 11.5–14.5)
GLOBULIN SER CALC-MCNC: 3.5 G/DL (ref 2–4)
GLUCOSE BLD STRIP.AUTO-MCNC: 96 MG/DL (ref 65–117)
GLUCOSE SERPL-MCNC: 107 MG/DL (ref 65–100)
HCT VFR BLD AUTO: 44.2 % (ref 36.6–50.3)
HGB BLD-MCNC: 14.7 G/DL (ref 12.1–17)
IMM GRANULOCYTES # BLD AUTO: 0 K/UL (ref 0–0.04)
IMM GRANULOCYTES NFR BLD AUTO: 0 % (ref 0–0.5)
INR PPP: 1.1 (ref 0.9–1.1)
LYMPHOCYTES # BLD: 1.3 K/UL (ref 0.8–3.5)
LYMPHOCYTES NFR BLD: 19 % (ref 12–49)
MCH RBC QN AUTO: 32.4 PG (ref 26–34)
MCHC RBC AUTO-ENTMCNC: 33.3 G/DL (ref 30–36.5)
MCV RBC AUTO: 97.4 FL (ref 80–99)
MONOCYTES # BLD: 0.9 K/UL (ref 0–1)
MONOCYTES NFR BLD: 14 % (ref 5–13)
NEUTS SEG # BLD: 4.2 K/UL (ref 1.8–8)
NEUTS SEG NFR BLD: 62 % (ref 32–75)
NRBC # BLD: 0 K/UL (ref 0–0.01)
NRBC BLD-RTO: 0 PER 100 WBC
PLATELET # BLD AUTO: 240 K/UL (ref 150–400)
PMV BLD AUTO: 10.5 FL (ref 8.9–12.9)
POTASSIUM SERPL-SCNC: 4.1 MMOL/L (ref 3.5–5.1)
PROT SERPL-MCNC: 7.4 G/DL (ref 6.4–8.2)
PROTHROMBIN TIME: 11 SEC (ref 9–11.1)
RBC # BLD AUTO: 4.54 M/UL (ref 4.1–5.7)
SERVICE CMNT-IMP: NORMAL
SODIUM SERPL-SCNC: 140 MMOL/L (ref 136–145)
SPECIMEN HOLD: NORMAL
TROPONIN I SERPL HS-MCNC: 50 NG/L (ref 0–76)
WBC # BLD AUTO: 6.6 K/UL (ref 4.1–11.1)

## 2024-12-23 PROCEDURE — 70450 CT HEAD/BRAIN W/O DYE: CPT

## 2024-12-23 PROCEDURE — 36415 COLL VENOUS BLD VENIPUNCTURE: CPT

## 2024-12-23 PROCEDURE — 70498 CT ANGIOGRAPHY NECK: CPT

## 2024-12-23 PROCEDURE — 82962 GLUCOSE BLOOD TEST: CPT

## 2024-12-23 PROCEDURE — 6360000002 HC RX W HCPCS: Performed by: EMERGENCY MEDICINE

## 2024-12-23 PROCEDURE — 99285 EMERGENCY DEPT VISIT HI MDM: CPT

## 2024-12-23 PROCEDURE — 1100000000 HC RM PRIVATE

## 2024-12-23 PROCEDURE — 96374 THER/PROPH/DIAG INJ IV PUSH: CPT

## 2024-12-23 PROCEDURE — 85025 COMPLETE CBC W/AUTO DIFF WBC: CPT

## 2024-12-23 PROCEDURE — 2500000003 HC RX 250 WO HCPCS: Performed by: STUDENT IN AN ORGANIZED HEALTH CARE EDUCATION/TRAINING PROGRAM

## 2024-12-23 PROCEDURE — 80053 COMPREHEN METABOLIC PANEL: CPT

## 2024-12-23 PROCEDURE — 85610 PROTHROMBIN TIME: CPT

## 2024-12-23 PROCEDURE — 93005 ELECTROCARDIOGRAM TRACING: CPT | Performed by: EMERGENCY MEDICINE

## 2024-12-23 PROCEDURE — 6360000004 HC RX CONTRAST MEDICATION: Performed by: EMERGENCY MEDICINE

## 2024-12-23 PROCEDURE — 70496 CT ANGIOGRAPHY HEAD: CPT

## 2024-12-23 PROCEDURE — 84484 ASSAY OF TROPONIN QUANT: CPT

## 2024-12-23 RX ORDER — SODIUM CHLORIDE 0.9 % (FLUSH) 0.9 %
5-40 SYRINGE (ML) INJECTION PRN
Status: DISCONTINUED | OUTPATIENT
Start: 2024-12-23 | End: 2024-12-24 | Stop reason: HOSPADM

## 2024-12-23 RX ORDER — FUROSEMIDE 40 MG/1
40 TABLET ORAL DAILY
Status: DISCONTINUED | OUTPATIENT
Start: 2024-12-24 | End: 2024-12-24 | Stop reason: HOSPADM

## 2024-12-23 RX ORDER — ACETAMINOPHEN 325 MG/1
650 TABLET ORAL EVERY 6 HOURS PRN
Status: DISCONTINUED | OUTPATIENT
Start: 2024-12-23 | End: 2024-12-24 | Stop reason: HOSPADM

## 2024-12-23 RX ORDER — ASPIRIN 300 MG/1
300 SUPPOSITORY RECTAL DAILY
Status: DISCONTINUED | OUTPATIENT
Start: 2024-12-24 | End: 2024-12-24 | Stop reason: HOSPADM

## 2024-12-23 RX ORDER — ISOSORBIDE MONONITRATE 30 MG/1
60 TABLET, EXTENDED RELEASE ORAL DAILY
Status: DISCONTINUED | OUTPATIENT
Start: 2024-12-24 | End: 2024-12-24

## 2024-12-23 RX ORDER — SPIRONOLACTONE 25 MG/1
25 TABLET ORAL DAILY
Status: DISCONTINUED | OUTPATIENT
Start: 2024-12-24 | End: 2024-12-24 | Stop reason: HOSPADM

## 2024-12-23 RX ORDER — METOPROLOL SUCCINATE 25 MG/1
25 TABLET, EXTENDED RELEASE ORAL DAILY
Status: DISCONTINUED | OUTPATIENT
Start: 2024-12-24 | End: 2024-12-24 | Stop reason: HOSPADM

## 2024-12-23 RX ORDER — SODIUM CHLORIDE 0.9 % (FLUSH) 0.9 %
5-40 SYRINGE (ML) INJECTION EVERY 12 HOURS SCHEDULED
Status: DISCONTINUED | OUTPATIENT
Start: 2024-12-23 | End: 2024-12-24 | Stop reason: HOSPADM

## 2024-12-23 RX ORDER — IOPAMIDOL 755 MG/ML
100 INJECTION, SOLUTION INTRAVASCULAR
Status: COMPLETED | OUTPATIENT
Start: 2024-12-23 | End: 2024-12-23

## 2024-12-23 RX ORDER — ATORVASTATIN CALCIUM 40 MG/1
80 TABLET, FILM COATED ORAL NIGHTLY
Status: DISCONTINUED | OUTPATIENT
Start: 2024-12-23 | End: 2024-12-24 | Stop reason: HOSPADM

## 2024-12-23 RX ORDER — ASPIRIN 81 MG/1
81 TABLET, CHEWABLE ORAL DAILY
Status: DISCONTINUED | OUTPATIENT
Start: 2024-12-24 | End: 2024-12-24 | Stop reason: HOSPADM

## 2024-12-23 RX ORDER — ONDANSETRON 2 MG/ML
4 INJECTION INTRAMUSCULAR; INTRAVENOUS EVERY 6 HOURS PRN
Status: DISCONTINUED | OUTPATIENT
Start: 2024-12-23 | End: 2024-12-24 | Stop reason: HOSPADM

## 2024-12-23 RX ORDER — METOPROLOL SUCCINATE 25 MG/1
25 TABLET, EXTENDED RELEASE ORAL DAILY
COMMUNITY

## 2024-12-23 RX ORDER — ACETAMINOPHEN 650 MG/1
650 SUPPOSITORY RECTAL EVERY 6 HOURS PRN
Status: DISCONTINUED | OUTPATIENT
Start: 2024-12-23 | End: 2024-12-24 | Stop reason: HOSPADM

## 2024-12-23 RX ORDER — ENOXAPARIN SODIUM 100 MG/ML
40 INJECTION SUBCUTANEOUS DAILY
Status: DISCONTINUED | OUTPATIENT
Start: 2024-12-24 | End: 2024-12-24 | Stop reason: HOSPADM

## 2024-12-23 RX ORDER — POLYETHYLENE GLYCOL 3350 17 G/17G
17 POWDER, FOR SOLUTION ORAL DAILY PRN
Status: DISCONTINUED | OUTPATIENT
Start: 2024-12-23 | End: 2024-12-24 | Stop reason: HOSPADM

## 2024-12-23 RX ORDER — ACETAMINOPHEN 325 MG/1
650 TABLET ORAL EVERY 4 HOURS PRN
Status: DISCONTINUED | OUTPATIENT
Start: 2024-12-23 | End: 2024-12-23

## 2024-12-23 RX ORDER — HYDRALAZINE HYDROCHLORIDE 20 MG/ML
20 INJECTION INTRAMUSCULAR; INTRAVENOUS ONCE
Status: COMPLETED | OUTPATIENT
Start: 2024-12-23 | End: 2024-12-23

## 2024-12-23 RX ORDER — ONDANSETRON 4 MG/1
4 TABLET, ORALLY DISINTEGRATING ORAL EVERY 8 HOURS PRN
Status: DISCONTINUED | OUTPATIENT
Start: 2024-12-23 | End: 2024-12-24 | Stop reason: HOSPADM

## 2024-12-23 RX ORDER — ASPIRIN 81 MG/1
81 TABLET, CHEWABLE ORAL DAILY
COMMUNITY

## 2024-12-23 RX ORDER — SODIUM CHLORIDE 9 MG/ML
INJECTION, SOLUTION INTRAVENOUS PRN
Status: DISCONTINUED | OUTPATIENT
Start: 2024-12-23 | End: 2024-12-24 | Stop reason: HOSPADM

## 2024-12-23 RX ORDER — LOSARTAN POTASSIUM 25 MG/1
50 TABLET ORAL DAILY
Status: DISCONTINUED | OUTPATIENT
Start: 2024-12-24 | End: 2024-12-24 | Stop reason: HOSPADM

## 2024-12-23 RX ORDER — LABETALOL HYDROCHLORIDE 5 MG/ML
10 INJECTION, SOLUTION INTRAVENOUS EVERY 10 MIN PRN
Status: DISCONTINUED | OUTPATIENT
Start: 2024-12-23 | End: 2024-12-24 | Stop reason: HOSPADM

## 2024-12-23 RX ORDER — DILTIAZEM HYDROCHLORIDE 240 MG/1
240 CAPSULE, EXTENDED RELEASE ORAL DAILY
Status: DISCONTINUED | OUTPATIENT
Start: 2024-12-24 | End: 2024-12-24 | Stop reason: HOSPADM

## 2024-12-23 RX ADMIN — SODIUM CHLORIDE, PRESERVATIVE FREE 10 ML: 5 INJECTION INTRAVENOUS at 19:50

## 2024-12-23 RX ADMIN — HYDRALAZINE HYDROCHLORIDE 20 MG: 20 INJECTION INTRAMUSCULAR; INTRAVENOUS at 17:45

## 2024-12-23 RX ADMIN — IOPAMIDOL 100 ML: 755 INJECTION, SOLUTION INTRAVENOUS at 17:19

## 2024-12-23 ASSESSMENT — PAIN - FUNCTIONAL ASSESSMENT: PAIN_FUNCTIONAL_ASSESSMENT: NONE - DENIES PAIN

## 2024-12-23 ASSESSMENT — PAIN SCALES - GENERAL: PAINLEVEL_OUTOF10: 0

## 2024-12-23 ASSESSMENT — LIFESTYLE VARIABLES
HOW MANY STANDARD DRINKS CONTAINING ALCOHOL DO YOU HAVE ON A TYPICAL DAY: 3 OR 4
HOW OFTEN DO YOU HAVE A DRINK CONTAINING ALCOHOL: 2-3 TIMES A WEEK

## 2024-12-23 NOTE — ED TRIAGE NOTES
Patient ambulatory to triage w c/o going half blind yesterday, MD referred him over for branch retinal artery occlusion of the L eye.

## 2024-12-23 NOTE — ED PROVIDER NOTES
\[facility]\"" EMERGENCY DEPT  EMERGENCY DEPARTMENT ENCOUNTER    Patient Name: Young Marie Jr  MRN: [id number]  YOB: 1950  Provider: Madhav Coburn MD  PCP: Bg Walsh MD  Time/Date of evaluation: 3:54 PM EST on 12/23/24    History of Presenting Illness     Chief Complaint   Patient presents with    Referral - General     Patient ambulatory to triage w c/o going half blind yesterday, MD referred him over for branch retinal artery occlusion of the L eye.     History Provided by: Patient   History is limited by: Nothing    HISTORY (Narrative):   Young Marie Jr is a 74 y.o. male with a PMHX of atrial fibrillation, CHF, hypertension, hypercholesterolemia, and CAD  who presents to the emergency department (room 3) by POV C/O loss of vision out of the left lower visual field that started yesterday around 5 PM.  Patient went to the retinal specialist today and was diagnosed with branch retinal artery occlusion.  He was sent to the emergency department for further evaluation/stroke workup.    Nursing Notes were all reviewed and agreed with or any disagreements were addressed in the HPI.    Past History     PAST MEDICAL HISTORY:  Past Medical History:   Diagnosis Date    ABRAM (acute kidney injury) (HCC) 08/31/2023    Arrhythmia     a fib    Atrial fibrillation (HCC)     S/p albation, left atrial appendage surgery    Cancer (HCC)     skin, throat cancer    Congestive heart failure (HCC) 11/2020    hosp at [facility]    G tube feedings (HCC)     PEG    High cholesterol     Hypertension     Hypertrophic cardiomyopathy (HCC)     per cardiology notes 6/2013    Long term current use of anticoagulant therapy     MI (myocardial infarction) (HCC)     per pt, was told he had MI over 10 years    Status post chemoradiation     completed 12/2013    Throat cancer (HCC)     Viral meningitis        PAST SURGICAL HISTORY:  Past Surgical History:   Procedure Laterality Date    CARDIAC CATHETERIZATION  2007    CARDIAC  bradycardia and A-V block      A-V block: 1st Degree    Ectopy:     Ectopy: none    QRS:     QRS axis:  Normal  ST segments:     ST segments:  Normal  T waves:     T waves: inverted      Inverted:  V4, V5 and V6  Other findings:     Other findings: LVH      ED FINAL IMPRESSION     1. 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 Essential hypertension      DISPOSITION/PLAN     Admit Note: Pt is being admitted by Hospitalist . The results of their tests and reason(s) for their admission have been discussed with pt and/or available family. They convey agreement and understanding for the need to be admitted and for the admission diagnosis.    (Please note that parts of this dictation were completed with voice recognition software. Quite often unanticipated grammatical, syntax, homophones, and other interpretive errors are inadvertently transcribed by the computer software. Please disregards these errors. Please excuse any errors that have escaped final proofreading.)    I am the Primary Clinician of Record.   Madhav Coburn MD  (Electronically signed)           Madhav Coburn MD  12/23/24 2034

## 2024-12-24 ENCOUNTER — APPOINTMENT (OUTPATIENT)
Facility: HOSPITAL | Age: 74
DRG: 065 | End: 2024-12-24
Attending: STUDENT IN AN ORGANIZED HEALTH CARE EDUCATION/TRAINING PROGRAM
Payer: MEDICARE

## 2024-12-24 ENCOUNTER — APPOINTMENT (OUTPATIENT)
Facility: HOSPITAL | Age: 74
DRG: 065 | End: 2024-12-24
Payer: MEDICARE

## 2024-12-24 VITALS
TEMPERATURE: 98.6 F | OXYGEN SATURATION: 94 % | DIASTOLIC BLOOD PRESSURE: 75 MMHG | RESPIRATION RATE: 18 BRPM | SYSTOLIC BLOOD PRESSURE: 141 MMHG | WEIGHT: 214.51 LBS | HEIGHT: 67 IN | BODY MASS INDEX: 33.67 KG/M2 | HEART RATE: 72 BPM

## 2024-12-24 LAB
ALBUMIN SERPL-MCNC: 3.2 G/DL (ref 3.5–5)
ALBUMIN/GLOB SERPL: 1.1 (ref 1.1–2.2)
ALP SERPL-CCNC: 56 U/L (ref 45–117)
ALT SERPL-CCNC: 20 U/L (ref 12–78)
ANION GAP SERPL CALC-SCNC: 5 MMOL/L (ref 2–12)
AST SERPL-CCNC: 16 U/L (ref 15–37)
BASOPHILS # BLD: 0 K/UL (ref 0–0.1)
BASOPHILS NFR BLD: 0 % (ref 0–1)
BILIRUB SERPL-MCNC: 0.5 MG/DL (ref 0.2–1)
BUN SERPL-MCNC: 25 MG/DL (ref 6–20)
BUN/CREAT SERPL: 18 (ref 12–20)
CALCIUM SERPL-MCNC: 8.7 MG/DL (ref 8.5–10.1)
CHLORIDE SERPL-SCNC: 106 MMOL/L (ref 97–108)
CHOLEST SERPL-MCNC: 158 MG/DL
CO2 SERPL-SCNC: 29 MMOL/L (ref 21–32)
CREAT SERPL-MCNC: 1.38 MG/DL (ref 0.7–1.3)
DIFFERENTIAL METHOD BLD: ABNORMAL
ECHO AO ROOT DIAM: 3.3 CM
ECHO AO ROOT INDEX: 1.59 CM/M2
ECHO AV PEAK GRADIENT: 10 MMHG
ECHO AV PEAK VELOCITY: 1.6 M/S
ECHO AV VELOCITY RATIO: 0.75
ECHO BSA: 2.14 M2
ECHO EST RA PRESSURE: 10 MMHG
ECHO LA DIAMETER INDEX: 2.21 CM/M2
ECHO LA DIAMETER: 4.6 CM
ECHO LA TO AORTIC ROOT RATIO: 1.39
ECHO LA VOL A-L A2C: 105 ML (ref 18–58)
ECHO LA VOL A-L A4C: 114 ML (ref 18–58)
ECHO LA VOL MOD A2C: 101 ML (ref 18–58)
ECHO LA VOL MOD A4C: 102 ML (ref 18–58)
ECHO LA VOLUME AREA LENGTH: 111 ML
ECHO LA VOLUME INDEX A-L A2C: 50 ML/M2 (ref 16–34)
ECHO LA VOLUME INDEX A-L A4C: 55 ML/M2 (ref 16–34)
ECHO LA VOLUME INDEX AREA LENGTH: 53 ML/M2 (ref 16–34)
ECHO LA VOLUME INDEX MOD A2C: 49 ML/M2 (ref 16–34)
ECHO LA VOLUME INDEX MOD A4C: 49 ML/M2 (ref 16–34)
ECHO LV E' LATERAL VELOCITY: 7.47 CM/S
ECHO LV E' SEPTAL VELOCITY: 3.61 CM/S
ECHO LV EF PHYSICIAN: 75 %
ECHO LV FRACTIONAL SHORTENING: 47 % (ref 28–44)
ECHO LV INTERNAL DIMENSION DIASTOLE INDEX: 2.16 CM/M2
ECHO LV INTERNAL DIMENSION DIASTOLIC: 4.5 CM (ref 4.2–5.9)
ECHO LV INTERNAL DIMENSION SYSTOLIC INDEX: 1.15 CM/M2
ECHO LV INTERNAL DIMENSION SYSTOLIC: 2.4 CM
ECHO LV IVSD: 1.7 CM (ref 0.6–1)
ECHO LV MASS 2D: 383.5 G (ref 88–224)
ECHO LV MASS INDEX 2D: 184.4 G/M2 (ref 49–115)
ECHO LV POSTERIOR WALL DIASTOLIC: 2 CM (ref 0.6–1)
ECHO LV RELATIVE WALL THICKNESS RATIO: 0.89
ECHO LVOT PEAK GRADIENT: 6 MMHG
ECHO LVOT PEAK VELOCITY: 1.2 M/S
ECHO RIGHT VENTRICULAR SYSTOLIC PRESSURE (RVSP): 73 MMHG
ECHO TV REGURGITANT MAX VELOCITY: 3.98 M/S
ECHO TV REGURGITANT PEAK GRADIENT: 63 MMHG
EOSINOPHIL # BLD: 0.3 K/UL (ref 0–0.4)
EOSINOPHIL NFR BLD: 5 % (ref 0–7)
ERYTHROCYTE [DISTWIDTH] IN BLOOD BY AUTOMATED COUNT: 13.1 % (ref 11.5–14.5)
EST. AVERAGE GLUCOSE BLD GHB EST-MCNC: 105 MG/DL
GLOBULIN SER CALC-MCNC: 2.9 G/DL (ref 2–4)
GLUCOSE SERPL-MCNC: 97 MG/DL (ref 65–100)
HBA1C MFR BLD: 5.3 % (ref 4–5.6)
HCT VFR BLD AUTO: 40.9 % (ref 36.6–50.3)
HDLC SERPL-MCNC: 46 MG/DL
HDLC SERPL: 3.4 (ref 0–5)
HGB BLD-MCNC: 13.5 G/DL (ref 12.1–17)
IMM GRANULOCYTES # BLD AUTO: 0 K/UL (ref 0–0.04)
IMM GRANULOCYTES NFR BLD AUTO: 1 % (ref 0–0.5)
LDLC SERPL CALC-MCNC: 96.4 MG/DL (ref 0–100)
LYMPHOCYTES # BLD: 1 K/UL (ref 0.8–3.5)
LYMPHOCYTES NFR BLD: 16 % (ref 12–49)
MCH RBC QN AUTO: 32.1 PG (ref 26–34)
MCHC RBC AUTO-ENTMCNC: 33 G/DL (ref 30–36.5)
MCV RBC AUTO: 97.1 FL (ref 80–99)
MONOCYTES # BLD: 0.9 K/UL (ref 0–1)
MONOCYTES NFR BLD: 14 % (ref 5–13)
NEUTS SEG # BLD: 4 K/UL (ref 1.8–8)
NEUTS SEG NFR BLD: 64 % (ref 32–75)
NRBC # BLD: 0 K/UL (ref 0–0.01)
NRBC BLD-RTO: 0 PER 100 WBC
PLATELET # BLD AUTO: 214 K/UL (ref 150–400)
PMV BLD AUTO: 10.8 FL (ref 8.9–12.9)
POTASSIUM SERPL-SCNC: 3.8 MMOL/L (ref 3.5–5.1)
PROT SERPL-MCNC: 6.1 G/DL (ref 6.4–8.2)
RBC # BLD AUTO: 4.21 M/UL (ref 4.1–5.7)
SODIUM SERPL-SCNC: 140 MMOL/L (ref 136–145)
TRIGL SERPL-MCNC: 78 MG/DL
VLDLC SERPL CALC-MCNC: 15.6 MG/DL
WBC # BLD AUTO: 6.2 K/UL (ref 4.1–11.1)

## 2024-12-24 PROCEDURE — 83036 HEMOGLOBIN GLYCOSYLATED A1C: CPT

## 2024-12-24 PROCEDURE — 70551 MRI BRAIN STEM W/O DYE: CPT

## 2024-12-24 PROCEDURE — 36415 COLL VENOUS BLD VENIPUNCTURE: CPT

## 2024-12-24 PROCEDURE — 97112 NEUROMUSCULAR REEDUCATION: CPT

## 2024-12-24 PROCEDURE — 80061 LIPID PANEL: CPT

## 2024-12-24 PROCEDURE — C8924 2D TTE W OR W/O FOL W/CON,FU: HCPCS

## 2024-12-24 PROCEDURE — 85025 COMPLETE CBC W/AUTO DIFF WBC: CPT

## 2024-12-24 PROCEDURE — 6370000000 HC RX 637 (ALT 250 FOR IP): Performed by: STUDENT IN AN ORGANIZED HEALTH CARE EDUCATION/TRAINING PROGRAM

## 2024-12-24 PROCEDURE — 97161 PT EVAL LOW COMPLEX 20 MIN: CPT

## 2024-12-24 PROCEDURE — 99222 1ST HOSP IP/OBS MODERATE 55: CPT | Performed by: PSYCHIATRY & NEUROLOGY

## 2024-12-24 PROCEDURE — 97535 SELF CARE MNGMENT TRAINING: CPT

## 2024-12-24 PROCEDURE — 80053 COMPREHEN METABOLIC PANEL: CPT

## 2024-12-24 PROCEDURE — 97165 OT EVAL LOW COMPLEX 30 MIN: CPT

## 2024-12-24 PROCEDURE — 2500000003 HC RX 250 WO HCPCS: Performed by: STUDENT IN AN ORGANIZED HEALTH CARE EDUCATION/TRAINING PROGRAM

## 2024-12-24 PROCEDURE — 6360000002 HC RX W HCPCS: Performed by: STUDENT IN AN ORGANIZED HEALTH CARE EDUCATION/TRAINING PROGRAM

## 2024-12-24 PROCEDURE — 6360000004 HC RX CONTRAST MEDICATION: Performed by: INTERNAL MEDICINE

## 2024-12-24 RX ORDER — HYDRALAZINE HYDROCHLORIDE 20 MG/ML
10 INJECTION INTRAMUSCULAR; INTRAVENOUS EVERY 6 HOURS PRN
Status: DISCONTINUED | OUTPATIENT
Start: 2024-12-24 | End: 2024-12-24 | Stop reason: HOSPADM

## 2024-12-24 RX ORDER — ISOSORBIDE MONONITRATE 30 MG/1
60 TABLET, EXTENDED RELEASE ORAL DAILY
Status: DISCONTINUED | OUTPATIENT
Start: 2024-12-24 | End: 2024-12-24 | Stop reason: HOSPADM

## 2024-12-24 RX ORDER — ATORVASTATIN CALCIUM 80 MG/1
80 TABLET, FILM COATED ORAL NIGHTLY
Qty: 30 TABLET | Refills: 0 | Status: SHIPPED | OUTPATIENT
Start: 2024-12-24

## 2024-12-24 RX ADMIN — SODIUM CHLORIDE, PRESERVATIVE FREE 10 ML: 5 INJECTION INTRAVENOUS at 08:53

## 2024-12-24 RX ADMIN — ATORVASTATIN CALCIUM 80 MG: 40 TABLET, FILM COATED ORAL at 19:55

## 2024-12-24 RX ADMIN — ISOSORBIDE MONONITRATE 60 MG: 30 TABLET, EXTENDED RELEASE ORAL at 17:32

## 2024-12-24 RX ADMIN — HYDRALAZINE HYDROCHLORIDE 10 MG: 20 INJECTION INTRAMUSCULAR; INTRAVENOUS at 17:33

## 2024-12-24 RX ADMIN — PERFLUTREN 1.5 ML: 6.52 INJECTION, SUSPENSION INTRAVENOUS at 12:44

## 2024-12-24 RX ADMIN — ENOXAPARIN SODIUM 40 MG: 100 INJECTION SUBCUTANEOUS at 08:59

## 2024-12-24 RX ADMIN — ASPIRIN 81 MG: 81 TABLET, CHEWABLE ORAL at 08:59

## 2024-12-24 ASSESSMENT — PAIN SCALES - GENERAL
PAINLEVEL_OUTOF10: 0

## 2024-12-24 NOTE — H&P
Hospitalist Admission Note    NAME:   Young Marie Jr   : 1950   MRN: [id number]     Date/Time: 2024 7:00 PM    Patient PCP: Bg Walsh MD    ______________________________________________________________________  Given the patient's current clinical presentation, I have a high level of concern for decompensation if discharged from the emergency department.  Complex decision making was performed, which includes reviewing the patient's available past medical records, laboratory results, and x-ray films.       My assessment of this patient's clinical condition and my plan of care is as follows.    Assessment / Plan:    Left eye branch retinal artery occlusion  Stroke workup  Sent by retinal specialist Abel Mcdaniel MD from ophthalmology clinic for complete stroke workup.  Admit to medicine  Stroke order set utilized  CTA head and neck with-noted left side carotid stenosis 80%, right side 50%  MRI brain ordered  Echo  Neurology consult  PT/OT/SLP per protocol-left eye lower vision loss  High-dose statin  A.m. lipid panel and hemoglobin A1c  Hold home antihypertensives likely out of the window and symptoms  IV PRNs    CKD IIIa  Creatinine at stable baseline.  Trend renal function.  Avoid nephrotoxic medications.    History of A-fib  Hypertension  Hypertrophic cardiomyopathy  Status post ablation and atrial appendage surgery.  No longer requiring anticoagulant.  Likely can resume antihypertensives in the morning    Medical Decision Making:   I personally reviewed labs: CBC, CMP  I personally reviewed imaging: CT head, CTA head and neck with and without contrast  I personally reviewed EKG: Sinus bradycardia to my interpretation  Toxic drug monitoring:   Discussed case with: ED provider. After discussion I am in agreement that acuity of patient's medical condition necessitates hospital stay.      Code Status: Full  DVT Prophylaxis: Lovenox  Baseline: Home, independent    Subjective:   CHIEF  4.1 - 11.1 K/uL    RBC 4.54 4.10 - 5.70 M/uL    Hemoglobin 14.7 12.1 - 17.0 g/dL    Hematocrit 44.2 36.6 - 50.3 %    MCV 97.4 80.0 - 99.0 FL    MCH 32.4 26.0 - 34.0 PG    MCHC 33.3 30.0 - 36.5 g/dL    RDW 13.0 11.5 - 14.5 %    Platelets 240 150 - 400 K/uL    MPV 10.5 8.9 - 12.9 FL    Nucleated RBCs 0.0 0  WBC    nRBC 0.00 0.00 - 0.01 K/uL    Neutrophils % 62 32 - 75 %    Lymphocytes % 19 12 - 49 %    Monocytes % 14 (H) 5 - 13 %    Eosinophils % 4 0 - 7 %    Basophils % 1 0 - 1 %    Immature Granulocytes % 0 0.0 - 0.5 %    Neutrophils Absolute 4.2 1.8 - 8.0 K/UL    Lymphocytes Absolute 1.3 0.8 - 3.5 K/UL    Monocytes Absolute 0.9 0.0 - 1.0 K/UL    Eosinophils Absolute 0.2 0.0 - 0.4 K/UL    Basophils Absolute 0.0 0.0 - 0.1 K/UL    Immature Granulocytes Absolute 0.0 0.00 - 0.04 K/UL    Differential Type AUTOMATED     Comprehensive Metabolic Panel    Collection Time: 12/23/24  3:34 PM   Result Value Ref Range    Sodium 140 136 - 145 mmol/L    Potassium 4.1 3.5 - 5.1 mmol/L    Chloride 106 97 - 108 mmol/L    CO2 31 21 - 32 mmol/L    Anion Gap 3 2 - 12 mmol/L    Glucose 107 (H) 65 - 100 mg/dL    BUN 27 (H) 6 - 20 MG/DL    Creatinine 1.48 (H) 0.70 - 1.30 MG/DL    BUN/Creatinine Ratio 18 12 - 20      Est, Glom Filt Rate 49 (L) >60 ml/min/1.73m2    Calcium 9.4 8.5 - 10.1 MG/DL    Total Bilirubin 0.6 0.2 - 1.0 MG/DL    ALT 22 12 - 78 U/L    AST 19 15 - 37 U/L    Alk Phosphatase 66 45 - 117 U/L    Total Protein 7.4 6.4 - 8.2 g/dL    Albumin 3.9 3.5 - 5.0 g/dL    Globulin 3.5 2.0 - 4.0 g/dL    Albumin/Globulin Ratio 1.1 1.1 - 2.2     Extra Tubes Hold    Collection Time: 12/23/24  3:34 PM   Result Value Ref Range    Specimen HOld red, pst, sst, blue     Comment:        Add-on orders for these samples will be processed based on acceptable specimen integrity and analyte stability, which may vary by analyte.   Troponin    Collection Time: 12/23/24  3:34 PM   Result Value Ref Range    Troponin, High Sensitivity 50 0 - 76 ng/L

## 2024-12-24 NOTE — ED NOTES
Bedside shift change report given to RN Parul (oncoming nurse) by VONDA Gomez (offgoing nurse). Report included the following information Nurse Handoff Report, ED Encounter Summary, ED SBAR, and MAR.

## 2024-12-24 NOTE — ED NOTES
Value    Monocytes % 14 (*)     All other components within normal limits   COMPREHENSIVE METABOLIC PANEL - Abnormal; Notable for the following components:    Glucose 107 (*)     BUN 27 (*)     Creatinine 1.48 (*)     Est, Glom Filt Rate 49 (*)     All other components within normal limits       Background  Allergies: No Known Allergies  History:   Past Medical History:   Diagnosis Date    ABRAM (acute kidney injury) (HCC) 08/31/2023    Arrhythmia     a fib    Atrial fibrillation (HCC)     S/p albation, left atrial appendage surgery    Cancer (HCC)     skin, throat cancer    Congestive heart failure (HCC) 11/2020    hosp at [facility]    CVA (cerebrovascular accident due to intracerebral hemorrhage) (HCC) 12/23/2024    G tube feedings (HCC)     PEG    High cholesterol     Hypertension     Hypertrophic cardiomyopathy (HCC)     per cardiology notes 6/2013    Long term current use of anticoagulant therapy     MI (myocardial infarction) (HCC)     per pt, was told he had MI over 10 years    Status post chemoradiation     completed 12/2013    Throat cancer (HCC)     Viral meningitis        Assessment  Vitals: MEWS Score: 1  Level of Consciousness: Alert (0)   Vitals:    12/23/24 2003 12/23/24 2013 12/23/24 2033 12/23/24 2043   BP: (!) 128/59 (!) 106/54 113/65 (!) 116/59   Pulse: 61 55 59 55   Resp: 17 21 18 20   Temp:       TempSrc:       SpO2: 96% 95%     Weight:       Height:           O2 Device: O2 Device: None (Room air)      NIH Score: NIH NIH Stroke Scale  Interval: Baseline  Level of Consciousness (1a): Alert  LOC Questions (1b): Answers both correctly  LOC Commands (1c): Performs both tasks correctly  Best Gaze (2): Normal  Visual (3): No visual loss  Facial Palsy (4): Normal symmetrical movement  Motor Arm, Left (5a): No drift  Motor Arm, Right (5b): No drift  Motor Leg, Left (6a): No drift  Motor Leg, Right (6b): No drift  Limb Ataxia (7): Absent  Sensory (8): Normal  Best Language (9): No aphasia  Dysarthria (10):  Normal  Extinction and Inattention (11): No abnormality  Total: 0   Active LDA's:   Peripheral IV 12/23/24 Right Antecubital (Active)     Recommendation  Incomplete STAT orders: n/a  Overdue Medications: n/a  Additional Comments: held Lipitor states he takes it in the morning and he already took it.  If any further questions, please call Sending RN at [phone]      Admitting Unit Notification  Name of person notified and time: Norma 2120    Electronically signed by: Electronically signed by Shanae Jones RN on 12/23/2024 at 9:19 PM    Opportunity for questions and clarification was provided.      Patient transported with: telemetry and Tech

## 2024-12-24 NOTE — DISCHARGE INSTRUCTIONS
All of your medications from before your hospitalization are the same EXCEPT:  STOP taking lovastatin. Your new prescription for you to start is atorvastatin 80 mg for high cholesterol.  Please take all of your medications as prescribed. If prescribed any medications, please read all pharmacy instructions and inserts. Inform your doctor and pharmacist about all other medications and alternative therapies.  Please follow up with your PCP in 1-2 weeks to be reassessed after your hospital stay.  Please also follow up with your cardiologist for a holter monitor-remind them of the pulmonary hypertension noted on your echo.  YOU MUST FOLLOW UP WITH VASCULAR SURGERY for your carotid arteries. Your stenosis is 80% probably because of radiation. You had a stroke so you MUST follow up ASAP-call them and tell them you are a stroke follow up. We discharged you prior to the holiday because you promised to follow up.  If you start feeling any symptoms similar to what brought you into the hospital, please come back to the ED to be re-evaluated.

## 2024-12-24 NOTE — DISCHARGE SUMMARY
outpatient.  Recommend PCP follow-up/encouraged this.   Echo done-LVEF was 75 to 80%.  Mild aortic regurg, mild to moderate mitral regurg, moderate tricuspid regurg with severely elevated RVSP-consistent with severe pulmonary hypertension.  Patient encouraged to follow-up with cardiology.  Neurology cleared for outpatient follow-up  PT/OT/SLP cleared  High-dose statin   Resume home antihypertensives tomorrow AM     CKD IIIa  Creatinine at stable baseline.  Trend renal function.  Avoid nephrotoxic medications.     History of A-fib  Hypertension  Hypertrophic cardiomyopathy  Status post ablation and atrial appendage surgery.  No longer requiring anticoagulant  FU with cardiology re: pulmonary HTN, BP, and holter monitor    Discharge Exam:  Patient seen and examined by me on discharge day.  Pertinent Findings:  Patient Vitals for the past 24 hrs:   BP Temp Temp src Pulse Resp SpO2   12/24/24 1625 (!) 198/118 -- -- 65 -- --   12/24/24 1445 (!) 175/106 97.9 °F (36.6 °C) Oral 67 18 94 %   12/24/24 1230 (!) 200/101 97.9 °F (36.6 °C) Oral 70 18 96 %   12/24/24 0830 130/84 97.6 °F (36.4 °C) Oral 55 16 97 %   12/24/24 0530 124/81 97.9 °F (36.6 °C) Oral 53 16 97 %   12/24/24 0255 (!) 146/96 98.4 °F (36.9 °C) Oral 60 16 92 %   12/23/24 2345 118/76 98.1 °F (36.7 °C) Oral 53 18 --   12/23/24 2247 118/76 98.1 °F (36.7 °C) Oral 53 18 94 %   12/23/24 2145 (!) 154/83 97.7 °F (36.5 °C) Oral 56 18 --   12/23/24 2043 (!) 116/59 -- -- 55 20 --   12/23/24 2033 113/65 -- -- 59 18 --   12/23/24 2013 (!) 106/54 -- -- 55 21 95 %   12/23/24 2003 (!) 128/59 -- -- 61 17 96 %   12/23/24 1949 128/65 -- -- 57 20 96 %   12/23/24 1929 (!) 166/91 97.8 °F (36.6 °C) Oral 70 14 95 %   12/23/24 1915 (!) 142/74 -- -- 61 16 95 %   12/23/24 1900 (!) 142/76 -- -- 58 19 94 %   12/23/24 1846 111/63 -- -- 62 17 93 %   12/23/24 1815 (!) 111/57 -- -- (!) 49 16 93 %   12/23/24 1800 123/70 -- -- 51 16 93 %   12/23/24 1745 (!) 178/107 -- -- 52 15 94 %   12/23/24 1734  [address] [place] [id number] [phone]    Go on 1/6/2025  a 9:15am for your PCP hospital follow up as previously scheduled.    [facility] [place] [id number]  [address]  [facility] [address] [phone]  Follow up  As needed - [facility] is a mobile urgent care provider that comes to your home. You may call them if you would like to set up an appt to be seen for a follow up while waiting to be seen by your PCP.    Louise Sanz MD  [address] [id number] [phone]    Call      Philippe Russell MD  [address] [address]  [phone]    Call            Total time in minutes spent coordinating this discharge (includes going over instructions, follow-up, prescriptions, and preparing report for sign off to her PCP) :  35 minutes

## 2024-12-24 NOTE — ED NOTES
Assisted pt to bathroom. Pt back in bed, bed wheels locked, bed in lowest position, side rails in upright position x2, pt on the monitor x3, and call bell within reach.

## 2024-12-24 NOTE — ED NOTES
Report attempt to Norma RN to #[phone] states she will call  back in 5-10 mins for nursing report to #[phone]

## 2024-12-24 NOTE — CONSULTS
degrees    T Axis 98 degrees    Diagnosis       Sinus bradycardia with 1st degree AV block  Left ventricular hypertrophy with repolarization abnormality  When compared with ECG of 21-SEP-2023 10:51,  DC interval has increased     POCT Glucose    Collection Time: 12/23/24  4:30 PM   Result Value Ref Range    POC Glucose 96 65 - 117 mg/dL    Performed by: Sophy Gomez RN    Protime-INR    Collection Time: 12/23/24  5:32 PM   Result Value Ref Range    INR 1.1 0.9 - 1.1      Protime 11.0 9.0 - 11.1 sec   Comprehensive Metabolic Panel    Collection Time: 12/24/24  5:12 AM   Result Value Ref Range    Sodium 140 136 - 145 mmol/L    Potassium 3.8 3.5 - 5.1 mmol/L    Chloride 106 97 - 108 mmol/L    CO2 29 21 - 32 mmol/L    Anion Gap 5 2 - 12 mmol/L    Glucose 97 65 - 100 mg/dL    BUN 25 (H) 6 - 20 MG/DL    Creatinine 1.38 (H) 0.70 - 1.30 MG/DL    BUN/Creatinine Ratio 18 12 - 20      Est, Glom Filt Rate 54 (L) >60 ml/min/1.73m2    Calcium 8.7 8.5 - 10.1 MG/DL    Total Bilirubin 0.5 0.2 - 1.0 MG/DL    ALT 20 12 - 78 U/L    AST 16 15 - 37 U/L    Alk Phosphatase 56 45 - 117 U/L    Total Protein 6.1 (L) 6.4 - 8.2 g/dL    Albumin 3.2 (L) 3.5 - 5.0 g/dL    Globulin 2.9 2.0 - 4.0 g/dL    Albumin/Globulin Ratio 1.1 1.1 - 2.2     CBC with Auto Differential    Collection Time: 12/24/24  5:12 AM   Result Value Ref Range    WBC 6.2 4.1 - 11.1 K/uL    RBC 4.21 4.10 - 5.70 M/uL    Hemoglobin 13.5 12.1 - 17.0 g/dL    Hematocrit 40.9 36.6 - 50.3 %    MCV 97.1 80.0 - 99.0 FL    MCH 32.1 26.0 - 34.0 PG    MCHC 33.0 30.0 - 36.5 g/dL    RDW 13.1 11.5 - 14.5 %    Platelets 214 150 - 400 K/uL    MPV 10.8 8.9 - 12.9 FL    Nucleated RBCs 0.0 0  WBC    nRBC 0.00 0.00 - 0.01 K/uL    Neutrophils % 64 32 - 75 %    Lymphocytes % 16 12 - 49 %    Monocytes % 14 (H) 5 - 13 %    Eosinophils % 5 0 - 7 %    Basophils % 0 0 - 1 %    Immature Granulocytes % 1 (H) 0.0 - 0.5 %    Neutrophils Absolute 4.0 1.8 - 8.0 K/UL    Lymphocytes Absolute 1.0 0.8 - 3.5  y.o. male today in the hospital. Based on history, physical, lab and imaging data, the most likely suspected mechanism is stroke - Several small foci of acute infarction left occipital lobe. Chronic microvascular ischemic disease and small chronic left cerebellar infarction.  CTA head and neck with-noted left side carotid stenosis 80%, right side 50%     Recommendations/ plan:  Frequent neurochecks, vital sign parameters  Aspirin 81 mg po q day and high intensity statin  Consult vascular surgery for carotid stenosis  Fasting lipid panel, HbA1c, TTE ECHO,  If TTE ECHO is positive consult cardiology and call Neurology back,  Obtain outpatient cardiac monitoring to eval for cardioembolic source of stroke  We will continue permissive hypertension for typically 48 hrs    Additional Stroke Care (or Document Contraindication) as Follows:  Bedside dysphagia screen; if patient does not pass, maintain NPO and order formal speech evaluation   Antithrombotic therapy (ensure appropriate route, eg. PO, NGT, PEG, or MO)  Lab work including CBC, CMP, PT/INR, aPTT  Markers of cardiac ischemia (eg. Troponin) and obtain EKG  Lipid panel and initiate high intensity statin therapy for LDL goal less than 70  Control blood sugar for goal less than 180, and preferably less than 140, and check HbA1c  DVT prophylaxis  P.T./O.T./Speech/Rehabilitation consultations  Stroke education (contact stroke nurse if necessary)      - Please do not hesitate to call with questions or concerns.  - Thank you for the opportunity to participate in the care of your patient.Please let us know if we can provide any additional information.  ?      Jose Noel MD  12/24/2024    ?

## 2024-12-24 NOTE — PLAN OF CARE
Problem: Chronic Conditions and Co-morbidities  Goal: Patient's chronic conditions and co-morbidity symptoms are monitored and maintained or improved  Outcome: Progressing     Problem: Discharge Planning  Goal: Discharge to home or other facility with appropriate resources  Outcome: Progressing     Problem: Neurosensory - Adult  Goal: Achieves stable or improved neurological status  Outcome: Progressing  Goal: Absence of seizures  Outcome: Progressing  Goal: Remains free of injury related to seizures activity  Outcome: Progressing  Goal: Achieves maximal functionality and self care  Outcome: Progressing

## 2024-12-26 ENCOUNTER — CARE COORDINATION (OUTPATIENT)
Dept: OTHER | Facility: CLINIC | Age: 74
End: 2024-12-26

## 2024-12-26 LAB
EKG ATRIAL RATE: 54 BPM
EKG DIAGNOSIS: NORMAL
EKG P AXIS: 70 DEGREES
EKG P-R INTERVAL: 210 MS
EKG Q-T INTERVAL: 478 MS
EKG QRS DURATION: 96 MS
EKG QTC CALCULATION (BAZETT): 453 MS
EKG R AXIS: 36 DEGREES
EKG T AXIS: 98 DEGREES
EKG VENTRICULAR RATE: 54 BPM

## 2024-12-26 NOTE — CARE COORDINATION
Care Transitions Note    Initial Call - Call within 2 business days of discharge: Yes    Patient Current Location:  [place]    Care Transition Nurse contacted the patient by telephone to perform post hospital discharge assessment, verified name and  as identifiers. Provided introduction to self, and explanation of the Care Transition Nurse role.     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 ED to Hosp-Admission (Discharged) (ADMITTED) [id number] Radha Fung MD; [place],...            Was this an external facility discharge? No    Additional needs identified to be addressed with provider   No needs identified             Care Summary Note: CTN called patient today for an initial call. Patient answered and allowed CTN to explain role. Patient stated he is doing well. He stated he has been making his follow up appts this morning and will see his PCP on . He will see Neuro on 1/15 and his Vascular SX on 1/15/24. Patient stated the only provider he has not reached is Cardiologist due to their office being closed to for the holiday, but he will call them in the morning to make an appt. Patient stated he is picking up his new RX today once it is ready. Patient is very confident in his care at this time and declined further CM services. CTN left number for any future needs. Patient thanked CTN for the call today. CTN will sign off.     Follow Up Appointment:   Discussed follow up appointments. Patient has hospital follow up appointment scheduled within 7 days of discharge.   Future Appointments         Provider Specialty Dept Phone    2025 9:15 AM Bg Walsh MD Internal Medicine [phone]    1/15/2025 1:30 PM

## 2025-01-07 ENCOUNTER — TELEMEDICINE (OUTPATIENT)
Age: 75
End: 2025-01-07

## 2025-01-07 DIAGNOSIS — Z09 HOSPITAL DISCHARGE FOLLOW-UP: Primary | ICD-10-CM

## 2025-01-07 RX ORDER — LOSARTAN POTASSIUM 100 MG/1
100 TABLET ORAL DAILY
Qty: 90 TABLET | Refills: 3 | Status: SHIPPED | OUTPATIENT
Start: 2025-01-07

## 2025-01-07 RX ORDER — CLOPIDOGREL BISULFATE 75 MG/1
TABLET ORAL
COMMUNITY
Start: 2025-01-02

## 2025-01-07 ASSESSMENT — PATIENT HEALTH QUESTIONNAIRE - PHQ9
SUM OF ALL RESPONSES TO PHQ QUESTIONS 1-9: 0
1. LITTLE INTEREST OR PLEASURE IN DOING THINGS: NOT AT ALL
SUM OF ALL RESPONSES TO PHQ9 QUESTIONS 1 & 2: 0
2. FEELING DOWN, DEPRESSED OR HOPELESS: NOT AT ALL
SUM OF ALL RESPONSES TO PHQ QUESTIONS 1-9: 0

## 2025-01-07 NOTE — PROGRESS NOTES
\"Have you been to the ER, urgent care clinic since your last visit?  Hospitalized since your last visit?\"    YES/ ER jessie    “Have you seen or consulted any other health care providers outside our system since your last visit?”    NO             
with Dr. Russell.     He is also due for follow up of other issues.     He has told us that he gets lightheaded when he first gets up.     He is being seen by cardiologist, Dr. Ware, EP for Atrial fibrillation. He is s/p EP study and ablation in 2017.     Dr. Sanz is seeing him for CHF.     It is recalled that he underwent hybrid ablation in August 2023 with Dr. Burton, surgeon, and Dr. Ware. \"They took me off the furosemide, and about a month after the surgery I went into heart failure and had to go back on it.\"    He has a history of throat cancer in 2013, treated with radiation and chemotherapy. He sees an otolaryncologist yearly. (His former ENT, Dr. Dill, retired).    He has had esophageal dilatations.     At this time, he is otherwise doing well and has brought no other complaints to my attention today.  For a list of the medical issues addressed today, see the assessment and plan above.    PMH:   Past Medical History:   Diagnosis Date    ABRAM (acute kidney injury) (HCC) 08/31/2023    Arrhythmia     a fib    Atrial fibrillation (HCC)     S/p albation, left atrial appendage surgery    Cancer (HCC)     skin, throat cancer    Congestive heart failure (HCC) 11/2020    hosp at [facility]    CVA (cerebrovascular accident due to intracerebral hemorrhage) (HCC) 12/23/2024    G tube feedings (HCC)     PEG    High cholesterol     Hypertension     Hypertrophic cardiomyopathy (HCC)     per cardiology notes 6/2013    Long term current use of anticoagulant therapy     MI (myocardial infarction) (HCC)     per pt, was told he had MI over 10 years    Status post chemoradiation     completed 12/2013    Throat cancer (HCC)     Viral meningitis        Past Surgical History:   Procedure Laterality Date    CARDIAC CATHETERIZATION  2007    CARDIAC ELECTROPHYSIOLOGY STUDY AND ABLATION  2017    CARDIAC SURGERY N/A 8/28/2023    TRANSPERICARDIAL HYBRID ABLATION WITH FLUORO, LEFT ATRIAL APPENDAGE CLIP VIA LEFT VATS, JOHN BY DR YO (REQ

## 2025-01-13 ENCOUNTER — HOSPITAL ENCOUNTER (OUTPATIENT)
Facility: HOSPITAL | Age: 75
Discharge: HOME | End: 2025-01-16
Payer: MEDICARE

## 2025-01-13 VITALS
DIASTOLIC BLOOD PRESSURE: 61 MMHG | HEART RATE: 50 BPM | BODY MASS INDEX: 32.21 KG/M2 | HEIGHT: 68 IN | SYSTOLIC BLOOD PRESSURE: 94 MMHG | OXYGEN SATURATION: 98 % | RESPIRATION RATE: 16 BRPM | TEMPERATURE: 97.7 F | WEIGHT: 212.52 LBS

## 2025-01-13 LAB
ABO + RH BLD: NORMAL
ALBUMIN SERPL-MCNC: 3.7 G/DL (ref 3.5–5)
ALBUMIN/GLOB SERPL: 1 (ref 1.1–2.2)
ALP SERPL-CCNC: 69 U/L (ref 45–117)
ALT SERPL-CCNC: 22 U/L (ref 12–78)
ANION GAP SERPL CALC-SCNC: 3 MMOL/L (ref 2–12)
APPEARANCE UR: CLEAR
APTT PPP: 27.9 SEC (ref 22.1–31)
AST SERPL-CCNC: 20 U/L (ref 15–37)
BACTERIA URNS QL MICRO: NEGATIVE /HPF
BILIRUB SERPL-MCNC: 0.6 MG/DL (ref 0.2–1)
BILIRUB UR QL: NEGATIVE
BLOOD GROUP ANTIBODIES SERPL: NORMAL
BUN SERPL-MCNC: 32 MG/DL (ref 6–20)
BUN/CREAT SERPL: 22 (ref 12–20)
CALCIUM SERPL-MCNC: 9.7 MG/DL (ref 8.5–10.1)
CHLORIDE SERPL-SCNC: 105 MMOL/L (ref 97–108)
CO2 SERPL-SCNC: 32 MMOL/L (ref 21–32)
COLOR UR: NORMAL
CREAT SERPL-MCNC: 1.47 MG/DL (ref 0.7–1.3)
EPITH CASTS URNS QL MICRO: NORMAL /LPF
ERYTHROCYTE [DISTWIDTH] IN BLOOD BY AUTOMATED COUNT: 13 % (ref 11.5–14.5)
GLOBULIN SER CALC-MCNC: 3.7 G/DL (ref 2–4)
GLUCOSE SERPL-MCNC: 104 MG/DL (ref 65–100)
GLUCOSE UR STRIP.AUTO-MCNC: NEGATIVE MG/DL
HCT VFR BLD AUTO: 47.8 % (ref 36.6–50.3)
HGB BLD-MCNC: 15.5 G/DL (ref 12.1–17)
HGB UR QL STRIP: NEGATIVE
HYALINE CASTS URNS QL MICRO: NORMAL /LPF (ref 0–2)
INR PPP: 1 (ref 0.9–1.1)
KETONES UR QL STRIP.AUTO: NEGATIVE MG/DL
LEUKOCYTE ESTERASE UR QL STRIP.AUTO: NEGATIVE
MCH RBC QN AUTO: 31.9 PG (ref 26–34)
MCHC RBC AUTO-ENTMCNC: 32.4 G/DL (ref 30–36.5)
MCV RBC AUTO: 98.4 FL (ref 80–99)
NITRITE UR QL STRIP.AUTO: NEGATIVE
NRBC # BLD: 0 K/UL (ref 0–0.01)
NRBC BLD-RTO: 0 PER 100 WBC
P2Y12 PLT RESPONSE: 156 PRU (ref 194–418)
PH UR STRIP: 7 (ref 5–8)
PLATELET # BLD AUTO: 239 K/UL (ref 150–400)
PMV BLD AUTO: 10.5 FL (ref 8.9–12.9)
POTASSIUM SERPL-SCNC: 4.1 MMOL/L (ref 3.5–5.1)
PROT SERPL-MCNC: 7.4 G/DL (ref 6.4–8.2)
PROT UR STRIP-MCNC: NEGATIVE MG/DL
PROTHROMBIN TIME: 10.5 SEC (ref 9–11.1)
RBC # BLD AUTO: 4.86 M/UL (ref 4.1–5.7)
RBC #/AREA URNS HPF: NORMAL /HPF (ref 0–5)
SODIUM SERPL-SCNC: 140 MMOL/L (ref 136–145)
SP GR UR REFRACTOMETRY: 1.01
SPECIMEN EXP DATE BLD: NORMAL
THERAPEUTIC RANGE: NORMAL SECS (ref 58–77)
URINE CULTURE IF INDICATED: NORMAL
UROBILINOGEN UR QL STRIP.AUTO: 0.2 EU/DL (ref 0.2–1)
WBC # BLD AUTO: 6.7 K/UL (ref 4.1–11.1)
WBC URNS QL MICRO: NORMAL /HPF (ref 0–4)

## 2025-01-13 PROCEDURE — 80053 COMPREHEN METABOLIC PANEL: CPT

## 2025-01-13 PROCEDURE — 85027 COMPLETE CBC AUTOMATED: CPT

## 2025-01-13 PROCEDURE — 71046 X-RAY EXAM CHEST 2 VIEWS: CPT

## 2025-01-13 PROCEDURE — 81001 URINALYSIS AUTO W/SCOPE: CPT

## 2025-01-13 PROCEDURE — 86850 RBC ANTIBODY SCREEN: CPT

## 2025-01-13 PROCEDURE — 36415 COLL VENOUS BLD VENIPUNCTURE: CPT

## 2025-01-13 PROCEDURE — 85576 BLOOD PLATELET AGGREGATION: CPT

## 2025-01-13 PROCEDURE — 86901 BLOOD TYPING SEROLOGIC RH(D): CPT

## 2025-01-13 PROCEDURE — 86900 BLOOD TYPING SEROLOGIC ABO: CPT

## 2025-01-13 PROCEDURE — 85610 PROTHROMBIN TIME: CPT

## 2025-01-13 PROCEDURE — 85730 THROMBOPLASTIN TIME PARTIAL: CPT

## 2025-01-13 RX ORDER — SODIUM CHLORIDE, SODIUM LACTATE, POTASSIUM CHLORIDE, CALCIUM CHLORIDE 600; 310; 30; 20 MG/100ML; MG/100ML; MG/100ML; MG/100ML
INJECTION, SOLUTION INTRAVENOUS CONTINUOUS
OUTPATIENT
Start: 2025-01-20

## 2025-01-13 RX ORDER — VITAMIN E 268 MG
400 CAPSULE ORAL DAILY
COMMUNITY

## 2025-01-13 RX ORDER — CEFAZOLIN SODIUM/WATER 2 G/20 ML
2000 SYRINGE (ML) INTRAVENOUS ONCE
OUTPATIENT
Start: 2025-01-20

## 2025-01-13 RX ORDER — FUROSEMIDE 40 MG/1
40 TABLET ORAL DAILY
COMMUNITY

## 2025-01-13 ASSESSMENT — PAIN SCALES - GENERAL: PAINLEVEL_OUTOF10: 0

## 2025-01-13 NOTE — PROGRESS NOTES

## 2025-01-13 NOTE — PROGRESS NOTES
[facility]  Preoperative Instructions        Surgery Date 1/20/25          Time of Arrival to be called on 1/17 between 2-5pm to [phone]     On the day of your surgery, please report to Surgical Services Registration Desk and sign in at your designated time.  The Surgery Center is located to the right of the Emergency Room.     2. You must have someone with you to drive you home. You should not drive a car for 24 hours following surgery. Please make arrangements for a friend or family member to stay with you for the first 24 hours after your surgery.    3. Do not have anything to eat or drink (including water, gum, mints, coffee, juice) after midnight 1/19. This may not apply to medications prescribed by your physician. ?(Please note below the special instructions with medications to take the morning of your procedure.)    4. We recommend you do not drink any alcoholic beverages for 24 hours before and after your surgery.    5. Contact your surgeon's office for instructions on the following medications: non-steroidal anti-inflammatory drugs (i.e. Advil, Aleve), vitamins, and supplements. (Some surgeon's will want you to stop these medications prior to surgery and others may allow you to take them)  **If you are currently taking Plavix, Coumadin, Aspirin and/or other blood-thinning agents, contact your surgeon for instructions.** Your surgeon will partner with the physician prescribing these medications to determine if it is safe to stop or if you need to continue taking.  Please do not stop taking these medications without instructions from your surgeon    6. Wear comfortable clothes.  Wear glasses instead of contacts.  Do not bring any money or jewelry. Please bring picture ID, insurance card, and any prearranged co-payment or hospital payment.  Do not wear make-up, particularly mascara the morning of your surgery.  Do not wear nail polish, particularly if you are having foot /hand

## 2025-01-15 ENCOUNTER — OFFICE VISIT (OUTPATIENT)
Age: 75
End: 2025-01-15
Payer: MEDICARE

## 2025-01-15 VITALS
WEIGHT: 212 LBS | SYSTOLIC BLOOD PRESSURE: 128 MMHG | OXYGEN SATURATION: 98 % | RESPIRATION RATE: 17 BRPM | HEIGHT: 68 IN | DIASTOLIC BLOOD PRESSURE: 80 MMHG | HEART RATE: 68 BPM | BODY MASS INDEX: 32.13 KG/M2

## 2025-01-15 DIAGNOSIS — I65.22 STENOSIS OF LEFT CAROTID ARTERY: ICD-10-CM

## 2025-01-15 DIAGNOSIS — H34.232 RETINAL ARTERY BRANCH OCCLUSION OF LEFT EYE: ICD-10-CM

## 2025-01-15 DIAGNOSIS — Z09 HOSPITAL DISCHARGE FOLLOW-UP: ICD-10-CM

## 2025-01-15 DIAGNOSIS — Z86.73 HISTORY OF STROKE: Primary | ICD-10-CM

## 2025-01-15 DIAGNOSIS — H53.412 VISION LOSS, CENTRAL, LEFT: ICD-10-CM

## 2025-01-15 PROCEDURE — 3079F DIAST BP 80-89 MM HG: CPT

## 2025-01-15 PROCEDURE — 1126F AMNT PAIN NOTED NONE PRSNT: CPT

## 2025-01-15 PROCEDURE — 99215 OFFICE O/P EST HI 40 MIN: CPT

## 2025-01-15 PROCEDURE — 1036F TOBACCO NON-USER: CPT

## 2025-01-15 PROCEDURE — 3017F COLORECTAL CA SCREEN DOC REV: CPT

## 2025-01-15 PROCEDURE — 3074F SYST BP LT 130 MM HG: CPT

## 2025-01-15 PROCEDURE — G8417 CALC BMI ABV UP PARAM F/U: HCPCS

## 2025-01-15 PROCEDURE — 1111F DSCHRG MED/CURRENT MED MERGE: CPT

## 2025-01-15 PROCEDURE — 1123F ACP DISCUSS/DSCN MKR DOCD: CPT

## 2025-01-15 PROCEDURE — 1160F RVW MEDS BY RX/DR IN RCRD: CPT

## 2025-01-15 PROCEDURE — 1159F MED LIST DOCD IN RCRD: CPT

## 2025-01-15 PROCEDURE — G8427 DOCREV CUR MEDS BY ELIG CLIN: HCPCS

## 2025-01-15 ASSESSMENT — PATIENT HEALTH QUESTIONNAIRE - PHQ9
1. LITTLE INTEREST OR PLEASURE IN DOING THINGS: NOT AT ALL
2. FEELING DOWN, DEPRESSED OR HOPELESS: NOT AT ALL
SUM OF ALL RESPONSES TO PHQ QUESTIONS 1-9: 0
SUM OF ALL RESPONSES TO PHQ9 QUESTIONS 1 & 2: 0

## 2025-01-15 NOTE — PROGRESS NOTES
Physician Progress Note      PATIENT:               YANY DAS JR  CSN #:                  [id number]  :                       1950  ADMIT DATE:       2024 3:17 PM  DISCH DATE:        2024 8:55 PM  RESPONDING  PROVIDER #:        Radha Fung MD          QUERY TEXT:    Pt admitted with CVA. Pt noted to have 80% left side carotid stenosis. If   possible, please document in progress notes and discharge summary the   relationship, if any, between CVA and carotid stenosis.    The medical record reflects the following:  Risk Factors: Hypertension, Heart failure, Hypercholesterolemia, Age - 74   years old, left carotid stenosis  Clinical Indicators: neurology CN on : \"She saw the retinal specialist   today who found left eye retinal branch artery occlusion and recommended she   present to the emergency department for complete stroke workup\", most likely   suspected mechanism is stroke - Several small foci of acute infarction left   occipital lobe. Chronic microvascular ischemic disease and small chronic left   cerebellar infarction. CTA head and neck with-noted left side carotid stenosis   80%, right side 50% - Aspirin 81 mg po q day and high intensity statin and   Consult vascular surgery for carotid stenosis\".  DS on  states: \" CVA - MRI with several small foci of acute infarction in   the left occipital lobe. CTA head and neck with-noted left side carotid   stenosis 80%, right side 50%--- patient desired to leave prior to the    holiday, left prior to vascular surgery evaluation-vascular surgery   number provided and patient will schedule close follow-up outpatient\".  CT head and neck on : \"80% of left Cervical carotid stenoses\".  MRI brain on : \"Several small foci of acute infarction left occipital   lobe\".  Treatment: oral high dose statin, oral aspirin, Imaging, PT/OT/SLP, neurology   outpatient follow up.    Thank you,  Radha Shelley, [facility] 
.  Pulmonary and Critical Care Medicine Progress Note    ADMISSION DATE:  6/4/2021  DATE:  6/9/2021  CURRENT HOSPITAL DAY:  Hospital Day: 6  ATTENDING PHYSICIAN:  Robbie Dave MD  CODE STATUS:  Do Not Resuscitate  Summer Lal  [id number]  1/28/1924        SUBJECTIVE:  Patient is n.p.o. for bronchoscopy today at 1330.  Patient is fully vaccinated against over 19, per nursing they did not require repeat testing preprocedure if they are asymptomatic and fully vaccinated.  Eliquis has been on hold since yesterday.  There are no plans for any biopsies, bronchoscopy is therapeutic in nature only.  She does report that her wheezing is slightly better today versus yesterday.    MEDICATIONS:  SCHEDULED MEDS:  • budesonide-formoterol  2 puff Inhalation BID Resp   • ipratropium-albuterol  3 mL Nebulization Q4H Resp   • guaiFENesin  1,200 mg Oral 2 times per day   • sodium chloride (PF)  2 mL Intracatheter 2 times per day   • methylPREDNISolone (SOLU-Medrol) IV  40 mg Intravenous 4 times per day   • amLODIPine  10 mg Oral Daily   • [Held by provider] apixaBAN  2.5 mg Oral BID   • dorzolamide  1 drop Both Eyes BID   • cholecalciferol  100 mcg Oral Daily   • docusate sodium  100 mg Oral BID   • rosuvastatin  10 mg Oral QHS   • pantoprazole  40 mg Oral Daily   • losartan  50 mg Oral Daily   • levothyroxine  25 mcg Oral Daily   • latanoprost  1 drop Both Eyes Nightly       CONTINUOUS INFUSIONS:      PRN MEDS:  hydrALAZINE, ALPRAZolam, sodium chloride, benzonatate, acetaminophen, albuterol, bisacodyl, bisacodyl, polyethylene glycol, ipratropium-albuterol      REVIEW OF SYSTEMS            Review of Systems   HENT: Negative.  Negative for congestion.    Eyes: Negative.  Negative for visual disturbance.   Respiratory: Positive for cough, shortness of breath and wheezing. Negative for stridor.    Cardiovascular: Negative.  Negative for chest pain, palpitations and leg swelling.   Gastrointestinal: Negative.  Negative for abdominal 
BP improved. See EPIC. Pt voice no c/o's at this time. IV and telemetry DC.   
Discharge instructions given. Pt verbalized understanding.   
End of Shift Note    Bedside shift change report given to    (oncoming nurse) by Norma Gtz RN .        Shift worked:  NIGHTS   Shift summary and any significant changes:     NEW ADMIT   STROKE R/O        Concerns for physician to address:     Zone phone for oncoming shift:   [phone]     Patient Information  Young Marie Jr  74 y.o.  12/23/2024  3:17 PM by Radha Fung MD. Young Marie Jr was admitted from [facility]    Problem List  Patient Active Problem List    Diagnosis Date Noted    CVA (cerebrovascular accident due to intracerebral hemorrhage) ([facility]) 12/23/2024    CHF (congestive heart failure), NYHA class I, acute on chronic, combined ([facility]) 09/21/2023    S/P left atrial appendage ligation 08/28/2023    S/P ablation operation for arrhythmia 08/28/2023    Venous insufficiency of both lower extremities 03/09/2021    Leg swelling 03/09/2021    Status post placement of implantable loop recorder 07/14/2020    Hypertrophic cardiomyopathy ([facility]) 12/03/2014    Mixed hyperlipidemia 11/12/2014    HTN (hypertension) 11/12/2014     Past Medical History:   Diagnosis Date    ABRAM (acute kidney injury) ([facility]) 08/31/2023    Arrhythmia     a fib    Atrial fibrillation ([facility])     S/p albation, left atrial appendage surgery    Cancer ([facility])     skin, throat cancer    Congestive heart failure ([facility]) 11/2020    hosp at [facility]    CVA (cerebrovascular accident due to intracerebral hemorrhage) ([facility]) 12/23/2024    G tube feedings ([facility])     PEG    High cholesterol     Hypertension     Hypertrophic cardiomyopathy ([facility])     per cardiology notes 6/2013    Long term current use of anticoagulant therapy     MI (myocardial infarction) ([facility])     per pt, was told he had MI over 10 years    Status post chemoradiation     completed 12/2013    Throat cancer ([facility])     Viral meningitis        Core Measures:  CVA: yes  CHF: no  PNA: no    Activity:Level of Assistance: Independent  Number times ambulated in hallways past shift:   Number of times OOB to 
OCCUPATIONAL THERAPY EVALUATION/DISCHARGE  Patient: Young Marie Jr (74 y.o. male)  Date: 12/24/2024  Primary Diagnosis: Essential hypertension [I10]  CVA (cerebrovascular accident due to intracerebral hemorrhage) ([facility]) [I61.9]  Retinal artery branch occlusion of left eye [H34.232]  Cerebrovascular accident (CVA), unspecified mechanism ([facility]) [I63.9]         Precautions:                    ASSESSMENT :  Based on the objective data below, the patient presents with reports of lower left visual field cut and no other functional deficits, with pt verbalizing/demonstrating good understanding of environmental scanning during higher level mobility/balance challenges.  Pt noted to be completing dynamic ADLs, without DME, with [place], with no LOB noted during ground item recovery.  Pt educated on BE-FAST and verbalizes good understanding.  Given pt Independent with ADLs, no further acute OT needs identified, with OT answering pt's questions/concerns prior to D/C and pt thanking therapist for his efforts.    Of note, Fugl-Sauer not indicated given no noted UE functional deficits.    Functional Outcome Measure:  The patient scored 100/100 on the Barthel Index outcome measure.      Further skilled acute occupational therapy is not indicated at this time.     PLAN :  Recommend with staff: OOB meals, Active ADL engagement    Recommendation for discharge: (in order for the patient to meet his/her long term goals):   No skilled occupational therapy    Other factors to consider for discharge:  L lower visual field cut    IF patient discharges home will need the following DME: none     SUBJECTIVE:   Patient stated, “I appreciate your help.”    OBJECTIVE DATA SUMMARY:     Past Medical History:   Diagnosis Date    ABRAM (acute kidney injury) ([facility]) 08/31/2023    Arrhythmia     a fib    Atrial fibrillation ([facility])     S/p albation, left atrial appendage surgery    Cancer ([facility])     skin, throat cancer    Congestive heart failure ([facility]) 
Occupational Therapy    Order's acknowledged, chart reviewed in prep for skilled OT evaluation; however, pt [facility] for MRI.  OT to hold and continue to follow.    Thank you,  Matthew Kerley, OT    
Physical Therapy     Chart reviewed up to date and orders acknowledged. Attempted to see pt for PT eval, pt [facility] for MRI and not available. Will defer and follow up later as able.     Lindy Oconnell PT, DPT, NCS  
Pt received his Lipitor dose since pharmacy is already closed. Pt's ride still 30 min away.   
Pt spoke to the attending and request to be discharge. Discharge order is in, but is pending BP improvement. Pt was medicated. Family/friend can pick him up after 2045.   
Pt was able to complete his oral care independently and got dressed. Waiting on his ride. No c/o's.   
Speech Pathology Note    Reviewed chart and note SLP orders received as part of stroke order set. Pertinent medical information below. Discussed case with RN who reported no SLP-related concerns. Introduced self and role of SLP to patient. Patient denied any decline in motor speech, language, cognitive, or swallowing function. Note MRI pending at this time.    Given pertinent medical information as below, discussion with RN, and discussion with patient, formal SLP evaluation not clinically indicated at this time. Will sign off. Please re-consult if further needs arise. Thank you.    Chief Complaint   Patient presents with    Referral - General     Patient ambulatory to triage w c/o going half blind yesterday, MD referred him over for branch retinal artery occlusion of the L eye.     Presenting symptoms: Vision loss in LL visual field    RN flowsheet documentation   JOSE NURSING DYSPHAGIA SCREEN NIHSS   Swallow Screening  Is the patient unable to remain alert for testing?: No  Is the patient on a modified diet (thickened liquids) due to pre-existing dysphagia?: No  Is there presence of existing enteral tube feeding via the stomach or nose?: No  Is there presence of head-of-bed restrictions (less than 30 degrees)?: No  Is there presence of tracheotomy tube?: No  Is the patient ordered nothing-by-mouth status?: No  3 oz Water Swallow Screen: Pass      Current diet:ADULT DIET; Regular NIHSS Stroke Scale  NIH Stroke Scale Assessed: Yes  Interval: Reassessment  Level of Consciousness (1a): Alert  LOC Questions (1b): Answers both correctly  LOC Commands (1c): Performs both tasks correctly  Best Gaze (2): Normal  Visual (3): No visual loss  Facial Palsy (4): Normal symmetrical movement  Motor Arm, Left (5a): No drift  Motor Arm, Right (5b): No drift  Motor Leg, Left (6a): No drift  Motor Leg, Right (6b): No drift  Limb Ataxia (7): Absent  Sensory (8): Normal  Best Language (9): No aphasia  Dysarthria (10): 
distention, abdominal pain and vomiting.   Genitourinary: Negative.  Negative for difficulty urinating.   Musculoskeletal: Negative.  Negative for arthralgias.   Neurological: Negative.  Negative for dizziness, weakness, light-headedness and headaches.   Hematological: Negative.  Negative for adenopathy.   Psychiatric/Behavioral: Negative.  Negative for confusion.          OBJECTIVE:  VITAL SIGNS:  Vital Last Value 24 Hour Range   Temperature 98 °F (36.7 °C) (06/09/21 0500) Temp  Min: 97.9 °F (36.6 °C)  Max: 98.1 °F (36.7 °C)   Pulse 88 (06/09/21 0500) Pulse  Min: 81  Max: 88   Respiratory 20 (06/09/21 0500) Resp  Min: 20  Max: 22   Non-Invasive  Blood Pressure (!) 162/78 (06/09/21 0500) BP  Min: 153/73  Max: 164/66   Pulse Oximetry 95 % (06/09/21 0500) SpO2  Min: 95 %  Max: 100 %     Vital Today Admitted   Weight 49 kg (108 lb 0.4 oz) (06/04/21 0930) Weight: 49 kg (108 lb 0.4 oz) (06/04/21 0930)   Height N/A Height: 5' 3\" (160 cm) (06/04/21 0930)   BMI N/A BMI (Calculated): 19.14 (06/04/21 0930)         VENT SETTINGS LAST 24 HOURS:       HEMODYNAMIC SETTINGS LAST 24 HOURS:       PHYSICAL EXAMINATION:  Physical Exam  Vitals reviewed.   Constitutional:       Appearance: Normal appearance.      Comments: Thin, frail   HENT:      Head: Normocephalic and atraumatic.      Nose: Nose normal.      Mouth/Throat:      Mouth: Mucous membranes are moist.   Eyes:      Extraocular Movements: Extraocular movements intact.      Conjunctiva/sclera: Conjunctivae normal.      Pupils: Pupils are equal, round, and reactive to light.   Cardiovascular:      Rate and Rhythm: Regular rhythm. Tachycardia present.   Pulmonary:      Effort: Pulmonary effort is normal.      Breath sounds: Wheezing present.      Comments: Conversational dyspnea  Abdominal:      General: Abdomen is flat. Bowel sounds are normal.      Palpations: Abdomen is soft.   Musculoskeletal:         General: No swelling or tenderness. Normal range of motion.      Cervical 
back: Normal range of motion and neck supple.   Skin:     General: Skin is warm and dry.      Capillary Refill: Capillary refill takes less than 2 seconds.   Neurological:      General: No focal deficit present.      Mental Status: She is alert and oriented to person, place, and time. Mental status is at baseline.   Psychiatric:         Mood and Affect: Mood normal.         Behavior: Behavior normal.          LABORATORY DATA:    Labs   Recent Labs   Lab 06/05/21  0649 06/04/21  0431   WBC 6.6 7.7   HCT 36.3 33.5*   HGB 10.9* 10.4*    209   SODIUM 143 144   POTASSIUM 4.2 3.7   CHLORIDE 106 107   CO2 21 25   GLUCOSE 162* 116*   BUN 32* 28*   CREATININE 1.20* 1.14*        IMAGING STUDIES:    No results found.      IMPRESSION:   Acute hypoxic respiratory failure due to acute COPD exacerbation, resolved  Hypertension, essential  Dyslipidemia  Chronic atrial fibrillation, controlled ventricular response  Hypothyroidism  Anxiety  History of colon CA  Atrial fibrillation    PLAN:   Wheezing is slightly improving  Repeat BMP to evaluate electrolytes preprocedure, patient has been getting scheduled duo nebs  Bronchoscopy planned for today at 1330, Eliquis has been on hold since yesterday, no biopsies are planned  Patient has not had pulmonary function testing, cannot speculate on severity of COPD at this time  Continue scheduled duo nebs, IV methylprednisolone-not ready for conversion to p.o. steroids  Blood cultures x2 with no growth to date  Continue apixaban for anticoagulation-full resume post bronchoscopy   Symbicort daily for maintenance medication  Discussed with SUSIE Potter, all questions answered    SHILO Watkins    
as this relates to his diagnosis of L occipital CVA.  He demonstrated good  understanding as evidenced by verbal confirmation and asking questions.    Patient and/or family was verbally and visually educated on the BE FAST acronym for signs/symptoms of CVA and TIA.  All questions answered with patient indicating good  understanding.     Madison Balance Test:    Madison Balance Scale  1. Sitting to Standing: Able to stand without using hands and stabilize independently  2. Standing Unsupported: Able to stand safely for 2 minutes  3. Sitting with Back Unsupported but Feet Supported on Floor or on a Stool: Able to sit safely and securely for 2 minutes  4. Standing to Sitting: Sits safely with minimal use of hands  5.  Transfers: Able to transfer safely with minor use of hands  6. Standing Unsupported with Eyes Closed: Able to stand 10 seconds safely  7. Standing Unsupported with Feet Together: Able to place feet together independently and stand 1 minute safely  8. Reach Forward with Outstretched Arm While Standing: Can reach forward 12 cm (5 inches)  9.  Object from Floor from a Standing Position: Able to  slipper safely and easily  10. Turning to Look Behind Over Left and Right Shoulders While Standing: Looks behind from both sides and weight shifts well  11. Turn 360 Degrees: Able to turn 360 degrees safely in 4 seconds or less  12. Place Alternate Foot on Step or Stool While Standing Unsupported: Able to stand independently and complete 8 steps in greater than 20 seconds  13. Standing Unsupported One Foot in Front: Able to place foot ahead independently and hold 30 seconds  14. Standing on One Leg: Able to lift leg independently and hold 5-10 seconds  Madison Balance Score: 52         56=Maximum possible score;   0-20=High fall risk  21-40=Moderate fall risk   41-56=Low fall risk

## 2025-01-15 NOTE — PROGRESS NOTES
Chief Complaint   Patient presents with    Follow-Up from Hospital     CVA.  Patient reports vision loss in left eye      Vitals:    01/15/25 1323   BP: 128/80   Pulse: 68   Resp: 17   SpO2: 98%      
chest. Coronal and  sagittal reformats were generated.  CT dose reduction was achieved through use  of a standardized protocol tailored for this examination and automatic exposure  control for dose modulation.    The absence of intravenous contrast reduces the sensitivity for evaluation of  the mediastinum, brandan, vasculature, and upper abdominal organs.    FINDINGS:    CHEST WALL: No mass or axillary lymphadenopathy.  MEDIASTINUM: No mass or lymphadenopathy.  BRANDAN: No mass or lymphadenopathy.  THORACIC AORTA: No aneurysm.  MAIN PULMONARY ARTERY: Normal in caliber.  TRACHEA/BRONCHI: Patent.  ESOPHAGUS: No wall thickening or dilatation.  HEART: Normal in size. Coronary artery calcium: present  PLEURA: Trace bilateral pleural effusions are noted.  LUNGS: Stable chronic right middle lobe volume loss, unchanged 9 mm pulmonary  nodule. Bibasilar atelectasis.  INCIDENTALLY IMAGED UPPER ABDOMEN: Gallstone.  BONES: No destructive bone lesion.    Impression  Stable chronic right middle lobe volume loss with an unchanged 9 mm pulmonary  nodule. Continuing close follow-up is recommended.    Electronically signed by LIANA PARISH        MRI Results (maximum last 3):  MRI Result (most recent):  MRI BRAIN WO CONTRAST 12/24/2024    Narrative  INDICATION:   Retinal artery occlusion    EXAMINATION:  MRI BRAIN WO CONTRAST    COMPARISON:   12/23/2024    TECHNIQUE:  Multiplanar multisequence MRI acquisition without contrast of the  brain.    FINDINGS:    Ventricles:  Midline, no hydrocephalus.  Brain Parenchyma/Brainstem: Small area of chronic infarction superior left  cerebellum. Mild chronic supratentorial white matter signal abnormalities and to  a lesser degree involving the debbie. Several small foci of acute infarction left  occipital lobe. Small focus of diffusion signal in the deep left frontal lobe  white matter likely T2 shine through artifact.  Intracranial Hemorrhage: Chronic microhemorrhage left midbrain and ventral

## 2025-01-20 ENCOUNTER — ANESTHESIA EVENT (OUTPATIENT)
Facility: HOSPITAL | Age: 75
DRG: 035 | End: 2025-01-20
Payer: MEDICARE

## 2025-01-20 ENCOUNTER — APPOINTMENT (OUTPATIENT)
Facility: HOSPITAL | Age: 75
DRG: 035 | End: 2025-01-20
Attending: SURGERY
Payer: MEDICARE

## 2025-01-20 ENCOUNTER — ANESTHESIA (OUTPATIENT)
Facility: HOSPITAL | Age: 75
DRG: 035 | End: 2025-01-20
Payer: MEDICARE

## 2025-01-20 ENCOUNTER — HOSPITAL ENCOUNTER (INPATIENT)
Facility: HOSPITAL | Age: 75
LOS: 1 days | Discharge: HOME | DRG: 035 | End: 2025-01-21
Attending: SURGERY | Admitting: SURGERY
Payer: MEDICARE

## 2025-01-20 PROBLEM — I65.22 CAROTID STENOSIS, LEFT: Status: ACTIVE | Noted: 2025-01-20

## 2025-01-20 LAB — ACT BLD: 227 SECS (ref 79–138)

## 2025-01-20 PROCEDURE — 3600000007 HC SURGERY HYBRID BASE: Performed by: SURGERY

## 2025-01-20 PROCEDURE — 2500000003 HC RX 250 WO HCPCS: Performed by: NURSE ANESTHETIST, CERTIFIED REGISTERED

## 2025-01-20 PROCEDURE — C1884 EMBOLIZATION PROTECT SYST: HCPCS | Performed by: SURGERY

## 2025-01-20 PROCEDURE — 2720000010 HC SURG SUPPLY STERILE: Performed by: SURGERY

## 2025-01-20 PROCEDURE — 2580000003 HC RX 258: Performed by: ANESTHESIOLOGY

## 2025-01-20 PROCEDURE — 7100000000 HC PACU RECOVERY - FIRST 15 MIN: Performed by: SURGERY

## 2025-01-20 PROCEDURE — 6360000002 HC RX W HCPCS: Performed by: SURGERY

## 2025-01-20 PROCEDURE — 3700000001 HC ADD 15 MINUTES (ANESTHESIA): Performed by: SURGERY

## 2025-01-20 PROCEDURE — 85347 COAGULATION TIME ACTIVATED: CPT

## 2025-01-20 PROCEDURE — 2580000003 HC RX 258: Performed by: SURGERY

## 2025-01-20 PROCEDURE — C1725 CATH, TRANSLUMIN NON-LASER: HCPCS | Performed by: SURGERY

## 2025-01-20 PROCEDURE — C1769 GUIDE WIRE: HCPCS | Performed by: SURGERY

## 2025-01-20 PROCEDURE — 6360000004 HC RX CONTRAST MEDICATION: Performed by: SURGERY

## 2025-01-20 PROCEDURE — 037J3DZ DILATION OF LEFT COMMON CAROTID ARTERY WITH INTRALUMINAL DEVICE, PERCUTANEOUS APPROACH: ICD-10-PCS | Performed by: SURGERY

## 2025-01-20 PROCEDURE — 6360000002 HC RX W HCPCS: Performed by: NURSE ANESTHETIST, CERTIFIED REGISTERED

## 2025-01-20 PROCEDURE — 7100000001 HC PACU RECOVERY - ADDTL 15 MIN: Performed by: SURGERY

## 2025-01-20 PROCEDURE — 2709999900 HC NON-CHARGEABLE SUPPLY: Performed by: SURGERY

## 2025-01-20 PROCEDURE — 2500000003 HC RX 250 WO HCPCS: Performed by: SURGERY

## 2025-01-20 PROCEDURE — 2060000000 HC ICU INTERMEDIATE R&B

## 2025-01-20 PROCEDURE — 3700000000 HC ANESTHESIA ATTENDED CARE: Performed by: SURGERY

## 2025-01-20 PROCEDURE — C1894 INTRO/SHEATH, NON-LASER: HCPCS | Performed by: SURGERY

## 2025-01-20 PROCEDURE — 76000 FLUOROSCOPY <1 HR PHYS/QHP: CPT

## 2025-01-20 PROCEDURE — C1876 STENT, NON-COA/NON-COV W/DEL: HCPCS | Performed by: SURGERY

## 2025-01-20 PROCEDURE — 6370000000 HC RX 637 (ALT 250 FOR IP): Performed by: SURGERY

## 2025-01-20 PROCEDURE — 2580000003 HC RX 258: Performed by: NURSE ANESTHETIST, CERTIFIED REGISTERED

## 2025-01-20 PROCEDURE — 72040 X-RAY EXAM NECK SPINE 2-3 VW: CPT

## 2025-01-20 PROCEDURE — 3600000017 HC SURGERY HYBRID ADDL 15MIN: Performed by: SURGERY

## 2025-01-20 DEVICE — 8 MM X 40 MM
Type: IMPLANTABLE DEVICE | Site: OTHER | Status: FUNCTIONAL
Brand: ENROUTE TRANSCAROTID STENT

## 2025-01-20 RX ORDER — ASPIRIN 81 MG/1
81 TABLET ORAL DAILY
Status: DISCONTINUED | OUTPATIENT
Start: 2025-01-20 | End: 2025-01-20 | Stop reason: SDUPTHER

## 2025-01-20 RX ORDER — ASPIRIN 81 MG/1
81 TABLET, CHEWABLE ORAL DAILY
Status: DISCONTINUED | OUTPATIENT
Start: 2025-01-20 | End: 2025-01-20 | Stop reason: SDUPTHER

## 2025-01-20 RX ORDER — DEXAMETHASONE SODIUM PHOSPHATE 4 MG/ML
INJECTION, SOLUTION INTRA-ARTICULAR; INTRALESIONAL; INTRAMUSCULAR; INTRAVENOUS; SOFT TISSUE
Status: DISCONTINUED | OUTPATIENT
Start: 2025-01-20 | End: 2025-01-20 | Stop reason: SDUPTHER

## 2025-01-20 RX ORDER — GLYCOPYRROLATE 0.2 MG/ML
INJECTION INTRAMUSCULAR; INTRAVENOUS
Status: DISCONTINUED | OUTPATIENT
Start: 2025-01-20 | End: 2025-01-20 | Stop reason: SDUPTHER

## 2025-01-20 RX ORDER — SODIUM CHLORIDE 9 MG/ML
INJECTION, SOLUTION INTRAVENOUS CONTINUOUS
Status: DISCONTINUED | OUTPATIENT
Start: 2025-01-20 | End: 2025-01-21

## 2025-01-20 RX ORDER — PROCHLORPERAZINE EDISYLATE 5 MG/ML
5 INJECTION INTRAMUSCULAR; INTRAVENOUS
Status: DISCONTINUED | OUTPATIENT
Start: 2025-01-20 | End: 2025-01-20 | Stop reason: HOSPADM

## 2025-01-20 RX ORDER — ATROPINE SULFATE 0.4 MG/ML
INJECTION, SOLUTION INTRAMUSCULAR; INTRAVENOUS; SUBCUTANEOUS
Status: DISCONTINUED | OUTPATIENT
Start: 2025-01-20 | End: 2025-01-20 | Stop reason: SDUPTHER

## 2025-01-20 RX ORDER — ONDANSETRON 4 MG/1
4 TABLET, ORALLY DISINTEGRATING ORAL EVERY 8 HOURS PRN
Status: DISCONTINUED | OUTPATIENT
Start: 2025-01-20 | End: 2025-01-21 | Stop reason: HOSPADM

## 2025-01-20 RX ORDER — FENTANYL CITRATE 50 UG/ML
INJECTION, SOLUTION INTRAMUSCULAR; INTRAVENOUS
Status: DISCONTINUED | OUTPATIENT
Start: 2025-01-20 | End: 2025-01-20 | Stop reason: SDUPTHER

## 2025-01-20 RX ORDER — HEPARIN SODIUM 1000 [USP'U]/ML
INJECTION, SOLUTION INTRAVENOUS; SUBCUTANEOUS
Status: DISCONTINUED | OUTPATIENT
Start: 2025-01-20 | End: 2025-01-20 | Stop reason: SDUPTHER

## 2025-01-20 RX ORDER — SODIUM CHLORIDE 0.9 % (FLUSH) 0.9 %
5-40 SYRINGE (ML) INJECTION PRN
Status: DISCONTINUED | OUTPATIENT
Start: 2025-01-20 | End: 2025-01-21 | Stop reason: HOSPADM

## 2025-01-20 RX ORDER — SODIUM CHLORIDE, SODIUM LACTATE, POTASSIUM CHLORIDE, CALCIUM CHLORIDE 600; 310; 30; 20 MG/100ML; MG/100ML; MG/100ML; MG/100ML
INJECTION, SOLUTION INTRAVENOUS CONTINUOUS
Status: DISCONTINUED | OUTPATIENT
Start: 2025-01-20 | End: 2025-01-20 | Stop reason: HOSPADM

## 2025-01-20 RX ORDER — METOPROLOL SUCCINATE 25 MG/1
25 TABLET, EXTENDED RELEASE ORAL DAILY
Status: DISCONTINUED | OUTPATIENT
Start: 2025-01-20 | End: 2025-01-21 | Stop reason: HOSPADM

## 2025-01-20 RX ORDER — ONDANSETRON 2 MG/ML
4 INJECTION INTRAMUSCULAR; INTRAVENOUS EVERY 6 HOURS PRN
Status: DISCONTINUED | OUTPATIENT
Start: 2025-01-20 | End: 2025-01-21 | Stop reason: HOSPADM

## 2025-01-20 RX ORDER — EPHEDRINE SULFATE/0.9% NACL/PF 50 MG/5 ML
SYRINGE (ML) INTRAVENOUS
Status: DISCONTINUED | OUTPATIENT
Start: 2025-01-20 | End: 2025-01-20 | Stop reason: SDUPTHER

## 2025-01-20 RX ORDER — SODIUM CHLORIDE, SODIUM LACTATE, POTASSIUM CHLORIDE, CALCIUM CHLORIDE 600; 310; 30; 20 MG/100ML; MG/100ML; MG/100ML; MG/100ML
INJECTION, SOLUTION INTRAVENOUS
Status: DISCONTINUED | OUTPATIENT
Start: 2025-01-20 | End: 2025-01-20 | Stop reason: SDUPTHER

## 2025-01-20 RX ORDER — OXYCODONE HYDROCHLORIDE 5 MG/1
10 TABLET ORAL EVERY 4 HOURS PRN
Status: DISCONTINUED | OUTPATIENT
Start: 2025-01-20 | End: 2025-01-21

## 2025-01-20 RX ORDER — OXYCODONE HYDROCHLORIDE 5 MG/1
5 TABLET ORAL EVERY 4 HOURS PRN
Status: DISCONTINUED | OUTPATIENT
Start: 2025-01-20 | End: 2025-01-21

## 2025-01-20 RX ORDER — DEXAMETHASONE SODIUM PHOSPHATE 4 MG/ML
4 INJECTION, SOLUTION INTRA-ARTICULAR; INTRALESIONAL; INTRAMUSCULAR; INTRAVENOUS; SOFT TISSUE
Status: DISCONTINUED | OUTPATIENT
Start: 2025-01-20 | End: 2025-01-20 | Stop reason: HOSPADM

## 2025-01-20 RX ORDER — HYDROMORPHONE HYDROCHLORIDE 1 MG/ML
0.25 INJECTION, SOLUTION INTRAMUSCULAR; INTRAVENOUS; SUBCUTANEOUS EVERY 5 MIN PRN
Status: DISCONTINUED | OUTPATIENT
Start: 2025-01-20 | End: 2025-01-20 | Stop reason: HOSPADM

## 2025-01-20 RX ORDER — ACETAMINOPHEN 325 MG/1
650 TABLET ORAL EVERY 4 HOURS PRN
Status: DISCONTINUED | OUTPATIENT
Start: 2025-01-20 | End: 2025-01-21 | Stop reason: HOSPADM

## 2025-01-20 RX ORDER — CLOPIDOGREL BISULFATE 75 MG/1
75 TABLET ORAL DAILY
Status: DISCONTINUED | OUTPATIENT
Start: 2025-01-20 | End: 2025-01-21 | Stop reason: HOSPADM

## 2025-01-20 RX ORDER — LIDOCAINE HYDROCHLORIDE 20 MG/ML
INJECTION, SOLUTION EPIDURAL; INFILTRATION; INTRACAUDAL; PERINEURAL
Status: DISCONTINUED | OUTPATIENT
Start: 2025-01-20 | End: 2025-01-20 | Stop reason: SDUPTHER

## 2025-01-20 RX ORDER — ROCURONIUM BROMIDE 10 MG/ML
INJECTION, SOLUTION INTRAVENOUS
Status: DISCONTINUED | OUTPATIENT
Start: 2025-01-20 | End: 2025-01-20 | Stop reason: SDUPTHER

## 2025-01-20 RX ORDER — CLOPIDOGREL BISULFATE 75 MG/1
75 TABLET ORAL DAILY
Status: DISCONTINUED | OUTPATIENT
Start: 2025-01-20 | End: 2025-01-20 | Stop reason: SDUPTHER

## 2025-01-20 RX ORDER — ACETAMINOPHEN 325 MG/1
650 TABLET ORAL
Status: DISCONTINUED | OUTPATIENT
Start: 2025-01-20 | End: 2025-01-20 | Stop reason: HOSPADM

## 2025-01-20 RX ORDER — SODIUM CHLORIDE 0.9 % (FLUSH) 0.9 %
5-40 SYRINGE (ML) INJECTION PRN
Status: DISCONTINUED | OUTPATIENT
Start: 2025-01-20 | End: 2025-01-20 | Stop reason: HOSPADM

## 2025-01-20 RX ORDER — SPIRONOLACTONE 25 MG/1
25 TABLET ORAL DAILY
Status: DISCONTINUED | OUTPATIENT
Start: 2025-01-20 | End: 2025-01-21 | Stop reason: HOSPADM

## 2025-01-20 RX ORDER — HYDRALAZINE HYDROCHLORIDE 20 MG/ML
10 INJECTION INTRAMUSCULAR; INTRAVENOUS
Status: DISCONTINUED | OUTPATIENT
Start: 2025-01-20 | End: 2025-01-20 | Stop reason: HOSPADM

## 2025-01-20 RX ORDER — SODIUM CHLORIDE 9 MG/ML
INJECTION, SOLUTION INTRAVENOUS PRN
Status: DISCONTINUED | OUTPATIENT
Start: 2025-01-20 | End: 2025-01-20 | Stop reason: HOSPADM

## 2025-01-20 RX ORDER — PROTAMINE SULFATE 10 MG/ML
INJECTION, SOLUTION INTRAVENOUS
Status: DISCONTINUED | OUTPATIENT
Start: 2025-01-20 | End: 2025-01-20 | Stop reason: SDUPTHER

## 2025-01-20 RX ORDER — NALOXONE HYDROCHLORIDE 0.4 MG/ML
INJECTION, SOLUTION INTRAMUSCULAR; INTRAVENOUS; SUBCUTANEOUS PRN
Status: DISCONTINUED | OUTPATIENT
Start: 2025-01-20 | End: 2025-01-20 | Stop reason: HOSPADM

## 2025-01-20 RX ORDER — SODIUM CHLORIDE 9 MG/ML
INJECTION, SOLUTION INTRAVENOUS PRN
Status: DISCONTINUED | OUTPATIENT
Start: 2025-01-20 | End: 2025-01-21

## 2025-01-20 RX ORDER — ONDANSETRON 2 MG/ML
INJECTION INTRAMUSCULAR; INTRAVENOUS
Status: DISCONTINUED | OUTPATIENT
Start: 2025-01-20 | End: 2025-01-20 | Stop reason: SDUPTHER

## 2025-01-20 RX ORDER — DILTIAZEM HYDROCHLORIDE 240 MG/1
240 CAPSULE, COATED, EXTENDED RELEASE ORAL DAILY
Status: DISCONTINUED | OUTPATIENT
Start: 2025-01-20 | End: 2025-01-21 | Stop reason: HOSPADM

## 2025-01-20 RX ORDER — ASPIRIN 81 MG/1
81 TABLET ORAL DAILY
Status: DISCONTINUED | OUTPATIENT
Start: 2025-01-20 | End: 2025-01-21 | Stop reason: HOSPADM

## 2025-01-20 RX ORDER — SODIUM CHLORIDE 0.9 % (FLUSH) 0.9 %
5-40 SYRINGE (ML) INJECTION EVERY 12 HOURS SCHEDULED
Status: DISCONTINUED | OUTPATIENT
Start: 2025-01-20 | End: 2025-01-21

## 2025-01-20 RX ORDER — PROPOFOL 10 MG/ML
INJECTION, EMULSION INTRAVENOUS
Status: DISCONTINUED | OUTPATIENT
Start: 2025-01-20 | End: 2025-01-20 | Stop reason: SDUPTHER

## 2025-01-20 RX ORDER — LOSARTAN POTASSIUM 100 MG/1
100 TABLET ORAL DAILY
Status: DISCONTINUED | OUTPATIENT
Start: 2025-01-20 | End: 2025-01-21 | Stop reason: HOSPADM

## 2025-01-20 RX ORDER — OXYCODONE HYDROCHLORIDE 5 MG/1
5 TABLET ORAL
Status: DISCONTINUED | OUTPATIENT
Start: 2025-01-20 | End: 2025-01-20 | Stop reason: HOSPADM

## 2025-01-20 RX ORDER — FENTANYL CITRATE 50 UG/ML
25 INJECTION, SOLUTION INTRAMUSCULAR; INTRAVENOUS EVERY 5 MIN PRN
Status: DISCONTINUED | OUTPATIENT
Start: 2025-01-20 | End: 2025-01-20 | Stop reason: HOSPADM

## 2025-01-20 RX ORDER — SODIUM CHLORIDE 0.9 % (FLUSH) 0.9 %
5-40 SYRINGE (ML) INJECTION EVERY 12 HOURS SCHEDULED
Status: DISCONTINUED | OUTPATIENT
Start: 2025-01-20 | End: 2025-01-20 | Stop reason: HOSPADM

## 2025-01-20 RX ORDER — IOPAMIDOL 755 MG/ML
INJECTION, SOLUTION INTRAVASCULAR PRN
Status: DISCONTINUED | OUTPATIENT
Start: 2025-01-20 | End: 2025-01-20 | Stop reason: ALTCHOICE

## 2025-01-20 RX ORDER — SUCCINYLCHOLINE/SOD CL,ISO/PF 200MG/10ML
SYRINGE (ML) INTRAVENOUS
Status: DISCONTINUED | OUTPATIENT
Start: 2025-01-20 | End: 2025-01-20 | Stop reason: SDUPTHER

## 2025-01-20 RX ORDER — ATORVASTATIN CALCIUM 40 MG/1
80 TABLET, FILM COATED ORAL NIGHTLY
Status: DISCONTINUED | OUTPATIENT
Start: 2025-01-20 | End: 2025-01-21 | Stop reason: HOSPADM

## 2025-01-20 RX ORDER — FUROSEMIDE 40 MG/1
40 TABLET ORAL DAILY
Status: DISCONTINUED | OUTPATIENT
Start: 2025-01-20 | End: 2025-01-21 | Stop reason: HOSPADM

## 2025-01-20 RX ADMIN — CLOPIDOGREL BISULFATE 75 MG: 75 TABLET ORAL at 10:31

## 2025-01-20 RX ADMIN — Medication 10 MG: at 11:19

## 2025-01-20 RX ADMIN — SODIUM CHLORIDE 5 MG/HR: 9 INJECTION, SOLUTION INTRAVENOUS at 15:48

## 2025-01-20 RX ADMIN — SODIUM CHLORIDE, POTASSIUM CHLORIDE, SODIUM LACTATE AND CALCIUM CHLORIDE: 600; 310; 30; 20 INJECTION, SOLUTION INTRAVENOUS at 11:10

## 2025-01-20 RX ADMIN — ROCURONIUM BROMIDE 30 MG: 10 INJECTION INTRAVENOUS at 11:22

## 2025-01-20 RX ADMIN — ROCURONIUM BROMIDE 5 MG: 10 INJECTION INTRAVENOUS at 11:00

## 2025-01-20 RX ADMIN — FENTANYL CITRATE 50 MCG: 50 INJECTION, SOLUTION INTRAMUSCULAR; INTRAVENOUS at 10:53

## 2025-01-20 RX ADMIN — PHENYLEPHRINE HYDROCHLORIDE 50 MCG/MIN: 10 INJECTION INTRAVENOUS at 11:11

## 2025-01-20 RX ADMIN — PROPOFOL 100 MG: 10 INJECTION, EMULSION INTRAVENOUS at 11:00

## 2025-01-20 RX ADMIN — ONDANSETRON 4 MG: 2 INJECTION INTRAMUSCULAR; INTRAVENOUS at 12:11

## 2025-01-20 RX ADMIN — SODIUM CHLORIDE, POTASSIUM CHLORIDE, SODIUM LACTATE AND CALCIUM CHLORIDE: 600; 310; 30; 20 INJECTION, SOLUTION INTRAVENOUS at 08:35

## 2025-01-20 RX ADMIN — DEXTROSE MONOHYDRATE 5 MG/HR: 50 INJECTION, SOLUTION INTRAVENOUS at 12:13

## 2025-01-20 RX ADMIN — GLYCOPYRROLATE 0.2 MG: 0.2 INJECTION, SOLUTION INTRAMUSCULAR; INTRAVENOUS at 11:52

## 2025-01-20 RX ADMIN — FENTANYL CITRATE 50 MCG: 50 INJECTION, SOLUTION INTRAMUSCULAR; INTRAVENOUS at 12:10

## 2025-01-20 RX ADMIN — DEXAMETHASONE SODIUM PHOSPHATE 4 MG: 4 INJECTION, SOLUTION INTRAMUSCULAR; INTRAVENOUS at 11:10

## 2025-01-20 RX ADMIN — LIDOCAINE HYDROCHLORIDE 100 MG: 20 INJECTION, SOLUTION EPIDURAL; INFILTRATION; INTRACAUDAL; PERINEURAL at 11:00

## 2025-01-20 RX ADMIN — ATROPINE SULFATE 0.2 MCG: 0.4 INJECTION, SOLUTION INTRAMUSCULAR; INTRAVENOUS; SUBCUTANEOUS at 12:03

## 2025-01-20 RX ADMIN — ROCURONIUM BROMIDE 45 MG: 10 INJECTION INTRAVENOUS at 11:08

## 2025-01-20 RX ADMIN — PROTAMINE SULFATE 25 MG: 10 INJECTION, SOLUTION INTRAVENOUS at 12:08

## 2025-01-20 RX ADMIN — GLYCOPYRROLATE 0.2 MG: 0.2 INJECTION, SOLUTION INTRAMUSCULAR; INTRAVENOUS at 11:51

## 2025-01-20 RX ADMIN — GLYCOPYRROLATE 0.2 MG: 0.2 INJECTION, SOLUTION INTRAMUSCULAR; INTRAVENOUS at 11:11

## 2025-01-20 RX ADMIN — WATER 2000 MG: 1 INJECTION INTRAMUSCULAR; INTRAVENOUS; SUBCUTANEOUS at 11:15

## 2025-01-20 RX ADMIN — GLYCOPYRROLATE 0.2 MG: 0.2 INJECTION, SOLUTION INTRAMUSCULAR; INTRAVENOUS at 11:17

## 2025-01-20 RX ADMIN — Medication 5 MG: at 11:39

## 2025-01-20 RX ADMIN — HEPARIN SODIUM 7000 UNITS: 1000 INJECTION, SOLUTION INTRAVENOUS; SUBCUTANEOUS at 11:33

## 2025-01-20 RX ADMIN — Medication 10 MG: at 11:13

## 2025-01-20 RX ADMIN — Medication 140 MG: at 11:01

## 2025-01-20 RX ADMIN — ASPIRIN 81 MG: 81 TABLET, COATED ORAL at 10:31

## 2025-01-20 RX ADMIN — SODIUM CHLORIDE: 9 INJECTION, SOLUTION INTRAVENOUS at 17:01

## 2025-01-20 RX ADMIN — Medication 3 AMPULE: at 08:36

## 2025-01-20 RX ADMIN — HEPARIN SODIUM 1000 UNITS: 1000 INJECTION, SOLUTION INTRAVENOUS; SUBCUTANEOUS at 11:44

## 2025-01-20 RX ADMIN — SODIUM CHLORIDE 5 MG/HR: 9 INJECTION, SOLUTION INTRAVENOUS at 13:00

## 2025-01-20 RX ADMIN — ATROPINE SULFATE 0.2 MCG: 0.4 INJECTION, SOLUTION INTRAMUSCULAR; INTRAVENOUS; SUBCUTANEOUS at 12:05

## 2025-01-20 RX ADMIN — HEPARIN SODIUM 1000 UNITS: 1000 INJECTION, SOLUTION INTRAVENOUS; SUBCUTANEOUS at 11:56

## 2025-01-20 ASSESSMENT — PAIN SCALES - GENERAL: PAINLEVEL_OUTOF10: 0

## 2025-01-20 NOTE — ANESTHESIA PRE PROCEDURE
Department of Anesthesiology  Preprocedure Note       Name:  Young Marie Jr   Age:  75 y.o.  :  1950                                          MRN:  [id number]         Date:  2025      Surgeon: Surgeon(s):  Philippe Russell MD    Procedure: Procedure(s):  LEFT TRANSCAROTID ARTERY REVASCULARIZATION (TCAR)    Medications prior to admission:   Prior to Admission medications    Medication Sig Start Date End Date Taking? Authorizing Provider   vitamin E 400 UNIT capsule Take 1 capsule by mouth daily    Haley Mcknight MD   Multiple Vitamin (MULTIVITAMIN ADULT PO) Take 1 tablet by mouth daily    Haley Mcknight MD   furosemide (LASIX) 40 MG tablet Take 1 tablet by mouth daily    Haley Mcknight MD   clopidogrel (PLAVIX) 75 MG tablet Take 1 tablet by mouth daily 25   Haley Mcknight MD   losartan (COZAAR) 100 MG tablet Take 1 tablet by mouth daily 25   Bg Walsh MD   atorvastatin (LIPITOR) 80 MG tablet Take 1 tablet by mouth nightly 24   Radha Fung MD   metoprolol succinate (TOPROL XL) 25 MG extended release tablet Take 1 tablet by mouth daily    Haley Mcknight MD   aspirin 81 MG chewable tablet Take 1 tablet by mouth daily    Haley Mcknight MD   isosorbide mononitrate (IMDUR) 60 MG extended release tablet Take 1 tablet by mouth daily 10/5/23   Bg Walsh MD   dilTIAZem (DILACOR XR) 240 MG extended release capsule Take 1 capsule by mouth daily    Haley Mcknight MD   spironolactone (ALDACTONE) 25 MG tablet Take 1 tablet by mouth daily 21   Automatic Reconciliation, Ar       Current medications:    No current facility-administered medications for this encounter.     Current Outpatient Medications   Medication Sig Dispense Refill   • vitamin E 400 UNIT capsule Take 1 capsule by mouth daily     • Multiple Vitamin (MULTIVITAMIN ADULT PO) Take 1 tablet by mouth daily     • furosemide (LASIX) 40 MG tablet Take 1 tablet by mouth

## 2025-01-20 NOTE — BRIEF OP NOTE
Brief Postoperative Note      Patient: Young Marie Jr  YOB: 1950  MRN: [id number]    Date of Procedure: 1/20/2025    Pre-Op Diagnosis: LICA stenosis    Post-Op Diagnosis: Same       Procedure(s):  LEFT TRANSCAROTID ARTERY REVASCULARIZATION (TCAR)    Surgeon(s):  Philippe Russell MD    Anesthesia: General    Estimated Blood Loss (mL): Minimal    Complications: None    Implants:  Implant Name Type Inv. Item Serial No.  Lot No. LRB No. Used Action   STENT CAR 0.014 IN 8X40 MM 5 [id number] CM [facility] [id number] Carotid stents STENT CAR 0.014 IN 8X40 MM 5 [id number] CM [facility] [id number] Left 1 Implanted         Findings: Neuro intact in PACU      Electronically signed by Philippe Russell MD on 1/20/2025 at 12:44 PM

## 2025-01-20 NOTE — FLOWSHEET NOTE
01/20/25 1147   Family Communication    Relationship to Patient Son    Phone Number Marco, [phone]   Family/Significant Other Update Updated   Delivery Origin Nurse   Family Communication   Family Update Message Surgeon working;Patient stable

## 2025-01-20 NOTE — PERIOP NOTE
1020: Received orders from Dr. Russell to give patient 81mg Aspirin and 75mg Plavix once with a sip of water.

## 2025-01-20 NOTE — ANESTHESIA POSTPROCEDURE EVALUATION
Department of Anesthesiology  Postprocedure Note    Patient: Young Marie Jr  MRN: [id number]  YOB: 1950  Date of evaluation: 1/20/2025    Procedure Summary       Date: 01/20/25 Room / Location: [facility] MAIN OR M10 / [facility] MAIN OR    Anesthesia Start: 1053 Anesthesia Stop: 1242    Procedure: LEFT TRANSCAROTID ARTERY REVASCULARIZATION (TCAR) (Left: Neck) Diagnosis:       Carotid occlusion, left      (Carotid occlusion, left [I65.22])    Providers: Philippe Russell MD Responsible Provider: Abilio Ferrara MD    Anesthesia Type: General ASA Status: 3            Anesthesia Type: General    Donna Phase I: Donna Score: 8    Donna Phase II:      Anesthesia Post Evaluation    Patient location during evaluation: PACU  Patient participation: complete - patient participated  Level of consciousness: sleepy but conscious and responsive to verbal stimuli  Pain score: 2  Airway patency: patent  Nausea & Vomiting: no vomiting and no nausea  Cardiovascular status: blood pressure returned to baseline and hemodynamically stable  Respiratory status: acceptable  Hydration status: stable  Multimodal analgesia pain management approach  Pain management: adequate    No notable events documented.

## 2025-01-20 NOTE — PROGRESS NOTES
TRANSFER - IN REPORT:    Verbal report received from VONDA Craig on Young Marie Jr  being received from PACU for routine progression of care. Report consisted of patient’s Situation, Background, Assessment and Recommendations(SBAR).  Opportunity for questions and clarification was provided. Assessment completed upon patient’s arrival to IVCU and care assumed.       PROCEDURE SITE CHECK:    Procedure site: L neck/R groin. Patient currently has no c/o pain/discomfort reported at procedure site.    1548: Patient BP SBP in the 180's. Cardene restarted at 5 mg/hr.  1718: BP 90's/40's. Cardene paused. Reassessed patient; asymptomatic. Sites clean dry and intact.   1755: 's/80's. Cardene resumed at 4 mg/hr.      End of Shift Note    Bedside shift change report given to VONDA Fiore (oncoming nurse) by Allyssa Sutherland RN (offgoing nurse).  Report included the following information SBAR, Procedure Summary, Intake/Output, MAR, and Cardiac Rhythm SR    Shift worked:  3622-5963     Shift summary and any significant changes:     See above note     Concerns for physician to address:       Zone phone for oncoming shift:   [phone]       Activity:     Number times ambulated in hallways past shift: 0  Number of times OOB to chair past shift: 0    Cardiac:   Cardiac Monitoring: Yes           Access:  Current line(s): PIV     Genitourinary:   Urinary status: voiding    Respiratory:      Chronic home O2 use?: NO  Incentive spirometer at bedside: NO       GI:     Current diet:  ADULT DIET; Clear Liquid  Passing flatus: YES  Tolerating current diet: YES       Pain Management:   Patient states pain is manageable on current regimen: N/A    Skin:     Interventions: increase time out of bed    Patient Safety:  Fall Score:    Interventions: bed/chair alarm and pt to call before getting OOB       Length of Stay:  Expected LOS: 2  Actual LOS: 0    Predictive Model Details          28 (Normal)  Factor Value    Calculated 1/20/2025 19:08 43%

## 2025-01-20 NOTE — CARE COORDINATION
Care Management Initial Assessment       RUR: 8% \"low risk\"  Readmission? Not a true readmission, pt admitted for vascular procedure  1st IM letter given? Yes, given by Patient Access Team on 1/20/25  1st  letter given: N/A    Initial note - 1550 PM: Chart reviewed. CM met with pt at the bedside to introduce self and role. Verified contact information and demographics. Pt resides alone in a one level home with 3 DELORIS. Pt PCP is Dr. Walsh with the last visit being within the last month. Preferred pharmacy is [facility]. Pt has a hx of [facility]  services. Pt has no hx of a SNF stay. Pt is independent with ADL's and has no DME needs. Pt is an active . Pt has no ACP on file; pt is a FULL code. Pt family to transport pt home upon time of d/c. Full assessment below:     01/20/25 1550   Service Assessment   Patient Orientation Alert and Oriented;Person;Place;Situation;Self   Cognition Alert   History Provided By Patient   Primary Caregiver Self   Support Systems Children   Patient's Healthcare Decision Maker is: Legal Next of Kin   PCP Verified by CM Yes  (Dr. Walsh)   Last Visit to PCP Within last 3 months   Prior Functional Level Independent in ADLs/IADLs   Current Functional Level Independent in ADLs/IADLs   Can patient return to prior living arrangement Yes   Ability to make needs known: Good   Family able to assist with home care needs: Yes   Would you like for me to discuss the discharge plan with any other family members/significant others, and if so, who? No   Financial Resources Medicare  (Medicare Part [place] and B, [place] BC[place])   Community Resources None   Social/Functional History   Lives With Alone   Type of Home House   Home Layout One level   Home Access Stairs to enter with rails   Entrance Stairs - Number of Steps 3 DELORIS   Home Equipment None   Receives Help From Family   Prior Level of Assist for ADLs Independent   Prior Level of Assist for Homemaking Independent   Ambulation Assistance

## 2025-01-21 VITALS
HEART RATE: 84 BPM | OXYGEN SATURATION: 95 % | RESPIRATION RATE: 16 BRPM | TEMPERATURE: 98.7 F | SYSTOLIC BLOOD PRESSURE: 134 MMHG | HEIGHT: 68 IN | WEIGHT: 209.66 LBS | DIASTOLIC BLOOD PRESSURE: 74 MMHG | BODY MASS INDEX: 31.78 KG/M2

## 2025-01-21 LAB — ACT BLD: 239 SECS (ref 79–138)

## 2025-01-21 PROCEDURE — 6370000000 HC RX 637 (ALT 250 FOR IP): Performed by: SURGERY

## 2025-01-21 PROCEDURE — 2500000003 HC RX 250 WO HCPCS: Performed by: SURGERY

## 2025-01-21 PROCEDURE — 6370000000 HC RX 637 (ALT 250 FOR IP): Performed by: NURSE PRACTITIONER

## 2025-01-21 RX ORDER — ASPIRIN 81 MG/1
81 TABLET ORAL DAILY
Qty: 90 TABLET | Refills: 3 | Status: SHIPPED | OUTPATIENT
Start: 2025-01-22

## 2025-01-21 RX ORDER — ATORVASTATIN CALCIUM 80 MG/1
80 TABLET, FILM COATED ORAL DAILY
Qty: 30 TABLET | Refills: 0 | Status: SHIPPED
Start: 2025-01-21

## 2025-01-21 RX ORDER — ISOSORBIDE MONONITRATE 30 MG/1
60 TABLET, EXTENDED RELEASE ORAL DAILY
Status: DISCONTINUED | OUTPATIENT
Start: 2025-01-21 | End: 2025-01-21 | Stop reason: HOSPADM

## 2025-01-21 RX ADMIN — CLOPIDOGREL BISULFATE 75 MG: 75 TABLET ORAL at 08:03

## 2025-01-21 RX ADMIN — LOSARTAN POTASSIUM 100 MG: 100 TABLET, FILM COATED ORAL at 08:03

## 2025-01-21 RX ADMIN — DILTIAZEM HYDROCHLORIDE 240 MG: 240 CAPSULE, EXTENDED RELEASE ORAL at 08:03

## 2025-01-21 RX ADMIN — METOPROLOL SUCCINATE 25 MG: 25 TABLET, FILM COATED, EXTENDED RELEASE ORAL at 08:03

## 2025-01-21 RX ADMIN — FUROSEMIDE 40 MG: 40 TABLET ORAL at 08:03

## 2025-01-21 RX ADMIN — ASPIRIN 81 MG: 81 TABLET, COATED ORAL at 08:03

## 2025-01-21 RX ADMIN — SPIRONOLACTONE 25 MG: 25 TABLET ORAL at 08:03

## 2025-01-21 RX ADMIN — ATORVASTATIN CALCIUM 80 MG: 40 TABLET, FILM COATED ORAL at 08:03

## 2025-01-21 RX ADMIN — SODIUM CHLORIDE, PRESERVATIVE FREE 10 ML: 5 INJECTION INTRAVENOUS at 08:12

## 2025-01-21 RX ADMIN — ISOSORBIDE MONONITRATE 60 MG: 30 TABLET, EXTENDED RELEASE ORAL at 12:26

## 2025-01-21 NOTE — OP NOTE
[facility]              [address] [id number]                            OPERATIVE REPORT      PATIENT NAME: YANY DAS JR           : 1950  MED REC NO: [id number]                       ROOM: 2221  ACCOUNT NO: [id number]                       ADMIT DATE: 2025  PROVIDER: Philippe Russell MD    DATE OF SERVICE:  2025    PREOPERATIVE DIAGNOSES:  High-grade asymptomatic left carotid stenosis.    POSTOPERATIVE DIAGNOSES:  High-grade asymptomatic left carotid stenosis.    PROCEDURES PERFORMED:  Left transcarotid arterial revascularization (TCAR).    SURGEON:  Philippe Russell MD    ASSISTANT:  ***    ANESTHESIA:  General.    ESTIMATED BLOOD LOSS:  Minimal.    SPECIMENS REMOVED:  ***    INTRAOPERATIVE FINDINGS:  ***     COMPLICATIONS:  None.    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  He is admitted today for elective TCAR.    DESCRIPTION OF PROCEDURE:  After informed consent, the patient was supine on the operating table.  After adequate induction of general anesthesia, signing patient's confirmation, administration of prophylactic antibiotics, the left neck and right groin were prepped and draped in usual sterile fashion.  Attention was turned to the right groin first where real-time ultrasound was used to identify percutaneously access the right common femoral vein and placing an 8-Cameroonian venous sheath, which was sewn into position, aspirated and flushed.  Attention was then turned to the base of the right neck.  A transverse incision was made just above the clavicle and the heads of the sternocleidomastoid was identified.  Dissection was carried deep to this and the common carotid artery easily identified, dissected free and controlled.  The

## 2025-01-21 NOTE — PLAN OF CARE
Predictive Model Details          28 (Normal)  Factor Value    Calculated 1/21/2025 06:55 43% Age 75 years old    Deterioration Index Model 42% Pulse oximetry 79 %     10% Systolic 154     5% Respiratory rate 15     0% Temperature 98.2 °F (36.8 °C)     0% Pulse 71        Problem: Chronic Conditions and Co-morbidities  Goal: Patient's chronic conditions and co-morbidity symptoms are monitored and maintained or improved  Outcome: Progressing     Problem: Pain  Goal: Verbalizes/displays adequate comfort level or baseline comfort level  Outcome: Progressing     Problem: Safety - Adult  Goal: Free from fall injury  Outcome: Progressing     Problem: Discharge Planning  Goal: Discharge to home or other facility with appropriate resources  Outcome: Progressing

## 2025-01-21 NOTE — PLAN OF CARE
Problem: Chronic Conditions and Co-morbidities  Goal: Patient's chronic conditions and co-morbidity symptoms are monitored and maintained or improved  1/21/2025 1437 by Allyssa Sutherland RN  Outcome: Adequate for Discharge  1/21/2025 0655 by Fiona Trevizo RN  Outcome: Progressing     Problem: Pain  Goal: Verbalizes/displays adequate comfort level or baseline comfort level  1/21/2025 1437 by Allyssa Sutherland RN  Outcome: Adequate for Discharge  1/21/2025 0655 by Fiona Trevizo RN  Outcome: Progressing     Problem: Safety - Adult  Goal: Free from fall injury  1/21/2025 1437 by Allyssa Sutherland RN  Outcome: Adequate for Discharge  1/21/2025 0655 by Fiona Trevizo RN  Outcome: Progressing     Problem: Discharge Planning  Goal: Discharge to home or other facility with appropriate resources  1/21/2025 1437 by Allyssa Sutherland RN  Outcome: Adequate for Discharge  1/21/2025 0655 by Fiona Trevizo RN  Outcome: Progressing

## 2025-01-21 NOTE — PROGRESS NOTES
Vascular Surgery Progress Note  Pamela John ACNP-BC      Date:2025       Room:[address] [place] [address]  Patient Name:Young Marie Jr     YOB: 1950     Age:75 y.o.    Subjective      Mr. Young Marie Jr. is a 75-year-old male with a past medical history significant for hypertrophic cardiomyopathy, hypertension, hyperlipidemia, pulmonary hypertension, stroke, chronic renal disease, and remote history of throat cancer.  He has a history of atrial fibrillation status post ablation and left atrial appendage clipping.  He has remote history of chewing tobacco.  He was taking aspirin, Plavix, and a statin prior to presenting.    He is admitted to the hospital following a left transcarotid artery revascularization on 2025.  He has a known moderate right carotid stenosis (50%).    This a.m. he is hypertensive.  He denies hemispheric or vertebral symptoms.    Objective           Vitals Last 24 Hours:  TEMPERATURE:  Temp  Av.8 °F (36.6 °C)  Min: 97.3 °F (36.3 °C)  Max: 98.2 °F (36.8 °C)  RESPIRATIONS RANGE: Resp  Av.3  Min: 11  Max: 38  PULSE OXIMETRY RANGE: SpO2  Av.3 %  Min: 79 %  Max: 99 %  PULSE RANGE: Pulse  Av.2  Min: 48  Max: 90  BLOOD PRESSURE RANGE: Systolic (24hrs), Av , Min:98 , Max:190   ; Diastolic (24hrs), Av, Min:48, Max:98    I/O (24Hr):    Intake/Output Summary (Last 24 hours) at 2025 1133  Last data filed at 2025 0803  Gross per 24 hour   Intake 1792.08 ml   Output 425 ml   Net 1367.08 ml     Objective:  General Appearance:  Comfortable (OOB to chair).    Vital signs: (most recent): Blood pressure (!) 181/80, pulse 82, temperature 97.9 °F (36.6 °C), temperature source Oral, resp. rate 13, height 1.727 m (5' 7.99\"), weight 95.1 kg (209 lb 10.5 oz), SpO2 93%.  No fever.  (Hypertensive ).    Output: Producing urine.    HEENT: (Left subclavian incision dry and intact with surgical glue)    Lungs:  Normal effort and normal respiratory rate.    Heart:

## 2025-01-21 NOTE — PROGRESS NOTES
Bedside and Verbal shift change report given to VONDA Spivey (oncoming nurse) by VONDA Fiore (offgoing nurse). Report included the following information Nurse Handoff Report and Intake/Output.      0932: Patients /100's. NpAlvaro notified.   0950: Per NP Alvaro restart Cardene.   1005: Patient /80 held Cardene.   1040: [place] removed.   1230: I have reviewed Discharge Instructions with the patient. The patient verbalized understanding. Discharge medications reviewed with patient along with appropriate educational materials.  Opportunity for questions and clarification was provided. All lines removed without difficulty. Vascular sites are clean, dry, and intact. Patient's belongings gathered and with patient. Patient is ready for discharge.

## 2025-01-21 NOTE — CARE COORDINATION
Pt clear from CM standpoint.    Transition of Care Plan:    RUR: 12% \"low risk\"  Prior Level of Functioning: Independent with ADL's  Disposition: Home with family support   ELVER: 1/21  If SNF or IPR: Date FOC offered: N/A  Follow up appointments: PCP/Specialists as indicated  DME needed: None  Transportation at discharge: Pt family to transport   IM/IMM Medicare/ letter given: 1st IM provided by Patient Access Team on 1/20  Is patient a [place] and connected with [facility]? No  Caregiver Contact: Marco Marie (son), [phone]  Discharge Caregiver contacted prior to discharge? To be contacted    Care Conference needed? Not at this time   Barriers to discharge: None    1226 PM: Chart reviewed. CM acknowledged d/c order. Pt to return home independently. . No CM needs identified at this time.      01/21/25 1230   Services At/After Discharge   Transition of Care Consult (CM Consult) Discharge Planning   Services At/After Discharge None   [place] Resource Information Provided? No   Mode of Transport at Discharge Other (see comment)  (family)   Hospital Transport Time of Discharge 1300   Confirm Follow Up Transport Family   Condition of Participation: Discharge Planning   The Plan for Transition of Care is related to the following treatment goals: return home independently   The Patient and/or Patient Representative was provided with a Choice of Provider? Patient   The Patient and/Or Patient Representative agree with the Discharge Plan? Yes     ANSELMO Barragan  Care Management  [facility]   [phone]

## 2025-01-21 NOTE — PROGRESS NOTES
2036: Cardene decreased by 1 mg to 3mg/hr    2129: Cardene decreased to 2mg/hr     2141: Cardene stopped    Discussed with patient around discharge and driving home in the AM. Patient stated Drew SHINE said to the patient at shift change that he could drive home in the morning unless there was a hospital policy that stated otherwise. Informed patient we will have to look for a hospital policy however usually if they have an arterial line     2250: patient informed of hourly neuro checks throughout the night. Patient stated he would like to not be disturbed until 4am after he goes to bed    2344: Neuro assessed     0205: patients ABP elevated to 173/102. Patient found to be urinating in urinal. Patients ABP elevated after urination    0211: informed Pt he may need to be placed on back on cardene due to BP of 190/98 and ABP of 192/79 and patient stated he did not want to be on that. Attempted to inform patient of why we need BP in specific range and patient stated his frustration with this process. Attempted to assess patients neuro status and he said \"you can come back in 15 minutes when you recheck my blood pressure\"

## 2025-01-21 NOTE — DISCHARGE SUMMARY
Vascular Surgery Discharge Summary     Patient ID:  Young Marie Jr  [id number]  75 y.o.  1950    Admitting Provider: Philippe Russell MD  Discharging Provider: Pamela John [facility]    Admit Date: 1/20/2025    Discharge Date: 1/21/2025    Discharge Diagnoses:    High-grade stenosis of the left carotid artery POA   -Likely radiation arteritis (hx of throat cancer in 2013 status post XRT)  Moderate right carotid stenosis (50%) POA   Postprocedure hypertension uPOA   Hypertensive disorder POA   Hypertrophic cardiomyopathy POA   -EF 75 to 80%  Hyperlipidemia POA  Pulmonary hypertension POA  Atrial fibrillation POA  -Ablation and left atrial appendage clipping.   History of stroke POA  Chronic renal disease POA  -Baseline creatinine 1.47  Gastroesophageal reflux disease POA     Procedures during admission:  Left transcarotid artery revascularization 1/20/2025    Hospital Course:   Mr. Young Marie Jr. is a 75-year-old male with a past medical history significant for hypertrophic cardiomyopathy, hypertension, hyperlipidemia, pulmonary hypertension, stroke, chronic renal disease, and remote history of throat cancer s/p XRT in 2013.  He has a history of atrial fibrillation status post ablation and left atrial appendage clipping.  He has remote chewing tobacco.  He was taking aspirin, Plavix, and a statin prior to presenting.     He is admitted to the hospital following a left transcarotid artery revascularization on January 20, 2025.  He has a known moderate right carotid stenosis (50%).  His postprocedural course was complicated by brief period of hypertension that improved after his home medications were administered.    On day of discharge he denies hemispheric or vertebral symptoms.  His left subclavian incision is dry and intact with surgical glue.    Pertinent Results:   Recent Results (from the past 24 hour(s))   POCT activated clotting time    Collection Time: 01/20/25 11:54 AM   Result Value Ref Range

## 2025-01-21 NOTE — DISCHARGE INSTRUCTIONS
Discharge Instructions for TCAR    Home care  Spend your first few days after surgery relaxing at home. It's OK to do quiet activities such as reading or watching TV.  Take your medicines exactly as instructed. Don’t skip doses.  You may drive when you are no longer taking pain medication.   It's OK to shower. Don't scrub your incision.  Don't do strenuous activity for 7 to 10 days after your surgery.  Don’t lift anything heavier than 10 pounds for 2 to 3 weeks after your surgery.  You may return to work after 2 weeks.    Shave carefully around your incision. You may want to use an electric razor.  Gradually increase your activity. It may take some time for you to return to your normal activities.  Check your incision every day for signs of infection (redness, swelling, drainage, or warmth).  Don’t be alarmed if you have some loss of feeling along your jaw line, the incision line, and earlobe. This is a result of the incision and usually goes away after 6 to 12 months.    When to call 911  A stroke is a medical emergency. Call 911 right away if you have any of these symptoms of stroke:  Weakness, tingling, or loss of feeling on one side of your face or body  Sudden double vision or trouble seeing in one or both eyes  Sudden trouble talking or slurred speech  Sudden, severe headache  F.A.S.T. is an easy way to remember the signs of stroke. When you see these signs, call 911 fast.  F.A.S.T. stands for:  F is for face drooping. One side of the face is drooping or numb. When the person smiles, the smile is uneven.  A is for arm weakness. One arm is weak or numb. When the person lifts both arms at the same time, one arm may drift downward.  S is for speech difficulty. You may notice slurred speech or trouble speaking. The person can't repeat a simple sentence correctly when asked.  T is for time to call 911. If someone shows any of these symptoms, even if they go away, call 911 right away. Make note of the time the

## 2025-01-30 ENCOUNTER — TELEPHONE (OUTPATIENT)
Age: 75
End: 2025-01-30

## 2025-01-30 RX ORDER — ATORVASTATIN CALCIUM 80 MG/1
80 TABLET, FILM COATED ORAL DAILY
Qty: 90 TABLET | Refills: 3 | Status: SHIPPED | OUTPATIENT
Start: 2025-01-30

## 2025-01-30 NOTE — TELEPHONE ENCOUNTER
Caller requests Refill of:    atorvastatin (LIPITOR) 80 MG tablet     Pt called in states would like pcp to take over prescription. RX wasn't sent to pharmacy and can't get in contact with the doctor that prescribed.       Please send to:    [facility] - [place], [place] - [address] [place] [address] - P [phone] - F [phone] [address] [facility] [address]  Phone: [phone] Fax: [phone]         Visit / Appointment History:  Future Appointment at [facility]:  Visit date not found   Last Appointment With PCP:  1/7/2025       Caller confirmed instructions and dosages as correct.    Caller was advised that Meds will be refilled as soon as possible, however there can be a 48-72 business hour turn around on refill requests.

## 2025-03-10 ENCOUNTER — TRANSCRIBE ORDERS (OUTPATIENT)
Facility: HOSPITAL | Age: 75
End: 2025-03-10

## 2025-03-10 DIAGNOSIS — R93.89 ABNORMAL CHEST CT: Primary | ICD-10-CM

## 2025-04-15 ENCOUNTER — HOSPITAL ENCOUNTER (OUTPATIENT)
Facility: HOSPITAL | Age: 75
Discharge: HOME | End: 2025-04-18
Attending: INTERNAL MEDICINE
Payer: MEDICARE

## 2025-04-15 DIAGNOSIS — R93.89 ABNORMAL CHEST CT: ICD-10-CM

## 2025-04-15 PROCEDURE — 71250 CT THORAX DX C-: CPT

## 2025-04-28 ENCOUNTER — HOSPITAL ENCOUNTER (OUTPATIENT)
Facility: HOSPITAL | Age: 75
Setting detail: OUTPATIENT SURGERY
Discharge: HOME | End: 2025-04-28
Attending: INTERNAL MEDICINE | Admitting: INTERNAL MEDICINE
Payer: MEDICARE

## 2025-04-28 VITALS
WEIGHT: 200 LBS | TEMPERATURE: 98.1 F | HEIGHT: 67 IN | OXYGEN SATURATION: 95 % | SYSTOLIC BLOOD PRESSURE: 161 MMHG | HEART RATE: 67 BPM | RESPIRATION RATE: 19 BRPM | BODY MASS INDEX: 31.39 KG/M2 | DIASTOLIC BLOOD PRESSURE: 86 MMHG

## 2025-04-28 DIAGNOSIS — R07.9 CHEST PAIN: ICD-10-CM

## 2025-04-28 LAB — ECHO BSA: 2.07 M2

## 2025-04-28 PROCEDURE — 93451 RIGHT HEART CATH: CPT | Performed by: INTERNAL MEDICINE

## 2025-04-28 PROCEDURE — C1760 CLOSURE DEV, VASC: HCPCS | Performed by: INTERNAL MEDICINE

## 2025-04-28 PROCEDURE — C1894 INTRO/SHEATH, NON-LASER: HCPCS | Performed by: INTERNAL MEDICINE

## 2025-04-28 PROCEDURE — C1725 CATH, TRANSLUMIN NON-LASER: HCPCS | Performed by: INTERNAL MEDICINE

## 2025-04-28 PROCEDURE — 6370000000 HC RX 637 (ALT 250 FOR IP): Performed by: INTERNAL MEDICINE

## 2025-04-28 PROCEDURE — 99153 MOD SED SAME PHYS/QHP EA: CPT | Performed by: INTERNAL MEDICINE

## 2025-04-28 PROCEDURE — 2709999900 HC NON-CHARGEABLE SUPPLY: Performed by: INTERNAL MEDICINE

## 2025-04-28 PROCEDURE — 93662 INTRACARDIAC ECG (ICE): CPT | Performed by: INTERNAL MEDICINE

## 2025-04-28 PROCEDURE — C1753 CATH, INTRAVAS ULTRASOUND: HCPCS | Performed by: INTERNAL MEDICINE

## 2025-04-28 PROCEDURE — 99152 MOD SED SAME PHYS/QHP 5/>YRS: CPT | Performed by: INTERNAL MEDICINE

## 2025-04-28 PROCEDURE — 6360000002 HC RX W HCPCS: Performed by: INTERNAL MEDICINE

## 2025-04-28 RX ORDER — HEPARIN SODIUM 10000 [USP'U]/ML
INJECTION, SOLUTION INTRAVENOUS; SUBCUTANEOUS PRN
Status: DISCONTINUED | OUTPATIENT
Start: 2025-04-28 | End: 2025-04-28 | Stop reason: HOSPADM

## 2025-04-28 RX ORDER — HEPARIN SODIUM 1000 [USP'U]/ML
INJECTION, SOLUTION INTRAVENOUS; SUBCUTANEOUS PRN
Status: DISCONTINUED | OUTPATIENT
Start: 2025-04-28 | End: 2025-04-28 | Stop reason: HOSPADM

## 2025-04-28 RX ORDER — SODIUM CHLORIDE 9 MG/ML
INJECTION, SOLUTION INTRAVENOUS PRN
Status: CANCELLED | OUTPATIENT
Start: 2025-04-28

## 2025-04-28 RX ORDER — SODIUM CHLORIDE 0.9 % (FLUSH) 0.9 %
5-40 SYRINGE (ML) INJECTION PRN
Status: CANCELLED | OUTPATIENT
Start: 2025-04-28

## 2025-04-28 RX ORDER — ACETAMINOPHEN 325 MG/1
650 TABLET ORAL EVERY 4 HOURS PRN
Status: CANCELLED | OUTPATIENT
Start: 2025-04-28

## 2025-04-28 RX ORDER — NITROGLYCERIN 0.4 MG/1
0.8 TABLET SUBLINGUAL EVERY 5 MIN PRN
Status: DISCONTINUED | OUTPATIENT
Start: 2025-04-28 | End: 2025-04-28 | Stop reason: HOSPADM

## 2025-04-28 RX ORDER — SODIUM CHLORIDE 0.9 % (FLUSH) 0.9 %
5-40 SYRINGE (ML) INJECTION EVERY 12 HOURS SCHEDULED
Status: CANCELLED | OUTPATIENT
Start: 2025-04-28

## 2025-04-28 RX ORDER — HYDRALAZINE HYDROCHLORIDE 20 MG/ML
10 INJECTION INTRAMUSCULAR; INTRAVENOUS ONCE
Status: COMPLETED | OUTPATIENT
Start: 2025-04-28 | End: 2025-04-28

## 2025-04-28 RX ADMIN — HYDRALAZINE HYDROCHLORIDE 10 MG: 20 INJECTION INTRAMUSCULAR; INTRAVENOUS at 09:53

## 2025-04-28 RX ADMIN — NITROGLYCERIN 0.8 MG: 0.4 TABLET SUBLINGUAL at 12:14

## 2025-04-28 NOTE — PROGRESS NOTES
Cardiac Cath Lab Recovery Arrival Note:      Young Marie Jr arrived to Cardiac Cath Lab, Recovery Area. Staff introduced to patient. Patient identifiers verified with NAME and DATE OF BIRTH. Procedure verified with patient. Consent forms reviewed and signed by patient or authorized representative and verified. Allergies verified.     Patient and family oriented to department. Patient and family informed of procedure and plan of care.     Questions answered with review. Patient prepped for procedure, per orders from physician, prior to arrival.    Patient on cardiac monitor, non-invasive blood pressure, SPO2 monitor. On RA. Patient is A&Ox 4. Patient reports no pain.     Patient in stretcher, in low position, with side rails up, call bell within reach, patient instructed to call if assistance as needed.    Patient prep in: [facility] Recovery Area, [place] [facility].     Family in: [place].   Prep by: Mahnaz

## 2025-04-28 NOTE — PROGRESS NOTES
Patient has walked the hallway of recovery. No SOB, light headiness or dizziness noted.      I have reviewed discharge instructions with the patient.  The patient verbalized understanding.    Discharge medications reviewed with patient and appropriate educational materials regarding medications and side effects teaching were provided.    Site care instructions reviewed with patient. Pain management teaching completed.    Patient instructed to make follow up appointments per discharge instructions.     Patient belongings packed up and accounted for with patient and family. All patient belongings sent home with patient.    Telemetry monitor and wires removed      Patient signed discharge instructions after reviewing them, and duplicate copy placed in chart.     Pt left with friend

## 2025-04-29 NOTE — CARDIO/PULMONARY
04/28/2025  Patient status post right heart cath    EF 12/24/2024  75-80%    Non smoker    Cardiac rehab not indicated

## 2025-05-07 ENCOUNTER — TRANSCRIBE ORDERS (OUTPATIENT)
Facility: HOSPITAL | Age: 75
End: 2025-05-07

## 2025-05-07 DIAGNOSIS — R93.89 ABNORMAL CHEST CT: Primary | ICD-10-CM

## 2025-05-19 ENCOUNTER — HOSPITAL ENCOUNTER (OUTPATIENT)
Facility: HOSPITAL | Age: 75
Setting detail: OUTPATIENT SURGERY
Discharge: HOME | End: 2025-05-19
Attending: INTERNAL MEDICINE | Admitting: INTERNAL MEDICINE
Payer: MEDICARE

## 2025-05-19 VITALS
TEMPERATURE: 98.2 F | DIASTOLIC BLOOD PRESSURE: 85 MMHG | RESPIRATION RATE: 20 BRPM | WEIGHT: 200 LBS | HEART RATE: 73 BPM | SYSTOLIC BLOOD PRESSURE: 137 MMHG | BODY MASS INDEX: 31.32 KG/M2 | OXYGEN SATURATION: 95 %

## 2025-05-19 DIAGNOSIS — R07.9 CHEST PAIN: ICD-10-CM

## 2025-05-19 DIAGNOSIS — I27.20 PULMONARY HYPERTENSION (HCC): Primary | ICD-10-CM

## 2025-05-19 PROCEDURE — 99152 MOD SED SAME PHYS/QHP 5/>YRS: CPT | Performed by: INTERNAL MEDICINE

## 2025-05-19 PROCEDURE — C1769 GUIDE WIRE: HCPCS | Performed by: INTERNAL MEDICINE

## 2025-05-19 PROCEDURE — C1894 INTRO/SHEATH, NON-LASER: HCPCS | Performed by: INTERNAL MEDICINE

## 2025-05-19 PROCEDURE — C1751 CATH, INF, PER/CENT/MIDLINE: HCPCS | Performed by: INTERNAL MEDICINE

## 2025-05-19 PROCEDURE — 93451 RIGHT HEART CATH: CPT | Performed by: INTERNAL MEDICINE

## 2025-05-19 PROCEDURE — 6360000002 HC RX W HCPCS: Performed by: INTERNAL MEDICINE

## 2025-05-19 PROCEDURE — 76937 US GUIDE VASCULAR ACCESS: CPT | Performed by: INTERNAL MEDICINE

## 2025-05-19 PROCEDURE — 2709999900 HC NON-CHARGEABLE SUPPLY: Performed by: INTERNAL MEDICINE

## 2025-05-19 RX ORDER — FENTANYL CITRATE 50 UG/ML
INJECTION, SOLUTION INTRAMUSCULAR; INTRAVENOUS PRN
Status: DISCONTINUED | OUTPATIENT
Start: 2025-05-19 | End: 2025-05-19 | Stop reason: HOSPADM

## 2025-05-19 RX ORDER — MIDAZOLAM HYDROCHLORIDE 1 MG/ML
INJECTION, SOLUTION INTRAMUSCULAR; INTRAVENOUS PRN
Status: DISCONTINUED | OUTPATIENT
Start: 2025-05-19 | End: 2025-05-19 | Stop reason: HOSPADM

## 2025-05-19 NOTE — PROGRESS NOTES
Patient eating dietary tray at this time; pt advised by recovery RN team that his ride's ETA is 1630. Informed patient that the recovery RN team will disconnect him from the cardiac monitor/BP cuff/pulse ox closer to when his ride will be here; pt verbalizes understanding.

## 2025-05-19 NOTE — PROGRESS NOTES
Dietary called a second time and advised they have a ticket out for patient at this time; patient informed by this RN

## 2025-05-19 NOTE — PROGRESS NOTES
Cardiac Cath Lab Recovery Arrival Note:      Young Marie Jr arrived to Cardiac Cath Lab, Recovery Area. Staff introduced to patient. Patient identifiers verified with NAME and DATE OF BIRTH. Procedure verified with patient. Consent forms reviewed and signed by patient or authorized representative and verified. Allergies verified.     Patient and family oriented to department. Patient and family informed of procedure and plan of care.     Questions answered with review. Patient prepped for procedure, per orders from physician, prior to arrival.    Patient on cardiac monitor, non-invasive blood pressure, SPO2 monitor. On RA. Patient is A&Ox 4. Patient reports NO PAIN.     Patient in stretcher, in low position, with side rails up, call bell within reach, patient instructed to call if assistance as needed.    Patient prep in: [facility] Recovery Area, [place] [address].   Patient family has pager #   Family in: .   Prep by: RUDY

## 2025-05-19 NOTE — PROGRESS NOTES
Patent walked the phan of recovery area. No signs or symptoms of SOB or distress.    I have reviewed discharge instructions with the patient.  The patient verbalized understanding.    Discharge medications reviewed with patient and appropriate educational materials regarding medications and side effects teaching were provided.    Site care instructions reviewed with patient. Pain management teaching completed.    Patient instructed to make follow up appointments per discharge instructions.     Patient belongings packed up and accounted for with patient and family. All patient belongings sent home with patient.    Telemetry monitor and wires removed      Patient signed discharge instructions after reviewing them, and duplicate copy placed in chart.     Pt discharged home with jacinta Lisa

## 2025-06-16 ENCOUNTER — PATIENT MESSAGE (OUTPATIENT)
Age: 75
End: 2025-06-16

## 2025-06-17 NOTE — TELEPHONE ENCOUNTER
I let the patient know, I reccommended he go to  for evaluation. If this happens again in the future, he should seek medical treatment. Patient verbalized understanding of information discussed w/ no further questions at this time.    I called the patient back, I asked the patient if he has been taking Plavix and aspirin, he said yes. I let him know  would like him to go to the ED for evaluation. Patient verbalized understanding of information discussed w/ no further questions at this time.

## 2025-07-18 ENCOUNTER — OFFICE VISIT (OUTPATIENT)
Age: 75
End: 2025-07-18
Payer: MEDICARE

## 2025-07-18 VITALS
DIASTOLIC BLOOD PRESSURE: 66 MMHG | HEIGHT: 67 IN | RESPIRATION RATE: 16 BRPM | BODY MASS INDEX: 34.15 KG/M2 | TEMPERATURE: 98.2 F | HEART RATE: 59 BPM | SYSTOLIC BLOOD PRESSURE: 109 MMHG | WEIGHT: 217.6 LBS | OXYGEN SATURATION: 94 %

## 2025-07-18 DIAGNOSIS — I10 PRIMARY HYPERTENSION: Primary | ICD-10-CM

## 2025-07-18 DIAGNOSIS — I42.2 HYPERTROPHIC CARDIOMYOPATHY (HCC): ICD-10-CM

## 2025-07-18 DIAGNOSIS — E03.2 HYPOTHYROIDISM DUE TO MEDICATION: ICD-10-CM

## 2025-07-18 DIAGNOSIS — R73.9 BORDERLINE HYPERGLYCEMIA: ICD-10-CM

## 2025-07-18 DIAGNOSIS — E78.2 MIXED HYPERLIPIDEMIA: ICD-10-CM

## 2025-07-18 DIAGNOSIS — I50.43 CHF (CONGESTIVE HEART FAILURE), NYHA CLASS I, ACUTE ON CHRONIC, COMBINED (HCC): ICD-10-CM

## 2025-07-18 PROCEDURE — 1036F TOBACCO NON-USER: CPT | Performed by: INTERNAL MEDICINE

## 2025-07-18 PROCEDURE — 1159F MED LIST DOCD IN RCRD: CPT | Performed by: INTERNAL MEDICINE

## 2025-07-18 PROCEDURE — 99214 OFFICE O/P EST MOD 30 MIN: CPT | Performed by: INTERNAL MEDICINE

## 2025-07-18 PROCEDURE — 3074F SYST BP LT 130 MM HG: CPT | Performed by: INTERNAL MEDICINE

## 2025-07-18 PROCEDURE — G8427 DOCREV CUR MEDS BY ELIG CLIN: HCPCS | Performed by: INTERNAL MEDICINE

## 2025-07-18 PROCEDURE — G8417 CALC BMI ABV UP PARAM F/U: HCPCS | Performed by: INTERNAL MEDICINE

## 2025-07-18 PROCEDURE — 3078F DIAST BP <80 MM HG: CPT | Performed by: INTERNAL MEDICINE

## 2025-07-18 PROCEDURE — 1123F ACP DISCUSS/DSCN MKR DOCD: CPT | Performed by: INTERNAL MEDICINE

## 2025-07-18 PROCEDURE — 3017F COLORECTAL CA SCREEN DOC REV: CPT | Performed by: INTERNAL MEDICINE

## 2025-07-18 RX ORDER — TIZANIDINE 2 MG/1
2 TABLET ORAL 3 TIMES DAILY PRN
Qty: 30 TABLET | Refills: 1 | Status: SHIPPED | OUTPATIENT
Start: 2025-07-18

## 2025-07-18 NOTE — PROGRESS NOTES
PROGRESS NOTE  Name: Young Marie Jr   : 1950       ASSESSMENT/ PLAN:     Back pain: Handout on self care. Avoid NSAIDs due to CKD3. Tylenol. Rx for Tizanidine.     Leg pain: Tylenol. Rx for Tizanidine.     Primary hypertension: Blood pressure is fine today.  Continue current medications.  -     CBC with Auto Differential; Future  -     Comprehensive Metabolic Panel; Future  -     Lipid Panel; Future    Mixed hyperlipidemia    Borderline hyperglycemia  -     Hemoglobin A1C; Future    Persistent atrial fibrillation (HCC): Follows with RONEL Beckman.   -     TSH; Future    Venous insufficiency of both lower extremities    Hypertrophic cardiomyopathy (HCC): Per cardiology.     Right shoulder pain: Ongoing; it has improved after corticosteroid injection from Dr. Espino.     Return in about 6 months (around 2026) for Hypertension, Annual Wellness Visit.     I have reviewed the patient's medications and risks/side effects/benefits were discussed. Diagnosis(-es) explained to patient and questions answered. Literature provided where appropriate.                    SUBJECTIVE:   Mr. Young Marie Jr is a 75 y.o. male who is here for follow up of routine medical issues.  Previously he saw Dr. Madhav Holt, who retired.     Chief Complaint   Patient presents with    Leg Pain     X3 weeks     Lower Back Pain     X2 days        He is having pain in his L leg for about 3 weeks. The pain is located in the proximal anterior thigh/groin area.     Yesterday he was bending down to wipe something off the floor, and developed sudden back pain. He took Aleve.     Gets lightheaded when he first gets up.     He underwent hybrid ablation in 2023 with Dr. Burton, surgeon, and Dr. Ware. \"They took me off the furosemide, and about a month after the surgery I went into heart failure and had to go back on it.\"    He is being seen by cardiologist, RONEL Beckman for Atrial fibrillation. He is s/p EP study and

## 2025-07-18 NOTE — PROGRESS NOTES
Have you been to the ER, urgent care clinic since your last visit?  Hospitalized since your last visit?   06/17/25 [facility] right are pain    Have you seen or consulted any other health care providers outside our system since your last visit?   no

## 2025-07-19 LAB
ALBUMIN SERPL-MCNC: 3.9 G/DL (ref 3.5–5)
ALBUMIN/GLOB SERPL: 1.4 (ref 1.1–2.2)
ALP SERPL-CCNC: 66 U/L (ref 45–117)
ALT SERPL-CCNC: 33 U/L (ref 12–78)
ANION GAP SERPL CALC-SCNC: 4 MMOL/L (ref 2–12)
AST SERPL-CCNC: 25 U/L (ref 15–37)
BASOPHILS # BLD: 0.05 K/UL (ref 0–0.1)
BASOPHILS NFR BLD: 0.6 % (ref 0–1)
BILIRUB SERPL-MCNC: 0.8 MG/DL (ref 0.2–1)
BUN SERPL-MCNC: 32 MG/DL (ref 6–20)
BUN/CREAT SERPL: 20 (ref 12–20)
CALCIUM SERPL-MCNC: 9.6 MG/DL (ref 8.5–10.1)
CHLORIDE SERPL-SCNC: 103 MMOL/L (ref 97–108)
CHOLEST SERPL-MCNC: 134 MG/DL
CO2 SERPL-SCNC: 32 MMOL/L (ref 21–32)
CREAT SERPL-MCNC: 1.59 MG/DL (ref 0.7–1.3)
DIFFERENTIAL METHOD BLD: ABNORMAL
EOSINOPHIL # BLD: 0.37 K/UL (ref 0–0.4)
EOSINOPHIL NFR BLD: 4.7 % (ref 0–7)
ERYTHROCYTE [DISTWIDTH] IN BLOOD BY AUTOMATED COUNT: 13.2 % (ref 11.5–14.5)
EST. AVERAGE GLUCOSE BLD GHB EST-MCNC: 105 MG/DL
GLOBULIN SER CALC-MCNC: 2.8 G/DL (ref 2–4)
GLUCOSE SERPL-MCNC: 111 MG/DL (ref 65–100)
HBA1C MFR BLD: 5.3 % (ref 4–5.6)
HCT VFR BLD AUTO: 44.3 % (ref 36.6–50.3)
HDLC SERPL-MCNC: 54 MG/DL
HDLC SERPL: 2.5 (ref 0–5)
HGB BLD-MCNC: 14.2 G/DL (ref 12.1–17)
IMM GRANULOCYTES # BLD AUTO: 0.05 K/UL (ref 0–0.04)
IMM GRANULOCYTES NFR BLD AUTO: 0.6 % (ref 0–0.5)
LDLC SERPL CALC-MCNC: 48 MG/DL (ref 0–100)
LYMPHOCYTES # BLD: 0.88 K/UL (ref 0.8–3.5)
LYMPHOCYTES NFR BLD: 11.2 % (ref 12–49)
MCH RBC QN AUTO: 32.2 PG (ref 26–34)
MCHC RBC AUTO-ENTMCNC: 32.1 G/DL (ref 30–36.5)
MCV RBC AUTO: 100.5 FL (ref 80–99)
MONOCYTES # BLD: 0.83 K/UL (ref 0–1)
MONOCYTES NFR BLD: 10.6 % (ref 5–13)
NEUTS SEG # BLD: 5.67 K/UL (ref 1.8–8)
NEUTS SEG NFR BLD: 72.3 % (ref 32–75)
NRBC # BLD: 0 K/UL (ref 0–0.01)
NRBC BLD-RTO: 0 PER 100 WBC
PLATELET # BLD AUTO: 212 K/UL (ref 150–400)
PMV BLD AUTO: 11.1 FL (ref 8.9–12.9)
POTASSIUM SERPL-SCNC: 4.6 MMOL/L (ref 3.5–5.1)
PROT SERPL-MCNC: 6.7 G/DL (ref 6.4–8.2)
RBC # BLD AUTO: 4.41 M/UL (ref 4.1–5.7)
SODIUM SERPL-SCNC: 139 MMOL/L (ref 136–145)
T4 FREE SERPL-MCNC: 0.5 NG/DL (ref 0.8–1.5)
TRIGL SERPL-MCNC: 160 MG/DL
TSH SERPL DL<=0.05 MIU/L-ACNC: 39.9 UIU/ML (ref 0.36–3.74)
VLDLC SERPL CALC-MCNC: 32 MG/DL
WBC # BLD AUTO: 7.9 K/UL (ref 4.1–11.1)

## 2025-07-21 ENCOUNTER — RESULTS FOLLOW-UP (OUTPATIENT)
Age: 75
End: 2025-07-21

## 2025-07-21 RX ORDER — LEVOTHYROXINE SODIUM 75 UG/1
75 TABLET ORAL DAILY
Qty: 90 TABLET | Refills: 3 | Status: SHIPPED | OUTPATIENT
Start: 2025-07-21

## 2025-07-29 ENCOUNTER — OFFICE VISIT (OUTPATIENT)
Age: 75
End: 2025-07-29
Payer: MEDICARE

## 2025-07-29 VITALS
OXYGEN SATURATION: 96 % | HEIGHT: 67 IN | RESPIRATION RATE: 16 BRPM | TEMPERATURE: 98.3 F | SYSTOLIC BLOOD PRESSURE: 97 MMHG | DIASTOLIC BLOOD PRESSURE: 60 MMHG | HEART RATE: 75 BPM | BODY MASS INDEX: 33.06 KG/M2 | WEIGHT: 210.6 LBS

## 2025-07-29 DIAGNOSIS — I95.9 HYPOTENSION, UNSPECIFIED HYPOTENSION TYPE: ICD-10-CM

## 2025-07-29 DIAGNOSIS — M79.605 LEFT LEG PAIN: Primary | ICD-10-CM

## 2025-07-29 PROCEDURE — 3074F SYST BP LT 130 MM HG: CPT | Performed by: INTERNAL MEDICINE

## 2025-07-29 PROCEDURE — 1159F MED LIST DOCD IN RCRD: CPT | Performed by: INTERNAL MEDICINE

## 2025-07-29 PROCEDURE — 1036F TOBACCO NON-USER: CPT | Performed by: INTERNAL MEDICINE

## 2025-07-29 PROCEDURE — 99214 OFFICE O/P EST MOD 30 MIN: CPT | Performed by: INTERNAL MEDICINE

## 2025-07-29 PROCEDURE — 1123F ACP DISCUSS/DSCN MKR DOCD: CPT | Performed by: INTERNAL MEDICINE

## 2025-07-29 PROCEDURE — 3017F COLORECTAL CA SCREEN DOC REV: CPT | Performed by: INTERNAL MEDICINE

## 2025-07-29 PROCEDURE — 3078F DIAST BP <80 MM HG: CPT | Performed by: INTERNAL MEDICINE

## 2025-07-29 PROCEDURE — G8417 CALC BMI ABV UP PARAM F/U: HCPCS | Performed by: INTERNAL MEDICINE

## 2025-07-29 PROCEDURE — G8427 DOCREV CUR MEDS BY ELIG CLIN: HCPCS | Performed by: INTERNAL MEDICINE

## 2025-07-29 NOTE — PROGRESS NOTES
PROGRESS NOTE  Name: Young Marie Jr   : 1950       ASSESSMENT/ PLAN:     Young \"Raúl\" was seen today for leg pain.    Left leg pain: Not improving as expected.  Continue analgesics.  Also,   -     External Referral To Orthopedic Surgery    Hypotension, unspecified hypotension type: Hold antihypertensives.  I recommended that he also get in touch with his cardiologist.  When the blood pressure gets above 140, then add back the medications one at a time, starting with the losartan.    Return in about 6 months (around 2026) for Hypertension; Also nurse visit for BP check in 2-3 weeks.     I have reviewed the patient's medications and risks/side effects/benefits were discussed. Diagnosis(-es) explained to patient and questions answered. Literature provided where appropriate.                       SUBJECTIVE  Mr. Young Marie Jr presents today acutely for     Chief Complaint   Patient presents with    Leg Pain     Left leg      We noted on  that he had been having pain in his L leg for about 3 weeks. The pain is located in the proximal anterior thigh/groin area. We suggested tylenol, and ordered Tizanidine. This didn't help; \"I may as well have been taking placebos.\" He can't get comfortable at night in bed--\"I have to hit just the right spot.\"     BP has been running low. 81/47 yesterday. \"I was really tire.\" Today he cut his aldactone, losartan, and diltiazem.      Past Medical History:   Diagnosis Date    ABRAM (acute kidney injury) 2023    Atrial fibrillation ([facility])     S/p albation, left atrial appendage surgery; Dr. Ware    Branch retinal artery occlusion of left eye 2024    Carotid artery stenosis     CKD (chronic kidney disease)     Congestive heart failure ([facility]) 2020    hosp at [facility]    CVA (cerebrovascular accident due to intracerebral hemorrhage) ([facility]) 2024    G tube feedings ([facility])     PEG    High cholesterol     Hypertension     Hypertrophic cardiomyopathy

## 2025-08-01 RX ORDER — TIZANIDINE 2 MG/1
TABLET ORAL
Qty: 30 TABLET | Refills: 1 | Status: SHIPPED | OUTPATIENT
Start: 2025-08-01

## 2025-08-12 ENCOUNTER — CLINICAL SUPPORT (OUTPATIENT)
Age: 75
End: 2025-08-12

## 2025-08-12 VITALS — DIASTOLIC BLOOD PRESSURE: 80 MMHG | SYSTOLIC BLOOD PRESSURE: 148 MMHG

## 2025-08-12 DIAGNOSIS — I10 PRIMARY HYPERTENSION: Primary | ICD-10-CM

## (undated) DEVICE — SC 3W HP RA OFF NB - PG: Brand: NAMIC

## (undated) DEVICE — BLADE CLIPPER GEN PURP NS

## (undated) DEVICE — PERCLOSE™ PROSTYLE™ SUTURE-MEDIATED CLOSURE AND REPAIR SYSTEM: Brand: PERCLOSE™ PROSTYLE™

## (undated) DEVICE — PINNACLE INTRODUCER SHEATH: Brand: PINNACLE

## (undated) DEVICE — CABLE CATH L10FT RED PIN CONN 34-34 FOR THERMOCOOL

## (undated) DEVICE — DRAPE,UTILTY,TAPE,15X26, 4EA/PK: Brand: MEDLINE

## (undated) DEVICE — BLANKET WRM W25XL64IN NONWOVEN SFT LTWT PLIABLE HYPR

## (undated) DEVICE — SYRINGE TB 1ML TRNSLUC BRL WHT PLUNG BLK MRK CONVENTIONAL

## (undated) DEVICE — KIT,1200CC CANISTER,3/16"X6' TUBING: Brand: MEDLINE INDUSTRIES, INC.

## (undated) DEVICE — HEART CATH-MRMC: Brand: MEDLINE INDUSTRIES, INC.

## (undated) DEVICE — DRAIN SURG SGL COLL PT TB FOR ATS BG OASIS

## (undated) DEVICE — SUTURE VCRL SZ 0 L18IN ABSRB VLT L40MM CT 1/2 CIR J752D

## (undated) DEVICE — SOLUTION IRRIG 1000ML STRL H2O USP PLAS POUR BTL

## (undated) DEVICE — Device

## (undated) DEVICE — KIT ACCS INTRO 4FR L10CM NDL 21GA L7CM GWIRE L40CM

## (undated) DEVICE — SOLUTION IV 500ML 0.9% SOD CHL PH 5 INJ USP VIAFLX PLAS

## (undated) DEVICE — MEDI-TRACE CADENCE ADULT, DEFIBRILLATION ELECTRODE -RTS  (10 PR/PK) - PHYSIO-CONTROL: Brand: MEDI-TRACE CADENCE

## (undated) DEVICE — SUTURE MONOCRYL SZ 4-0 L27IN ABSRB UD L19MM PS-2 1/2 CIR PRIM Y426H

## (undated) DEVICE — KIT,ANTI FOG,W/SPONGE & FLUID,SOFT PACK: Brand: MEDLINE

## (undated) DEVICE — DISSECTOR LAP DIA5MM BLNT TIP ENDOPATH

## (undated) DEVICE — CANISTER, RIGID, 3000CC: Brand: MEDLINE INDUSTRIES, INC.

## (undated) DEVICE — 72" ARTERIAL PRESSURE TUBING: Brand: ICU MEDICAL

## (undated) DEVICE — ELECTRODE PT RET AD L9FT HI MOIST COND ADH HYDRGEL CORDED

## (undated) DEVICE — HYPODERMIC SAFETY NEEDLE: Brand: MAGELLAN

## (undated) DEVICE — INTRODUCER SHTH 0.018 IN 21 GAX7 CM TRANSCAROTID ACCS KT

## (undated) DEVICE — RUNTHROUGH NS EXTRA FLOPPY PTCA GUIDEWIRE: Brand: RUNTHROUGH

## (undated) DEVICE — DRESSING HEMSTAT W12XL12IN 3 PLY QUIKCLOT CONTROL+

## (undated) DEVICE — MAJOR LAPAROTOMY-MRMC: Brand: MEDLINE INDUSTRIES, INC.

## (undated) DEVICE — TUBING PRSS MON L6IN PVC M FEM CONN

## (undated) DEVICE — DRAPE,ANGIO,BRACH,STERILE,38X44: Brand: MEDLINE

## (undated) DEVICE — SEALER/DIVIDER LAP SHFT L37CM JAW APER 11.4MM 315DEG ROT

## (undated) DEVICE — DRESSING FOAM W8.7XL9.1IN SAFETAC LAYR SELF ADH MEPILEX

## (undated) DEVICE — GLOVE SURG SZ 7 CRM LTX FREE POLYISOPRENE POLYMER BEAD ANTI

## (undated) DEVICE — CATHETER PRESSURE WEDGE BLLN 6FRX110CM

## (undated) DEVICE — PROVE COVER: Brand: UNBRANDED

## (undated) DEVICE — DRAIN,WOUND,ROUND,24FR,5/16",FULL-FLUTED: Brand: MEDLINE

## (undated) DEVICE — PACK,LAPAROTOMY,2 REINFORCED GOWNS: Brand: MEDLINE

## (undated) DEVICE — LIQUIBAND RAPID ADHESIVE 36/CS 0.8ML: Brand: MEDLINE

## (undated) DEVICE — GUIDEWIRE VASC L40CM DIA0018IN NDL 21GA L7CM Z S STL MAK

## (undated) DEVICE — Device: Brand: VERISIGHT PRO INTRACARDIAC ECHOCARIOGRAPHY CATHETER

## (undated) DEVICE — SPONGE GZ W4XL4IN COT 12 PLY TYP VII WVN C FLD DSGN STERILE

## (undated) DEVICE — DRAPE,FEM ANGIO,2 WIN,STERILE: Brand: MEDLINE

## (undated) DEVICE — SUTURE SZ 0 27IN 5/8 CIR UR-6  TAPER PT VIOLET ABSRB VICRYL J603H

## (undated) DEVICE — SYRINGE ANGIO CNTRST DEL 20 CC POLYCARB LIGHT GRN MEDALLION

## (undated) DEVICE — GOWN,SIRUS,NONRNF,SETINSLV,XL,20/CS: Brand: MEDLINE

## (undated) DEVICE — AV FISTULA - MRMC: Brand: MEDLINE INDUSTRIES, INC.

## (undated) DEVICE — DEVICE ES L3M EDGE COAT HEX LOK BLDE ELECTRD HOLSTER FORC

## (undated) DEVICE — CATHETER ABLAT 8FR L115CM 1-6-2MM SPC TIP 3.5MM FJ CRV

## (undated) DEVICE — SUTURE PROL SZ 5-0 L24IN NONABSORBABLE BLU RB-2 L13IN 1/2 8554H

## (undated) DEVICE — 3M™ TEGADERM™ TRANSPARENT FILM DRESSING FRAME STYLE, 1626W, 4 IN X 4-3/4 IN (10 CM X 12 CM), 50/CT 4CT/CASE: Brand: 3M™ TEGADERM™

## (undated) DEVICE — SHEATH GUID 11.5X8.5FR L71MM M CRV L22MM BIDIR STEER CARTO

## (undated) DEVICE — CATHETER ELECTROPHYSIOLOGY LG 2-8-2 MM 7 FRX95 CM LIVEWIRE

## (undated) DEVICE — PATCH REF EXT FOR CARTO 3 SYS (EA = 6 PACKS)

## (undated) DEVICE — SUTURE PERMA-HAND SZ 2-0 L30IN NONABSORBABLE BLK L26MM SH K833H

## (undated) DEVICE — RADIFOCUS GLIDEWIRE: Brand: GLIDEWIRE

## (undated) DEVICE — SET GRAVITY SET 20DP 1 SS DEHP-FREE 47177E

## (undated) DEVICE — GLOVE SURG SZ 8 CRM LTX FREE POLYISOPRENE POLYMER BEAD ANTI

## (undated) DEVICE — CATHETER MAP 7FR L115CM 2-6-2 SPC D CRV 22 ELECTRD PENTARAY

## (undated) DEVICE — SUTURE VICRYL + SZ 3-0 L27IN ABSRB UD L26MM SH 1/2 CIR VCP416H

## (undated) DEVICE — 1LYRTR 16FR10ML100%SILTMPS SNP: Brand: MEDLINE INDUSTRIES, INC.

## (undated) DEVICE — 3M™ IOBAN™ 2 ANTIMICROBIAL INCISE DRAPE 6648EZ: Brand: IOBAN™ 2

## (undated) DEVICE — 1 X VERSACROSS TRANSSEPTAL SHEATH (INCLUDING  1 X J-TIP GUIDEWIRE); 1 X VERSACROSS RF WIRE (INCLUDING 1 X CONNECTOR CABLE (SINGLE USE)); 1 X DISPERSIVE ELECTRODE: Brand: VERSACROSS ACCESS SOLUTION

## (undated) DEVICE — CABLE CATH L2.7M CONNECTS TO CARTO 3 SYS PIU FOR LASSO ECO

## (undated) DEVICE — 3M™ MEDIPORE™ H SOFT CLOTH SURGICAL TAPE 2864, 4 INCH X 10 YARD (10CM X 9,14M), 12 ROLLS/CASE: Brand: 3M™ MEDIPORE™

## (undated) DEVICE — SET ADMIN L104IN 18ML GRAV CK VLV RLER CLMP 2 SMRTSITE NDL

## (undated) DEVICE — PRESSURE MONITORING SET: Brand: TRUWAVE

## (undated) DEVICE — MICROPUNCTURE INTRODUCER SET SILHOUETTE TRANSITIONLESS WITH STAINLESS STEEL WIRE GUIDE: Brand: MICROPUNCTURE

## (undated) DEVICE — GUIDEWIRE VASCULAR L95CM DIA0.014IN ENROUTE

## (undated) DEVICE — GOWN,SIRUS,NONRNF,SETINSLV,2XL,18/CS: Brand: MEDLINE

## (undated) DEVICE — Device: Brand: S-CATH INTERCONNECT CABLE

## (undated) DEVICE — SUTURE PROL SZ 6-0 L24IN NONABSORBABLE BLU L9.3MM BV-1 3/8 8805H

## (undated) DEVICE — AGENT HEMSTAT W4XL4IN OXIDIZED REGENERATED CELOS ABSRB SFT

## (undated) DEVICE — ANGLED-TIP ARTERIAL SHEATH CONFIGURATION: Brand: ENROUTE TRANSCAROTID NEUROPROTECTION SYSTEM

## (undated) DEVICE — GUIDEWIRE ENDOSCP L150CM DIA0.035IN TIP L15CM BENT PTFE

## (undated) DEVICE — CABLE EP L150CM RED HEXAPOLAR OCTAPOLAR DECAPOLAR EXTN CONN

## (undated) DEVICE — SUTURE PERMA-HAND SZ 0 L30IN NONABSORBABLE BLK L36MM CT-1 424H

## (undated) DEVICE — CABLE CATH L10FT YEL CONN 12-12 PIN ELECTROGRAM CONDUCTION

## (undated) DEVICE — TUBING PMP FOR CARTO SYS SMARTABLATE

## (undated) DEVICE — TROCAR: Brand: KII FIOS FIRST ENTRY

## (undated) DEVICE — STERILE COTTON BALLS LARGE 5/P: Brand: MEDLINE

## (undated) DEVICE — LAPAROSCOPIC TROCAR SLEEVE/SINGLE USE: Brand: KII® OPTICAL ACCESS SYSTEM

## (undated) DEVICE — STERNUM BLADE, OFFSET (32.0 X 0.8 X 6.3MM)

## (undated) DEVICE — TROCAR: Brand: KII® SLEEVE

## (undated) DEVICE — DRAIN SURG 19FR 0.25IN SIL RND W/ TRCR INDIC DOT RADPQ FULL

## (undated) DEVICE — SOLUTION IRRIG 1000ML 0.9% SOD CHL CONT

## (undated) DEVICE — DEVICE COAG 3CM GUID 6130 FOR EPICARD ABLAT EPI-SENSE

## (undated) DEVICE — STERILE (15.2 TAPERED TO 7.6 X 183CM) POLYETHYLENE ACCORDION-FOLDED COVER FOR USE WITH SIEMENS ACUNAV ULTRASOUND CATHETER FAMILY CONNECTOR: Brand: SWIFTLINK TRANSDUCER COVER

## (undated) DEVICE — SUTURE MCRYL SZ 3-0 L27IN ABSRB UD L24MM PS-1 3/8 CIR PRIM Y936H

## (undated) DEVICE — DRESSING HEMOSTATIC INTVENT W/O SLT QUIKCLOT

## (undated) DEVICE — CABLE EP L150CM BLK HEXAPOLAR OCTAPOLAR DECAPOLAR EXTN CONN

## (undated) DEVICE — INFLATION DEVICE: Brand: ENCORE™ 26

## (undated) DEVICE — GLIDESHEATH SLENDER ACCESS KIT: Brand: GLIDESHEATH SLENDER

## (undated) DEVICE — SOLUTION ANTIFOG VIS SYS CLEARIFY LAPSCP

## (undated) DEVICE — SYRINGE MED 10ML LUERLOCK TIP W/O SFTY DISP

## (undated) DEVICE — SOLUTION IV 1000ML 0.9% SOD CHL PH 5 INJ USP VIAFLX PLAS

## (undated) DEVICE — SWAN-GANZ TRUE SIZE THERMODULTION CATHETER, 5F: Brand: SWAN-GANZ TRUE SIZE

## (undated) DEVICE — 5MM X 25MM: Brand: ENROUTE ENFLATE TRANSCAROTID RX BALLOON DILATATION CATHETER

## (undated) DEVICE — DRAPE,LAPAROTOMY,PED,STERILE: Brand: MEDLINE